# Patient Record
Sex: FEMALE | Race: WHITE | NOT HISPANIC OR LATINO | Employment: OTHER | URBAN - METROPOLITAN AREA
[De-identification: names, ages, dates, MRNs, and addresses within clinical notes are randomized per-mention and may not be internally consistent; named-entity substitution may affect disease eponyms.]

---

## 2017-06-01 ENCOUNTER — ONCOLOGY VISIT (OUTPATIENT)
Dept: ONCOLOGY | Facility: CLINIC | Age: 32
End: 2017-06-01
Attending: CLINICAL NURSE SPECIALIST
Payer: COMMERCIAL

## 2017-06-01 VITALS
RESPIRATION RATE: 16 BRPM | HEART RATE: 80 BPM | DIASTOLIC BLOOD PRESSURE: 63 MMHG | BODY MASS INDEX: 27.76 KG/M2 | WEIGHT: 172 LBS | OXYGEN SATURATION: 98 % | SYSTOLIC BLOOD PRESSURE: 95 MMHG | TEMPERATURE: 98.4 F

## 2017-06-01 DIAGNOSIS — Z15.01 BRCA2 POSITIVE: Primary | ICD-10-CM

## 2017-06-01 DIAGNOSIS — Z91.89 AT RISK FOR CANCER: ICD-10-CM

## 2017-06-01 DIAGNOSIS — Z15.09 BRCA2 POSITIVE: Primary | ICD-10-CM

## 2017-06-01 LAB — CANCER AG125 SERPL-ACNC: 23 U/ML (ref 0–30)

## 2017-06-01 PROCEDURE — 86304 IMMUNOASSAY TUMOR CA 125: CPT | Performed by: CLINICAL NURSE SPECIALIST

## 2017-06-01 PROCEDURE — 36415 COLL VENOUS BLD VENIPUNCTURE: CPT

## 2017-06-01 PROCEDURE — 99211 OFF/OP EST MAY X REQ PHY/QHP: CPT

## 2017-06-01 PROCEDURE — 99214 OFFICE O/P EST MOD 30 MIN: CPT | Performed by: CLINICAL NURSE SPECIALIST

## 2017-06-01 ASSESSMENT — PAIN SCALES - GENERAL: PAINLEVEL: NO PAIN (0)

## 2017-06-01 NOTE — PATIENT INSTRUCTIONS
Individualized Surveillance Plan for women  Hereditary Breast and/or Ovarian Cancer Syndrome   Per NCCN Guidelines Version 2.2017   Recommended screening Test or procedure Last done Next Scheduled    Breast self awareness- starting at age 18. Women should be familiar with their breasts and promptly report changes to their care provider. Periodic, consistent self exam may facilitate breast self awareness.    6/1/2017   ongoing   Breast screening, starting at age 25 Clinical breast exams every 6 -12 months     Breast screening   Age 25-29 Annual breast MRI screening with contrast (preferred) or mammogram if MRI is unavailable or individualized based on family history if a breast cancer diagnosis before age 30 is present.       Breast MRI is performed preferably on day 7-15 of menstrual cycle for premenopausal women.   NA   NA   Breast screening   Age >30-75 years     Annual mammogram and   annual breast MRI, preferably on day 7-15 of menstrual cycle for premenopausal women.    Age>75 years, management should be considered on an individual basis. NA - Prophylactic mastectomy 2016 NA   Ovarian cancer screening, starting at age 30-35 Consider transvaginal ultrasound and  tests. 11/16/2016 - Pelvic US, normal    6/21/2016 - : 23, normal NEXT: ordered, then next in Dec. 2017   Recommendation: risk-reducing salpingo-oophorectomy, typically between 35 and 40 years old and  upon completion of child bearing.     Because ovarian cancer onset in patients with BRCA2 mutations is on average of 8-10 years later than in patients with BRCA1 mutations, it is reasonable to delay risk-reducing salpingo-oophorectomy until 40-45 years in patients with BRCA2 mutations who have already maximized their breast cancer prevention (i.e., undergone bilateral mastectomy).    Salpingectomy alone is not the standard of care for risk reduction although clinical trials are ongoing.     Discuss option of risk-reducing mastectomy.    Consider  risks and benefits of risk reducing agents, such as tamoxifen and raloxifene for breast and ovarian cancer.    Consider a full body skin and eye exam for melanoma screening for both BRCA1+ and BRCA2+.     NOTE: Women with BRCA mutation who are treated for breast cancer should have screening of the remaining breast tissue with annual mammography and breast MRI.    The International Cancer of the Pancreas Screening (CAPS) Consortium recommends that individuals with a BRCA2 mutation who have at least one first-degree relative with pancreatic cancer should undergo initial screening with endoscopic ultrasonography and/or MRI/magnetic resonance cholangiopancreatography.     Please get her an appointment with [facility], [address] [place] [address] Ave.     For Radiology Scheduling call [phone]    Patient wants to schedule her own Ultrasound after she checks her schedule/Ericka  AVS printed & given to patient/Erikca

## 2017-06-01 NOTE — PROGRESS NOTES
"Oncology Risk Management Consultation:  Date on this visit: 2017    Tessa Ospina returns to the clinic today for a follow up oncology risk management consultation. She requires screening and surveillance to minimize her risk of cancer secondary to having a deleterious BRCA2 mutation.  She is considered to be at high risk for hereditary  ovarian cancer.She had a prophylactic mastectomy last year. She is here with her son, Chet, and her daughter, Nely.    Primary Physician: No Ref-Primary, Physician     History Of Present Illness:  Ms. Ospina is a very pleasant, healthy  31 year old female who presents with family history of breast cancer.    Genetic testin2015 - POSITIVE for a and a BRCA2 mutation specifically [id number], the same genetic mutation in her mother. Testing done using single site analysis through [facility].    Pertinent  history:  Menarche at age 13.  First child at age 26.    History of breastfeeding both children. No issues with mastitis.   Ovaries and uterus intact.  No history of breast hyperplasia, dysplasia or malignancy.   2016 - Prophylactic nipple sparing mastectomy with reconstruction (Dr. Nely Mcqueen and Dr. Lam Arora), pathology benign.       Screening history:  Pelvic screenin/3/15 - probable uterine fibroid in anterior myometrium and L ovarian collapsing follicle.   16:L complex ovarian lesion.  3/4/16: small L ovarian cyst.   2016 - pelvic US, normal. No lesions seen.     At this visit, she states she is having some twinges and \"itchyness\" in her breasts, particularly in the upper chest area and notes that she feels some pain and discomfort when lifting.  She is being followed by Dr. Arora and is planning to have an additional fill made to her breasts soon. She states that she is managing the discomfort and recognizes that this is part of her post surgical course.  She denies lumps in her axillae.  She denies abdominal pain, bloating, tenderness, " diarrhea, constipation, urinary frequency, urgency and fatigue.    Past Medical/Surgical History:  Past Medical History:   Diagnosis Date     BRCA2 positive      PONV (postoperative nausea and vomiting)     Unsure if this was due to anesthesia and/or vicodin.     Past Surgical History:   Procedure Laterality Date     BREAST BIOPSY, RT/LT Right 2015    benign      SECTION  ,          MASTECTOMY, NIPPLE SPARING Bilateral 6/15/2016    Procedure: MASTECTOMY, NIPPLE SPARING;  Surgeon: Nely Mcqueen MD;  Location: [facility] OR     RECONSTRUCT BREAST BILATERAL Bilateral 6/15/2016    Procedure: RECONSTRUCT BREAST BILATERAL;  Surgeon: SUZY Arora MD;  Location: [facility] OR     RECONSTRUCT BREAST SECOND STAGE BILATERAL N/A 9/15/2016    Procedure: RECONSTRUCT BREAST SECOND STAGE BILATERAL;  Surgeon: SUZY Arora MD;  Location: [facility] OR     TONSILLECTOMY & ADENOIDECTOMY       WISDOM ST GUIDEWIRE         Allergies:  Allergies as of 2017 - Ambrosio as Reviewed 2017   Allergen Reaction Noted     Percocet [oxycodone-acetaminophen] Nausea and Vomiting 2015     Vicodin [hydrocodone-acetaminophen] Nausea and Vomiting 2010       Current Medications:  No current outpatient prescriptions on file.        Family History:  Family History   Problem Relation Age of Onset     Breast Cancer Mother 49     BRCA2 genetic mutation     Other Cancer Maternal Grandfather 60     prostate (later developed throat cancer)     Genetic Disorder Maternal Aunt      BRCA2 genetic mutation     CANCER Maternal Uncle 70     3 uncles (all 70s and 80s)     Breast Cancer Other 30     maternal cousin; BRCA2 genetic mutation     Other Cancer Other 60     maternal cousin; prostate cancer     Breast Cancer Maternal Aunt 70     Breast Cancer Other 40     maternal cousin     Cancer - colorectal Other 70     Maternal great grandfather     Cancer - colorectal Paternal Uncle 40      in 40s       Social  History:  Social History     Social History     Marital status:      Spouse name: Danny     Number of children: 2     Years of education: N/A     Occupational History      Self     Social History Main Topics     Smoking status: Never Smoker     Smokeless tobacco: Never Used     Alcohol use No     Drug use: No     Sexual activity: Yes     Partners: Male      Comment: no birth control at this time     Other Topics Concern     Caffeine Concern No     Occupational Exposure No     Hobby Hazards No     Sleep Concern No     Stress Concern Yes     Weight Concern Yes     in the process of getting back to prepregnancy weight     Special Diet No     Exercise No     keeping busy with 2 children under the age of 4; exercising using high intensity interval classes     Self-Exams Yes     Social History Narrative       Physical Exam:  BP 95/63 (BP Location: Left arm, Patient Position: Chair, Cuff Size: Adult Regular)  Pulse 80  Temp 98.4  F (36.9  C) (Oral)  Resp 16  Wt 78 kg (172 lb)  SpO2 98%  BMI 27.76 kg/m2    GENERAL APPEARANCE: healthy, alert and no distress     BREAST: well healed, flat light pink vertical scars on lateral breasts bilaterally       SKIN: no suspicious lesions or rashes on upper extremities, head and face    Laboratory/Imaging Studies  Results for orders placed or performed during the hospital encounter of 11/16/16   US Pelvic Complete with Transvaginal    Narrative    ULTRASOUND PELVIS WITH TRANSVAGINAL IMAGING  11/16/2016 3:31 PM     HISTORY: Check resolution of complex ovarian cyst; BRCA2+. Malignant  neoplasm of unspecified site of unspecified female breast. Genetic  susceptibility to malignant neoplasm of breast. Malignant neoplasm of  unspecified ovary.    COMPARISON: 3/4/2016    FINDINGS:  Transvaginal images were performed to better evaluate the  patient's uterus, ovaries and endometrial stripe.    No fibroids are evident. The uterus is normal. Endometrial stripe  measures 14 mm and  is normal for patient's age and menstrual status.  The right ovary is normal. The left ovary is normal.  No adnexal  masses are present. No free pelvic fluid is present.      Impression    IMPRESSION: Negative pelvic ultrasound, no complex cyst seen in the  left ovary.    JUJU MENDOZA MD       ASSESSMENT  Tessa is doing well after her mastectomy and feels that she is getting back to a normal routine for her. Her risk for breast cancer has been reduced at least 90% by having the prophylactic mastectomy.  Because she has a BRCA2 mutation she has an approximate 11-18% lifetime risk of ovarian cancer, including primary peritoneal and fallopian tube cancer, compared to the 1-2% lifetime risk within the general population. The mean age diagnosis of ovarian cancer for BRCA1/2 carriers is estimated to be 50.8 years old.  There is also an increased risk of pancreatic cancer and melanoma. I am referring [facility] to [facility], [address] [place] [address] for a head-to-toe exam, as she is at heightened risk for melanoma.    Male BRCA2 mutation carriers have a cumulative breast cancer risk of over 6% by age 70 and prostate cancer risk of approximately 15% by age 65.    We reviewed what she can do to reduce her risk of ovarian cancer and she is interested in a  prophylactic bilateral salpingo-ooperectomy in the future Research involving 10 studies of BRCA1 and BRCA2 mutation carriers showed an 80% reduction the risk for fallopian tube and ovarian cancer following risk reducing salpingo-oophorectomy, with a residual risk of peritoneal carcinoma of 1-2.7%. According to NCCN, she could consider having a salpingo oophorectomy around age 40-45, which may be reasonable as there is no known ovarian cancer in her family.  Of note, however, both her mother and maternal aunt (both BRCA2+)  Had prophylactic salpingo oophorectomies in their 50s.     We also discussed that she could consider taking oral contraceptives (combination  pills) which could stop ovulation. Oral contraceptives use has been associated with a reduction in ovarian cancer risk. Use of oral contraceptives are  associated with a significant reduced risk of ovarian cancer for BRCA1/2 carriers (summary relative risk (SRR)=0.50; 95% confidence interval (CI), 0.33-0.75). Research shows a significant 36% risk reduction for each additional 10 years of OC use (SRR: 0.64; 95% CI, 0.53-0.78; P trend<0.01).  (Source: Mikaela S, Ernesto M, Santiago N, Darron I, Margot B, Jeannette P, Jacob L, Maisonneuve P, Ganoxanai S Eur J Cancer. 2010;46(12):7880.)  We discussed establishing care with an OB-Gyn for PAPs, pelvic exams and a discussion of oral contraceptives with the intent of stopping ovulation.  Tessa is going to check her insurance and follow up on finding a provider for these services.    INDIVIDUALIZED CANCER RISK MANAGEMENT PLAN:  Individualized Surveillance Plan for women  Hereditary Breast and/or Ovarian Cancer Syndrome   Per NCCN Guidelines Version 2.2017   Recommended screening Test or procedure Last done Next Scheduled    Breast self awareness- starting at age 18. Women should be familiar with their breasts and promptly report changes to their care provider. Periodic, consistent self exam may facilitate breast self awareness.    6/1/2017   ongoing   Breast screening, starting at age 25 Clinical breast exams every 6 -12 months     Breast screening   Age 25-29 Annual breast MRI screening with contrast (preferred) or mammogram if MRI is unavailable or individualized based on family history if a breast cancer diagnosis before age 30 is present.       Breast MRI is performed preferably on day 7-15 of menstrual cycle for premenopausal women.   NA   NA   Breast screening   Age >30-75 years     Annual mammogram and   annual breast MRI, preferably on day 7-15 of menstrual cycle for premenopausal women.    Age>75 years, management should be considered on an individual basis. NA -  Prophylactic mastectomy 2016 NA   Ovarian cancer screening, starting at age 30-35 Consider transvaginal ultrasound and  tests. 11/16/2016 - Pelvic US, normal    6/21/2016 - : 23, normal NEXT: ordered, then next in Dec. 2017   Recommendation: risk-reducing salpingo-oophorectomy, typically between 35 and 40 years old and  upon completion of child bearing.     Because ovarian cancer onset in patients with BRCA2 mutations is on average of 8-10 years later than in patients with BRCA1 mutations, it is reasonable to delay risk-reducing salpingo-oophorectomy until 40-45 years in patients with BRCA2 mutations who have already maximized their breast cancer prevention (i.e., undergone bilateral mastectomy).    Salpingectomy alone is not the standard of care for risk reduction although clinical trials are ongoing.     Discuss option of risk-reducing mastectomy.    Consider risks and benefits of risk reducing agents, such as tamoxifen and raloxifene for breast and ovarian cancer.    Consider a full body skin and eye exam for melanoma screening for both BRCA1+ and BRCA2+.     NOTE: Women with BRCA mutation who are treated for breast cancer should have screening of the remaining breast tissue with annual mammography and breast MRI.    The International Cancer of the Pancreas Screening (CAPS) Consortium recommends that individuals with a BRCA2 mutation who have at least one first-degree relative with pancreatic cancer should undergo initial screening with endoscopic ultrasonography and/or MRI/magnetic resonance cholangiopancreatography.           I will follow up on her screenings and see her in the Cancer Risk Management clinic in one year.    I spent 30 minutes with the patient with greater that 50% of it in counseling and coordinating care as documented above.    Cate Castro, MESHA-CNS, OCN, ANG-BC  Clinical Nurse Specialist  Cancer Risk Management Program  [address]  [address]  [place] [address]    phone:  [phone]  Pager: [phone]  fax: [phone]

## 2017-06-01 NOTE — MR AVS SNAPSHOT
After Visit Summary   6/1/2017    Tessa Ospina    MRN: [id number]           Patient Information     Date Of Birth          1985        Visit Information        Provider Department      6/1/2017 9:30 AM Cate Castro APRN [facility]        Today's Diagnoses     BRCA2 positive    -  1    At risk for cancer          Care Instructions    Individualized Surveillance Plan for women  Hereditary Breast and/or Ovarian Cancer Syndrome   Per NCCN Guidelines Version 2.2017   Recommended screening Test or procedure Last done Next Scheduled    Breast self awareness- starting at age 18. Women should be familiar with their breasts and promptly report changes to their care provider. Periodic, consistent self exam may facilitate breast self awareness.    6/1/2017   ongoing   Breast screening, starting at age 25 Clinical breast exams every 6 -12 months     Breast screening   Age 25-29 Annual breast MRI screening with contrast (preferred) or mammogram if MRI is unavailable or individualized based on family history if a breast cancer diagnosis before age 30 is present.       Breast MRI is performed preferably on day 7-15 of menstrual cycle for premenopausal women.   NA   NA   Breast screening   Age >30-75 years     Annual mammogram and   annual breast MRI, preferably on day 7-15 of menstrual cycle for premenopausal women.    Age>75 years, management should be considered on an individual basis. NA - Prophylactic mastectomy 2016 NA   Ovarian cancer screening, starting at age 30-35 Consider transvaginal ultrasound and  tests. 11/16/2016 - Pelvic US, normal    6/21/2016 - : 23, normal NEXT: ordered, then next in Dec. 2017   Recommendation: risk-reducing salpingo-oophorectomy, typically between 35 and 40 years old and  upon completion of child bearing.     Because ovarian cancer onset in patients with BRCA2 mutations is on average of 8-10 years later than in patients with  BRCA1 mutations, it is reasonable to delay risk-reducing salpingo-oophorectomy until 40-45 years in patients with BRCA2 mutations who have already maximized their breast cancer prevention (i.e., undergone bilateral mastectomy).    Salpingectomy alone is not the standard of care for risk reduction although clinical trials are ongoing.     Discuss option of risk-reducing mastectomy.    Consider risks and benefits of risk reducing agents, such as tamoxifen and raloxifene for breast and ovarian cancer.    Consider a full body skin and eye exam for melanoma screening for both BRCA1+ and BRCA2+.     NOTE: Women with BRCA mutation who are treated for breast cancer should have screening of the remaining breast tissue with annual mammography and breast MRI.    The International Cancer of the Pancreas Screening (CAPS) Consortium recommends that individuals with a BRCA2 mutation who have at least one first-degree relative with pancreatic cancer should undergo initial screening with endoscopic ultrasonography and/or MRI/magnetic resonance cholangiopancreatography.     Please get her an appointment with [facility], [address] [place] [address]     For Radiology Scheduling call [phone]            Follow-ups after your visit        Additional Services     DERMATOLOGY REFERRAL       Your provider has referred you to: [facility]: [facility] [phone]   [email or web address]    Please be aware that coverage of these services is subject to the terms and limitations of your health insurance plan.  Call member services at your health plan with any benefit or coverage questions.      Please bring the following to your appointment:  Any x-rays, CTs or MRIs which have been performed.  Contact the facility where they were done to arrange for  prior to your scheduled appointment.  Any new CT, MRI or other procedures ordered by your specialist must be performed at a [facility] or  coordinated by your clinic's referral office.    List of current medications   This referral request   Any documents/labs given to you for this referral                  Follow-up notes from your care team     Return in about 6 months (around 12/1/2017) for Physical Exam.      Future tests that were ordered for you today     Open Standing Orders        Priority Remaining Interval Expires Ordered     Routine 2/2 6 12/1/2017 6/1/2017          Open Future Orders        Priority Expected Expires Ordered    US Pelvic Complete with Transvaginal Routine 12/1/2017 6/1/2018 6/1/2017            Who to contact     If you have questions or need follow up information about today's clinic visit or your schedule please contact [facility]ISK MANAGEMENT PROGRAM directly at [phone].  Normal or non-critical lab and imaging results will be communicated to you by [employer], letter or phone within 4 business days after the clinic has received the results. If you do not hear from us within 7 days, please contact the clinic through [employer] or phone. If you have a critical or abnormal lab result, we will notify you by phone as soon as possible.  Submit refill requests through [employer] or call your pharmacy and they will forward the refill request to us. Please allow 3 business days for your refill to be completed.          Additional Information About Your Visit        [employer] Information     [employer] gives you secure access to your electronic health record. If you see a primary care provider, you can also send messages to your care team and make appointments. If you have questions, please call your primary care clinic.  If you do not have a primary care provider, please call [phone] and they will assist you.        Care EveryWhere ID     This is your Care EveryWhere ID. This could be used by other organizations to access your [facility] medical records  [id number]        Your Vitals Were     Pulse Temperature Respirations Pulse  Oximetry BMI (Body Mass Index)       80 98.4  F (36.9  C) (Oral) 16 98% 27.76 kg/m2        Blood Pressure from Last 3 Encounters:   06/01/17 95/63   09/15/16 94/64   09/12/16 92/68    Weight from Last 3 Encounters:   06/01/17 78 kg (172 lb)   09/15/16 77.3 kg (170 lb 6.7 oz)   09/12/16 77.9 kg (171 lb 12.8 oz)              We Performed the Following          DERMATOLOGY REFERRAL        Primary Care Provider    Physician No Ref-Primary       No address on file        Thank you!     Thank you for choosing [facility]  for your care. Our goal is always to provide you with excellent care. Hearing back from our patients is one way we can continue to improve our services. Please take a few minutes to complete the written survey that you may receive in the mail after your visit with us. Thank you!             Your Updated Medication List - Protect others around you: Learn how to safely use, store and throw away your medicines at www.disposemymeds.org.      Notice  As of 6/1/2017 10:36 AM    You have not been prescribed any medications.

## 2017-06-01 NOTE — PROGRESS NOTES
"Oncology Rooming Note    June 1, 2017 9:41 AM   Tessa Ospina is a 31 year old female who presents for:    Chief Complaint   Patient presents with     Oncology Clinic Visit     Bilteral Mastectomy      Initial Vitals: BP 95/63 (BP Location: Left arm, Patient Position: Chair, Cuff Size: Adult Regular)  Pulse 80  Temp 98.4  F (36.9  C) (Oral)  Resp 16  Wt 78 kg (172 lb)  SpO2 98%  BMI 27.76 kg/m2 Estimated body mass index is 27.76 kg/(m^2) as calculated from the following:    Height as of 9/15/16: 1.676 m (5' 6\").    Weight as of this encounter: 78 kg (172 lb). Body surface area is 1.91 meters squared.  No Pain (0) Comment: Data Unavailable   No LMP recorded.  Allergies reviewed: Yes  Medications reviewed: Yes    Medications: Medication refills not needed today.  Pharmacy name entered into Network Chemistry:    [facility] [address] [facility] - [place], [place] - [address]  [facility] [id number] [place] [address]    Clinical concerns: Follow-Up Dr. Castro was notified.    5 minutes for nursing intake (face to face time)     Shani Oneill MA    Medical Assistant Note:  Tessa Ospina presents today for lab visit.    Patient seen by provider today: Yes: Cate Castro.   present during visit today: Not Applicable.    Concerns: No Concerns.    Procedure:  Lab draw site: LAC, Needle type: BF, Gauge: 21 g gauze and coban applied.    Post Assessment:  Labs drawn without difficulty: Yes.    Discharge Plan:  Departure Mode: Ambulatory.    Face to Face Time: 5.    Christina Retana MA            "

## 2017-06-01 NOTE — LETTER
"    Cancer Risk Management  Program Locations    [facility]  [facility]  [facility]  Mailing Address  Cancer Risk Management Program  [facility]  [address]  [place] [address]    New patient appointments  [phone]  June 1, 2017    Tessa Ospina  [address]    [facility] [address]      Dear Tessa,    It was a pleasure seeing you and the kids today at the clinic. I will let you know what your results show. Please feel free to contact me if you have any questions or concerns. Below is your \"homework\" for follow up! I'll see you next June.      Oncology Rooming Note    June 1, 2017 9:41 AM   Tessa Ospina is a 31 year old female who presents for:    Chief Complaint   Patient presents with     Oncology Clinic Visit     Bilteral Mastectomy      Initial Vitals: BP 95/63 (BP Location: Left arm, Patient Position: Chair, Cuff Size: Adult Regular)  Pulse 80  Temp 98.4  F (36.9  C) (Oral)  Resp 16  Wt 78 kg (172 lb)  SpO2 98%  BMI 27.76 kg/m2 Estimated body mass index is 27.76 kg/(m^2) as calculated from the following:    Height as of 9/15/16: 1.676 m (5' 6\").    Weight as of this encounter: 78 kg (172 lb). Body surface area is 1.91 meters squared.  No Pain (0) Comment: Data Unavailable   No LMP recorded.  Allergies reviewed: Yes  Medications reviewed: Yes    Medications: Medication refills not needed today.  Pharmacy name entered into [facility]:    [facility] [place], [place] - [address] [place] [address] [facility] [place], [place] - [address]  [facility] [id number] [place] [address]    Clinical concerns: Follow-Up Dr. Castro was notified.    5 minutes for nursing intake (face to face time)     Shani Oneill MA    Medical Assistant Note:  Tessa Ospina presents today " "for lab visit.    Patient seen by provider today: Yes: Cate Castro.   present during visit today: Not Applicable.    Concerns: No Concerns.    Procedure:  Lab draw site: LAC, Needle type: BF, Gauge: 21 g gauze and coban applied.    Post Assessment:  Labs drawn without difficulty: Yes.    Discharge Plan:  Departure Mode: Ambulatory.    Face to Face Time: 5.    Christina Retana MA              Oncology Risk Management Consultation:  Date on this visit: 2017    Tessa Ospina returns to the clinic today for a follow up oncology risk management consultation. She requires screening and surveillance to minimize her risk of cancer secondary to having a deleterious BRCA2 mutation.  She is considered to be at high risk for hereditary  ovarian cancer.She had a prophylactic mastectomy last year. She is here with her son, Chet, and her daughter, Nely.    Primary Physician: No Ref-Primary, Physician     History Of Present Illness:  Ms. Ospina is a very pleasant, healthy  31 year old female who presents with family history of breast cancer.    Genetic testin2015 - POSITIVE for a and a BRCA2 mutation specifically [id number], the same genetic mutation in her mother. Testing done using single site analysis through [facility].    Pertinent  history:  Menarche at age 13.  First child at age 26.    History of breastfeeding both children. No issues with mastitis.   Ovaries and uterus intact.  No history of breast hyperplasia, dysplasia or malignancy.   2016 - Prophylactic nipple sparing mastectomy with reconstruction (Dr. Nely Mcqueen and Dr. Lam Arora), pathology benign.       Screening history:  Pelvic screenin/3/15 - probable uterine fibroid in anterior myometrium and L ovarian collapsing follicle.   16:L complex ovarian lesion.  3/4/16: small L ovarian cyst.   2016 - pelvic US, normal. No lesions seen.     At this visit, she states she is having some twinges and \"itchyness\" in her " breasts, particularly in the upper chest area and notes that she feels some pain and discomfort when lifting.  She is being followed by Dr. Arora and is planning to have an additional fill made to her breasts soon. She states that she is managing the discomfort and recognizes that this is part of her post surgical course.  She denies lumps in her axillae.  She denies abdominal pain, bloating, tenderness, diarrhea, constipation, urinary frequency, urgency and fatigue.    Past Medical/Surgical History:  Past Medical History:   Diagnosis Date     BRCA2 positive      PONV (postoperative nausea and vomiting)     Unsure if this was due to anesthesia and/or vicodin.     Past Surgical History:   Procedure Laterality Date     BREAST BIOPSY, RT/LT Right 2015    benign      SECTION  ,          MASTECTOMY, NIPPLE SPARING Bilateral 6/15/2016    Procedure: MASTECTOMY, NIPPLE SPARING;  Surgeon: Nely Mcqueen MD;  Location: [facility]     RECONSTRUCT BREAST BILATERAL Bilateral 6/15/2016    Procedure: RECONSTRUCT BREAST BILATERAL;  Surgeon: SUZY Arora MD;  Location: [facility]     RECONSTRUCT BREAST SECOND STAGE BILATERAL N/A 9/15/2016    Procedure: RECONSTRUCT BREAST SECOND STAGE BILATERAL;  Surgeon: SUZY Arora MD;  Location: [facility] OR     TONSILLECTOMY & ADENOIDECTOMY       [facility]         Allergies:  Allergies as of 2017 - Ambrosio as Reviewed 2017   Allergen Reaction Noted     Percocet [oxycodone-acetaminophen] Nausea and Vomiting 2015     Vicodin [hydrocodone-acetaminophen] Nausea and Vomiting 2010       Current Medications:  No current outpatient prescriptions on file.        Family History:  Family History   Problem Relation Age of Onset     Breast Cancer Mother 49     BRCA2 genetic mutation     Other Cancer Maternal Grandfather 60     prostate (later developed throat cancer)     Genetic Disorder Maternal Aunt      BRCA2 genetic mutation      CANCER Maternal Uncle 70     3 uncles (all 70s and 80s)     Breast Cancer Other 30     maternal cousin; BRCA2 genetic mutation     Other Cancer Other 60     maternal cousin; prostate cancer     Breast Cancer Maternal Aunt 70     Breast Cancer Other 40     maternal cousin     Cancer - colorectal Other 70     Maternal great grandfather     Cancer - colorectal Paternal Uncle 40      in 40s       Social History:  Social History     Social History     Marital status:      Spouse name: Danny     Number of children: 2     Years of education: N/A     Occupational History      Self     Social History Main Topics     Smoking status: Never Smoker     Smokeless tobacco: Never Used     Alcohol use No     Drug use: No     Sexual activity: Yes     Partners: Male      Comment: no birth control at this time     Other Topics Concern     Caffeine Concern No     Occupational Exposure No     Hobby Hazards No     Sleep Concern No     Stress Concern Yes     Weight Concern Yes     in the process of getting back to prepregnancy weight     Special Diet No     Exercise No     keeping busy with 2 children under the age of 4; exercising using high intensity interval classes     Self-Exams Yes     Social History Narrative       Physical Exam:  BP 95/63 (BP Location: Left arm, Patient Position: Chair, Cuff Size: Adult Regular)  Pulse 80  Temp 98.4  F (36.9  C) (Oral)  Resp 16  Wt 78 kg (172 lb)  SpO2 98%  BMI 27.76 kg/m2    GENERAL APPEARANCE: healthy, alert and no distress     BREAST: well healed, flat light pink vertical scars on lateral breasts bilaterally       SKIN: no suspicious lesions or rashes on upper extremities, head and face    Laboratory/Imaging Studies  Results for orders placed or performed during the hospital encounter of 16   US Pelvic Complete with Transvaginal    Narrative    ULTRASOUND PELVIS WITH TRANSVAGINAL IMAGING  2016 3:31 PM     HISTORY: Check resolution of complex ovarian cyst;  BRCA2+. Malignant  neoplasm of unspecified site of unspecified female breast. Genetic  susceptibility to malignant neoplasm of breast. Malignant neoplasm of  unspecified ovary.    COMPARISON: 3/4/2016    FINDINGS:  Transvaginal images were performed to better evaluate the  patient's uterus, ovaries and endometrial stripe.    No fibroids are evident. The uterus is normal. Endometrial stripe  measures 14 mm and is normal for patient's age and menstrual status.  The right ovary is normal. The left ovary is normal.  No adnexal  masses are present. No free pelvic fluid is present.      Impression    IMPRESSION: Negative pelvic ultrasound, no complex cyst seen in the  left ovary.    JUJU EMNDOZA MD       ASSESSMENT  Tessa is doing well after her mastectomy and feels that she is getting back to a normal routine for her. Her risk for breast cancer has been reduced at least 90% by having the prophylactic mastectomy.  Because she has a BRCA2 mutation she has an approximate 11-18% lifetime risk of ovarian cancer, including primary peritoneal and fallopian tube cancer, compared to the 1-2% lifetime risk within the general population. The mean age diagnosis of ovarian cancer for BRCA1/2 carriers is estimated to be 50.8 years old.  There is also an increased risk of pancreatic cancer and melanoma. I am referring her to [facility], [address] [place] [address].  [facility] for a head-to-toe exam, as she is at heightened risk for melanoma.    Male BRCA2 mutation carriers have a cumulative breast cancer risk of over 6% by age 70 and prostate cancer risk of approximately 15% by age 65.    We reviewed what she can do to reduce her risk of ovarian cancer and she is interested in a  prophylactic bilateral salpingo-ooperectomy in the future Research involving 10 studies of BRCA1 and BRCA2 mutation carriers showed an 80% reduction the risk for fallopian tube and ovarian cancer following risk reducing salpingo-oophorectomy, with a residual  risk of peritoneal carcinoma of 1-2.7%. According to NCCN, she could consider having a salpingo oophorectomy around age 40-45, which may be reasonable as there is no known ovarian cancer in her family.  Of note, however, both her mother and maternal aunt (both BRCA2+)  Had prophylactic salpingo oophorectomies in their 50s.     We also discussed that she could consider taking oral contraceptives (combination pills) which could stop ovulation. Oral contraceptives use has been associated with a reduction in ovarian cancer risk. Use of oral contraceptives are  associated with a significant reduced risk of ovarian cancer for BRCA1/2 carriers (summary relative risk (SRR)=0.50; 95% confidence interval (CI), 0.33-0.75). Research shows a significant 36% risk reduction for each additional 10 years of OC use (SRR: 0.64; 95% CI, 0.53-0.78; P trend<0.01).  (Source: Mikaela S, Ernesto M, Santiago N, Darron I, Margot B, Jeannette P, Jacob L, Maisonneuve P, Won S Eur J Cancer. 2010;46(12):2275.)  We discussed establishing care with an OB-Gyn for PAPs, pelvic exams and a discussion of oral contraceptives with the intent of stopping ovulation.  Tessa is going to check her insurance and follow up on finding a provider for these services.    INDIVIDUALIZED CANCER RISK MANAGEMENT PLAN:  Individualized Surveillance Plan for women  Hereditary Breast and/or Ovarian Cancer Syndrome   Per NCCN Guidelines Version 2.2017   Recommended screening Test or procedure Last done Next Scheduled    Breast self awareness- starting at age 18. Women should be familiar with their breasts and promptly report changes to their care provider. Periodic, consistent self exam may facilitate breast self awareness.    6/1/2017   ongoing   Breast screening, starting at age 25 Clinical breast exams every 6 -12 months     Breast screening   Age 25-29 Annual breast MRI screening with contrast (preferred) or mammogram if MRI is unavailable or individualized based on  family history if a breast cancer diagnosis before age 30 is present.       Breast MRI is performed preferably on day 7-15 of menstrual cycle for premenopausal women.   NA   NA   Breast screening   Age >30-75 years     Annual mammogram and   annual breast MRI, preferably on day 7-15 of menstrual cycle for premenopausal women.    Age>75 years, management should be considered on an individual basis. NA - Prophylactic mastectomy 2016 NA   Ovarian cancer screening, starting at age 30-35 Consider transvaginal ultrasound and  tests. 11/16/2016 - Pelvic US, normal    6/21/2016 - : 23, normal NEXT: ordered, then next in Dec. 2017   Recommendation: risk-reducing salpingo-oophorectomy, typically between 35 and 40 years old and  upon completion of child bearing.     Because ovarian cancer onset in patients with BRCA2 mutations is on average of 8-10 years later than in patients with BRCA1 mutations, it is reasonable to delay risk-reducing salpingo-oophorectomy until 40-45 years in patients with BRCA2 mutations who have already maximized their breast cancer prevention (i.e., undergone bilateral mastectomy).    Salpingectomy alone is not the standard of care for risk reduction although clinical trials are ongoing.     Discuss option of risk-reducing mastectomy.    Consider risks and benefits of risk reducing agents, such as tamoxifen and raloxifene for breast and ovarian cancer.    Consider a full body skin and eye exam for melanoma screening for both BRCA1+ and BRCA2+.     NOTE: Women with BRCA mutation who are treated for breast cancer should have screening of the remaining breast tissue with annual mammography and breast MRI.    The International Cancer of the Pancreas Screening (CAPS) Consortium recommends that individuals with a BRCA2 mutation who have at least one first-degree relative with pancreatic cancer should undergo initial screening with endoscopic ultrasonography and/or MRI/magnetic resonance  cholangiopancreatography.           I will follow up on her screenings and see her in the Cancer Risk Management clinic in one year.    I spent 30 minutes with the patient with greater that 50% of it in counseling and coordinating care as documented above.    Cate Castro, MESHA-CNS, OCN, ANG-BC  Clinical Nurse Specialist  Cancer Risk Management Program  [address]  [place] [address]    phone:  [phone]  Pager: [phone]  fax: [phone]

## 2017-06-07 ENCOUNTER — HOSPITAL ENCOUNTER (OUTPATIENT)
Dept: ULTRASOUND IMAGING | Facility: CLINIC | Age: 32
Discharge: HOME | End: 2017-06-07
Attending: CLINICAL NURSE SPECIALIST | Admitting: CLINICAL NURSE SPECIALIST
Payer: COMMERCIAL

## 2017-06-07 DIAGNOSIS — Z15.09 BRCA2 POSITIVE: ICD-10-CM

## 2017-06-07 DIAGNOSIS — Z91.89 AT RISK FOR CANCER: ICD-10-CM

## 2017-06-07 DIAGNOSIS — Z15.01 BRCA2 POSITIVE: ICD-10-CM

## 2017-06-07 PROCEDURE — 76830 TRANSVAGINAL US NON-OB: CPT

## 2017-08-29 ENCOUNTER — OFFICE VISIT (OUTPATIENT)
Dept: FAMILY MEDICINE | Facility: CLINIC | Age: 32
End: 2017-08-29
Payer: COMMERCIAL

## 2017-08-29 VITALS
HEART RATE: 89 BPM | BODY MASS INDEX: 27.8 KG/M2 | WEIGHT: 173 LBS | OXYGEN SATURATION: 98 % | SYSTOLIC BLOOD PRESSURE: 98 MMHG | DIASTOLIC BLOOD PRESSURE: 69 MMHG | TEMPERATURE: 98.2 F | HEIGHT: 66 IN

## 2017-08-29 DIAGNOSIS — R11.0 NAUSEA: ICD-10-CM

## 2017-08-29 DIAGNOSIS — Z86.32 HISTORY OF GESTATIONAL DIABETES: ICD-10-CM

## 2017-08-29 DIAGNOSIS — R73.03 PRE-DIABETES: Primary | ICD-10-CM

## 2017-08-29 DIAGNOSIS — Z83.79 FAMILY HISTORY OF CELIAC DISEASE: ICD-10-CM

## 2017-08-29 LAB
ALBUMIN SERPL-MCNC: 4.2 G/DL (ref 3.4–5)
ALP SERPL-CCNC: 66 U/L (ref 40–150)
ALT SERPL W P-5'-P-CCNC: 20 U/L (ref 0–50)
ANION GAP SERPL CALCULATED.3IONS-SCNC: 10 MMOL/L (ref 3–14)
AST SERPL W P-5'-P-CCNC: 16 U/L (ref 0–45)
BASOPHILS # BLD AUTO: 0 10E9/L (ref 0–0.2)
BASOPHILS NFR BLD AUTO: 0.7 %
BILIRUB SERPL-MCNC: 0.5 MG/DL (ref 0.2–1.3)
BUN SERPL-MCNC: 13 MG/DL (ref 7–30)
CALCIUM SERPL-MCNC: 9.1 MG/DL (ref 8.5–10.1)
CHLORIDE SERPL-SCNC: 105 MMOL/L (ref 94–109)
CHOLEST SERPL-MCNC: 156 MG/DL
CO2 SERPL-SCNC: 23 MMOL/L (ref 20–32)
CREAT SERPL-MCNC: 0.76 MG/DL (ref 0.52–1.04)
DIFFERENTIAL METHOD BLD: NORMAL
EOSINOPHIL # BLD AUTO: 0.1 10E9/L (ref 0–0.7)
EOSINOPHIL NFR BLD AUTO: 1.1 %
ERYTHROCYTE [DISTWIDTH] IN BLOOD BY AUTOMATED COUNT: 12.9 % (ref 10–15)
GFR SERPL CREATININE-BSD FRML MDRD: 88 ML/MIN/1.7M2
GLUCOSE SERPL-MCNC: 98 MG/DL (ref 70–99)
HBA1C MFR BLD: 4.9 % (ref 4.3–6)
HCT VFR BLD AUTO: 42.6 % (ref 35–47)
HDLC SERPL-MCNC: 62 MG/DL
HGB BLD-MCNC: 14.5 G/DL (ref 11.7–15.7)
LDLC SERPL CALC-MCNC: 74 MG/DL
LYMPHOCYTES # BLD AUTO: 1.6 10E9/L (ref 0.8–5.3)
LYMPHOCYTES NFR BLD AUTO: 28.7 %
MCH RBC QN AUTO: 30.5 PG (ref 26.5–33)
MCHC RBC AUTO-ENTMCNC: 34 G/DL (ref 31.5–36.5)
MCV RBC AUTO: 90 FL (ref 78–100)
MONOCYTES # BLD AUTO: 0.3 10E9/L (ref 0–1.3)
MONOCYTES NFR BLD AUTO: 5.6 %
NEUTROPHILS # BLD AUTO: 3.5 10E9/L (ref 1.6–8.3)
NEUTROPHILS NFR BLD AUTO: 63.9 %
NONHDLC SERPL-MCNC: 94 MG/DL
PLATELET # BLD AUTO: 258 10E9/L (ref 150–450)
POTASSIUM SERPL-SCNC: 4 MMOL/L (ref 3.4–5.3)
PROT SERPL-MCNC: 7.6 G/DL (ref 6.8–8.8)
RBC # BLD AUTO: 4.75 10E12/L (ref 3.8–5.2)
SODIUM SERPL-SCNC: 138 MMOL/L (ref 133–144)
TRIGL SERPL-MCNC: 101 MG/DL
TSH SERPL DL<=0.005 MIU/L-ACNC: 2.49 MU/L (ref 0.4–4)
WBC # BLD AUTO: 5.5 10E9/L (ref 4–11)

## 2017-08-29 PROCEDURE — 83036 HEMOGLOBIN GLYCOSYLATED A1C: CPT | Performed by: INTERNAL MEDICINE

## 2017-08-29 PROCEDURE — 83516 IMMUNOASSAY NONANTIBODY: CPT | Mod: 59 | Performed by: INTERNAL MEDICINE

## 2017-08-29 PROCEDURE — 80050 GENERAL HEALTH PANEL: CPT | Performed by: INTERNAL MEDICINE

## 2017-08-29 PROCEDURE — 82784 ASSAY IGA/IGD/IGG/IGM EACH: CPT | Performed by: INTERNAL MEDICINE

## 2017-08-29 PROCEDURE — 36415 COLL VENOUS BLD VENIPUNCTURE: CPT | Performed by: INTERNAL MEDICINE

## 2017-08-29 PROCEDURE — 99214 OFFICE O/P EST MOD 30 MIN: CPT | Performed by: INTERNAL MEDICINE

## 2017-08-29 PROCEDURE — 83516 IMMUNOASSAY NONANTIBODY: CPT | Performed by: INTERNAL MEDICINE

## 2017-08-29 PROCEDURE — 80061 LIPID PANEL: CPT | Performed by: INTERNAL MEDICINE

## 2017-08-29 NOTE — PROGRESS NOTES
SUBJECTIVE:   Tessa Ospina is a 31 year old female who presents to clinic today for the following health issues:      Concern - nausea   Onset: one week     Description:   Nausea, tinglings on lips    Intensity: mild    Progression of Symptoms:  same    Accompanying Signs & Symptoms:  Dizziness,fatigue     Previous history of similar problem:   No     Precipitating factors:   Worsened by:     Alleviating factors:  Improved by:     Therapies Tried and outcome:     Tessa had gestational diabetes during both of her pregnancies and currently is a pre-diabetic. She has been feeling nauseated over the past two weeks. She is also experiencing some hot flashes around dinner time, feeling restless at night, and tingling around her mouth. She is afraid that she may now have Diabetes.     She reports a history of gluten intolerance and is afraid also that she could have Celiac disease. She previously would avoid Gluten but hasn't been avoiding it lately. She has two uncles with celiac disease and a cousin with celiac disease.     She denies any chance of pregnancy and her LMP was 2 weeks ago.    Tells me that right before she started feeling ill she was at a Bachelorett party and consumed at least 8 alcoholic beverages (She doesn't usually drink much at all so this was significantly different than normal for her).    Problem list and histories reviewed & adjusted, as indicated.  Additional history: as documented    Patient Active Problem List   Diagnosis     BRCA gene positive     Hereditary breast and ovarian cancer syndrome associated with mutation in BRCA2 gene (H)     S/P bilateral mastectomy     Status post bilateral breast reconstruction      delivery delivered     White classification A2 gestational diabetes mellitus (GDM)     Past Surgical History:   Procedure Laterality Date     BREAST BIOPSY, RT/LT Right 2015    benign      SECTION  ,          MASTECTOMY, NIPPLE SPARING Bilateral  "6/15/2016    Procedure: MASTECTOMY, NIPPLE SPARING;  Surgeon: Nely Mcqueen MD;  Location: [facility]     RECONSTRUCT BREAST BILATERAL Bilateral 6/15/2016    Procedure: RECONSTRUCT BREAST BILATERAL;  Surgeon: SUZY Arora MD;  Location: [facility]     RECONSTRUCT BREAST SECOND STAGE BILATERAL N/A 9/15/2016    Procedure: RECONSTRUCT BREAST SECOND STAGE BILATERAL;  Surgeon: SUZY Arora MD;  Location: [facility]     TONSILLECTOMY & ADENOIDECTOMY       [facility]         Social History   Substance Use Topics     Smoking status: Never Smoker     Smokeless tobacco: Never Used     Alcohol use No     Family History   Problem Relation Age of Onset     Breast Cancer Mother 49     BRCA2 genetic mutation     Other Cancer Maternal Grandfather 60     prostate (later developed throat cancer)     Genetic Disorder Maternal Aunt      BRCA2 genetic mutation     CANCER Maternal Uncle 70     3 uncles (all 70s and 80s)     Breast Cancer Other 30     maternal cousin; BRCA2 genetic mutation     Other Cancer Other 60     maternal cousin; prostate cancer     Breast Cancer Maternal Aunt 70     Breast Cancer Other 40     maternal cousin     Cancer - colorectal Other 70     Maternal great grandfather     Cancer - colorectal Paternal Uncle 40      in 40s             Reviewed and updated as needed this visit by clinical staff     Reviewed and updated as needed this visit by Provider         ROS:  Constitutional, HEENT, cardiovascular, pulmonary, gi and gu systems are negative, except as otherwise noted.      OBJECTIVE:   BP 98/69 (BP Location: Left arm, Cuff Size: Adult Regular)  Pulse 89  Temp 98.2  F (36.8  C) (Oral)  Ht 5' 6\" (1.676 m)  Wt 173 lb (78.5 kg)  LMP 2017  SpO2 98%  BMI 27.92 kg/m2  Body mass index is 27.92 kg/(m^2).  GENERAL: healthy, alert and no distress  EYES: Eyes grossly normal to inspection, PERRL and conjunctivae and sclerae normal  HENT: ear canals and TM's normal, nose and mouth " without ulcers or lesions  NECK: no adenopathy, no asymmetry, masses, or scars and thyroid normal to palpation  RESP: lungs clear to auscultation - no rales, rhonchi or wheezes  CV: regular rate and rhythm, normal S1 S2, no S3 or S4, no murmur, click or rub, no peripheral edema and peripheral pulses strong  ABDOMEN: soft, nontender, no hepatosplenomegaly, no masses and bowel sounds normal  MS: no gross musculoskeletal defects noted, no edema  SKIN: no suspicious lesions or rashes  NEURO: Normal strength and tone, mentation intact and speech normal  PSYCH: mentation appears normal, tearful and anxious    Diagnostic Test Results:  none     ASSESSMENT/PLAN:   Tessa is a 30 y/o female with history of gestational diabetes, pre-diabetes, and BRCA1 mutation s/p prophylactic bilateral mastectomy, who presents with 2 weeks of generally feeling unwell. She is very concerned that she may have developed Diabetes or Celiac disease. She admits to having had an alcohol binge during a bachelorette party right before feeling ill so this possible could have been due to transient elevation in liver enzymes. This could also be a viral illness that needs to run its course.    1. Pre-diabetes  - Hemoglobin A1c  - CBC with platelets differential  - Comprehensive metabolic panel  - Lipid panel reflex to direct LDL  - TSH with free T4 reflex    2. History of gestational diabetes  - Hemoglobin A1c    3. Family history of celiac disease  - Tissue transglutaminase faye IgA and IgG  - ENDOMYSIAL ANTIBODY TITER  - IgA    4. Nausea      Depending on laboratory results, will notify patient and determine appropriate follow up      Shameka Tovar MD  [facility]

## 2017-08-29 NOTE — MR AVS SNAPSHOT
After Visit Summary   8/29/2017    Tessa Ospina    MRN: [id number]           Patient Information     Date Of Birth          1985        Visit Information        Provider Department      8/29/2017 9:00 AM Shameka Tovar MD [facility] [place]        Today's Diagnoses     Pre-diabetes    -  1    History of gestational diabetes        Family history of celiac disease        Nausea           Follow-ups after your visit        Your next 10 appointments already scheduled     Jun 07, 2018  8:00 AM CDT   Return Visit with MESHA Conrad CNS   Cancer Risk Management Program ([facility])    [facility] [place]  [address]  [place] [address] [phone]              Who to contact     If you have questions or need follow up information about today's clinic visit or your schedule please contact [facility] [place] directly at [phone].  Normal or non-critical lab and imaging results will be communicated to you by [employer], letter or phone within 4 business days after the clinic has received the results. If you do not hear from us within 7 days, please contact the clinic through [employer] or phone. If you have a critical or abnormal lab result, we will notify you by phone as soon as possible.  Submit refill requests through [email or web address] or call your pharmacy and they will forward the refill request to us. Please allow 3 business days for your refill to be completed.          Additional Information About Your Visit        MyChart Information     [email or web address] gives you secure access to your electronic health record. If you see a primary care provider, you can also send messages to your care team and make appointments. If you have questions, please call your primary care clinic.  If you do not have a primary care provider, please call [phone] and they will assist you.        Care EveryWhere ID     This is your Care EveryWhere ID.  "This could be used by other organizations to access your [place] medical records  [id number]        Your Vitals Were     Pulse Temperature Height Last Period Pulse Oximetry BMI (Body Mass Index)    89 98.2  F (36.8  C) (Oral) 5' 6\" (1.676 m) 08/14/2017 98% 27.92 kg/m2       Blood Pressure from Last 3 Encounters:   08/29/17 98/69   06/01/17 95/63   09/15/16 94/64    Weight from Last 3 Encounters:   08/29/17 173 lb (78.5 kg)   06/01/17 172 lb (78 kg)   09/15/16 170 lb 6.7 oz (77.3 kg)              We Performed the Following     CBC with platelets differential     Comprehensive metabolic panel     ENDOMYSIAL ANTIBODY TITER     Hemoglobin A1c     IgA     Lipid panel reflex to direct LDL     Tissue transglutaminase faye IgA and IgG     TSH with free T4 reflex        Primary Care Provider    Physician No Ref-Primary       No address on file        Equal Access to Services     MEGAN DIOP : Hadii torsten Rosales, waaxda lukerline, qaybta kaalmada adesergioyada, jes lopez . So [facility] [phone].    ATENCIÓN: Si habla español, tiene a cordero disposición servicios gratuitos de asistencia lingüística. Llame al [phone].    We comply with applicable federal civil rights laws and [place] laws. We do not discriminate on the basis of race, color, national origin, age, disability sex, sexual orientation or gender identity.            Thank you!     Thank you for choosing [facility] [place]  for your care. Our goal is always to provide you with excellent care. Hearing back from our patients is one way we can continue to improve our services. Please take a few minutes to complete the written survey that you may receive in the mail after your visit with us. Thank you!             Your Updated Medication List - Protect others around you: Learn how to safely use, store and throw away your medicines at www.disposemymeds.org.      Notice  As of 8/29/2017  9:19 AM    You have not been " prescribed any medications.

## 2017-08-30 LAB
IGA SERPL-MCNC: 109 MG/DL (ref 70–380)
TTG IGA SER-ACNC: <1 U/ML
TTG IGG SER-ACNC: <1 U/ML

## 2017-09-19 ENCOUNTER — TELEPHONE (OUTPATIENT)
Dept: PLASTIC SURGERY | Facility: CLINIC | Age: 32
End: 2017-09-19

## 2017-09-19 ENCOUNTER — OFFICE VISIT (OUTPATIENT)
Dept: PLASTIC SURGERY | Facility: CLINIC | Age: 32
End: 2017-09-19
Attending: PLASTIC SURGERY
Payer: COMMERCIAL

## 2017-09-19 VITALS
SYSTOLIC BLOOD PRESSURE: 111 MMHG | BODY MASS INDEX: 28.15 KG/M2 | TEMPERATURE: 98.4 F | RESPIRATION RATE: 16 BRPM | OXYGEN SATURATION: 96 % | DIASTOLIC BLOOD PRESSURE: 65 MMHG | HEART RATE: 84 BPM | WEIGHT: 174.4 LBS

## 2017-09-19 DIAGNOSIS — Z98.890 STATUS POST BILATERAL BREAST RECONSTRUCTION: Primary | ICD-10-CM

## 2017-09-19 PROCEDURE — 99212 OFFICE O/P EST SF 10 MIN: CPT | Mod: ZF

## 2017-09-19 RX ORDER — ACETAMINOPHEN 325 MG/1
325-650 TABLET ORAL
COMMUNITY
Start: 2011-06-29 | End: 2017-10-25

## 2017-09-19 RX ORDER — GLYBURIDE 5 MG/1
10 TABLET ORAL
COMMUNITY
End: 2017-10-25

## 2017-09-19 RX ORDER — IBUPROFEN 600 MG/1
600 TABLET, FILM COATED ORAL
COMMUNITY
Start: 2013-11-27 | End: 2017-10-25

## 2017-09-19 RX ORDER — CHLORAL HYDRATE 500 MG
CAPSULE ORAL
COMMUNITY
End: 2017-10-25

## 2017-09-19 RX ORDER — IBUPROFEN 200 MG
200-600 TABLET ORAL
COMMUNITY
Start: 2011-06-29 | End: 2017-10-25

## 2017-09-19 ASSESSMENT — PAIN SCALES - GENERAL: PAINLEVEL: NO PAIN (0)

## 2017-09-19 NOTE — TELEPHONE ENCOUNTER
Financial counselor has spoken to patient and was given quote for cosmetic abdominoplasty at $6,400.

## 2017-09-19 NOTE — LETTER
9/19/2017       RE: Tessa Ospina  [address]  [facility] [address]     Dear Colleague,    Thank you for referring your patient, Tessa Ospina, to the [facility] at [facility]. Please see a copy of my visit note below.    PRESENTING COMPLAINT:  Postoperative visit, status post bilateral breast reconstruction last surgery done on 09/15/2016.  Surgery was done for high risk for breast cancer.      HISTORY OF PRESENTING COMPLAINT:  Ms. Ospina is 31 years old.  She is about a year out from her last surgery.  Overall happy with the size and overall reconstruction but is concerned about a couple of things.  First, she has significant rippling in the upper pole and some indentation in the upper pole and secondly she feels that her breasts are very droopy and she would like to discuss improving these aspects.  Additionally, she is interested in a tummy tuck.  No change in her history.      PHYSICAL EXAMINATION:  On exam vital signs stable.  She is afebrile in no obvious distress.  Both breasts are healed, symmetric, have grade 2 ptosis with upper pole rippling and indentations.      ASSESSMENT AND PLAN:  Based on above findings, a diagnosis of bilateral breast reconstruction for high risk for breast cancer was made.  Based on her findings today, I think she would benefit from bilateral periareolar mastopexies and upper pole bilateral fat grafting.  I explained to her how this would be done, showed her where the scars would be, explained to her the concept, explained to her the expectations.  Was very clear that over time the breasts will droop again.  Risks of pain, infection, bleeding, scarring, asymmetry, seromas, hematomas, wound breakdown, wound dehiscence, injury to the underlying implant, seromas, hematomas, capsular contracture, implant exposure, implant loss, requirement of further surgeries, change in the fat grafting with weight loss, inability to remove all  the rippling, DVT, PE, MI, CVA, pneumonia, renal failure and death were explained.  With regards to the tummy tuck, I think she is a good candidate from an anatomic standpoint.  She is interested losing about 20 pounds.  My advice was to lose the 20 pounds and then plan a tummy tuck.  Explained to her what that would entail.  She understood everything.  She will probably stage the 2 procedures which I think is reasonable.  All questions were answered.  All exam and discussion done in the presence of my nurse.  I look forward to helping her out in the near future.  Consent obtained.  Photographs obtained.         SUZY TORRES MD             D: 2017 08:36   T: 2017 09:23   MT: nh      Name:     MALCOLM TREJO   MRN:      [id number]        Account:      [id number]   :      1985           Service Date: 2017      Document: [id number]

## 2017-09-19 NOTE — MR AVS SNAPSHOT
After Visit Summary   9/19/2017    Tessa Ospina    MRN: [id number]           Patient Information     Date Of Birth          1985        Visit Information        Provider Department      9/19/2017 8:00 AM SUZY Arora MD [facility]        Today's Diagnoses     Status post bilateral breast reconstruction    -  1       Follow-ups after your visit        Follow-up notes from your care team     Return if symptoms worsen or fail to improve.      Your next 10 appointments already scheduled     Jun 07, 2018  8:00 AM CDT   Return Visit with MESHA Conrad CNS   Cancer Risk Management Program ([facility])    [facility]  [address]  [facility] [address] [phone]              Who to contact     If you have questions or need follow up information about today's clinic visit or your schedule please contact [facility] directly at [phone].  Normal or non-critical lab and imaging results will be communicated to you by MadeClosehart, letter or phone within 4 business days after the clinic has received the results. If you do not hear from us within 7 days, please contact the clinic through OQOt or phone. If you have a critical or abnormal lab result, we will notify you by phone as soon as possible.  Submit refill requests through NotaryAct or call your pharmacy and they will forward the refill request to us. Please allow 3 business days for your refill to be completed.          Additional Information About Your Visit        MyChart Information     NotaryAct gives you secure access to your electronic health record. If you see a primary care provider, you can also send messages to your care team and make appointments. If you have questions, please call your primary care clinic.  If you do not have a primary care provider, please call [phone] and they will assist you.        Care EveryWhere ID     This is your  Care EveryWhere ID. This could be used by other organizations to access your [facility] medical records  [id number]        Your Vitals Were     Pulse Temperature Respirations Last Period Pulse Oximetry BMI (Body Mass Index)    84 98.4  F (36.9  C) (Oral) 16 09/10/2017 96% 28.15 kg/m2       Blood Pressure from Last 3 Encounters:   09/19/17 111/65   08/29/17 98/69   06/01/17 95/63    Weight from Last 3 Encounters:   09/19/17 174 lb 6.4 oz   08/29/17 173 lb   06/01/17 172 lb              Today, you had the following     No orders found for display       Primary Care Provider    Physician No Ref-Primary       No address on file        Equal Access to Services     MEGAN DIOP : Darlyn Rosales, connor cevallos, addi norman, jes lopez . So [facility] [phone].    ATENCIÓN: Si habla español, tiene a cordero disposición servicios gratuitos de asistencia lingüística. Llame al [phone].    We comply with applicable federal civil rights laws and [place] laws. We do not discriminate on the basis of race, color, national origin, age, disability sex, sexual orientation or gender identity.            Thank you!     Thank you for choosing [facility]  for your care. Our goal is always to provide you with excellent care. Hearing back from our patients is one way we can continue to improve our services. Please take a few minutes to complete the written survey that you may receive in the mail after your visit with us. Thank you!             Your Updated Medication List - Protect others around you: Learn how to safely use, store and throw away your medicines at www.disposemymeds.org.          This list is accurate as of: 9/19/17  9:27 AM.  Always use your most recent med list.                   Brand Name Dispense Instructions for use Diagnosis    acetaminophen 325 MG tablet    TYLENOL     Take 325-650 mg by mouth        fish oil-omega-3 fatty acids 1000 MG capsule            glyBURIDE 5 MG tablet    DIABETA /MICRONASE     Take 10 mg by mouth        * ibuprofen 200 MG tablet    ADVIL/MOTRIN     Take 200-600 mg by mouth        * ibuprofen 600 MG tablet    ADVIL/MOTRIN     Take 600 mg by mouth        KETOSTIX Strp   Generic drug:  acetone (Urine) test      For personal use.        UNABLE TO FIND      PRENATAL VITS W-CA,FE,FA,<1MG, (PRENATAL VITAMIN ORAL)        * Notice:  This list has 2 medication(s) that are the same as other medications prescribed for you. Read the directions carefully, and ask your doctor or other care provider to review them with you.

## 2017-09-19 NOTE — PROGRESS NOTES
PRESENTING COMPLAINT:  Postoperative visit, status post bilateral breast reconstruction last surgery done on 09/15/2016.  Surgery was done for high risk for breast cancer.      HISTORY OF PRESENTING COMPLAINT:  Ms. Ospina is 31 years old.  She is about a year out from her last surgery.  Overall happy with the size and overall reconstruction but is concerned about a couple of things.  First, she has significant rippling in the upper pole and some indentation in the upper pole and secondly she feels that her breasts are very droopy and she would like to discuss improving these aspects.  Additionally, she is interested in a tummy tuck.  No change in her history.      PHYSICAL EXAMINATION:  On exam vital signs stable.  She is afebrile in no obvious distress.  Both breasts are healed, symmetric, have grade 2 ptosis with upper pole rippling and indentations.      ASSESSMENT AND PLAN:  Based on above findings, a diagnosis of bilateral breast reconstruction for high risk for breast cancer was made.  Based on her findings today, I think she would benefit from bilateral periareolar mastopexies and upper pole bilateral fat grafting.  I explained to her how this would be done, showed her where the scars would be, explained to her the concept, explained to her the expectations.  Was very clear that over time the breasts will droop again.  Risks of pain, infection, bleeding, scarring, asymmetry, seromas, hematomas, wound breakdown, wound dehiscence, injury to the underlying implant, seromas, hematomas, capsular contracture, implant exposure, implant loss, requirement of further surgeries, change in the fat grafting with weight loss, inability to remove all the rippling, DVT, PE, MI, CVA, pneumonia, renal failure and death were explained.  With regards to the tummy tuck, I think she is a good candidate from an anatomic standpoint.  She is interested losing about 20 pounds.  My advice was to lose the 20 pounds and then plan a tummy  lavon.  Explained to her what that would entail.  She understood everything.  She will probably stage the 2 procedures which I think is reasonable.  All questions were answered.  All exam and discussion done in the presence of my nurse.  I look forward to helping her out in the near future.  Consent obtained.  Photographs obtained.         SUZY TORRES MD             D: 2017 08:36   T: 2017 09:23   MT: nh      Name:     MALCOLM TREJO   MRN:      [id number]        Account:      [id number]   :      1985           Service Date: 2017      Document: [id number]

## 2017-09-20 ENCOUNTER — TELEPHONE (OUTPATIENT)
Dept: SURGERY | Facility: CLINIC | Age: 32
End: 2017-09-20

## 2017-10-25 ENCOUNTER — OFFICE VISIT (OUTPATIENT)
Dept: FAMILY MEDICINE | Facility: CLINIC | Age: 32
End: 2017-10-25
Payer: COMMERCIAL

## 2017-10-25 VITALS — WEIGHT: 172 LBS | HEIGHT: 66 IN | BODY MASS INDEX: 27.64 KG/M2

## 2017-10-25 DIAGNOSIS — Z15.02 HEREDITARY BREAST AND OVARIAN CANCER SYNDROME ASSOCIATED WITH MUTATION IN BRCA2 GENE: ICD-10-CM

## 2017-10-25 DIAGNOSIS — Z15.01 BRCA GENE POSITIVE: ICD-10-CM

## 2017-10-25 DIAGNOSIS — Z15.09 BRCA GENE POSITIVE: ICD-10-CM

## 2017-10-25 DIAGNOSIS — Z01.818 PREOP GENERAL PHYSICAL EXAM: Primary | ICD-10-CM

## 2017-10-25 DIAGNOSIS — Z15.01 HEREDITARY BREAST AND OVARIAN CANCER SYNDROME ASSOCIATED WITH MUTATION IN BRCA2 GENE: ICD-10-CM

## 2017-10-25 PROCEDURE — 99214 OFFICE O/P EST MOD 30 MIN: CPT | Performed by: INTERNAL MEDICINE

## 2017-10-25 NOTE — MR AVS SNAPSHOT
After Visit Summary   10/25/2017    Tessa Ospina    MRN: [id number]           Patient Information     Date Of Birth          1985        Visit Information        Provider Department      10/25/2017 10:20 AM Shameka Tovar MD [facility]        Today's Diagnoses     Preop general physical exam    -  1      Care Instructions      Before Your Surgery      Call your surgeon if there is any change in your health. This includes signs of a cold or flu (such as a sore throat, runny nose, cough, rash or fever).    Do not smoke, drink alcohol or take over the counter medicine (unless your surgeon or primary care doctor tells you to) for the 24 hours before and after surgery.    If you take prescribed drugs: Follow your doctor s orders about which medicines to take and which to stop until after surgery.    Eating and drinking prior to surgery: follow the instructions from your surgeon    Take a shower or bath the night before surgery. Use the soap your surgeon gave you to gently clean your skin. If you do not have soap from your surgeon, use your regular soap. Do not shave or scrub the surgery site.  Wear clean pajamas and have clean sheets on your bed.           Follow-ups after your visit        Your next 10 appointments already scheduled     Nov 02, 2017   Procedure with SUZY Arora MD   [facility] ([facility])    [address] [place] [address]   [phone]           Located in the Clinics and Surgery Center at [address] [place] [address].   parking is very convenient and highly recommended.  is a $6 flat rate fee.  Both  and self parkers should enter the main arrival plaza from [facility]; parking attendants will direct you based on your parking preference.            Nov 07, 2017  8:30 AM CST   (Arrive by 8:15 AM)   Post-Op with SUZY Arora MD     "[facility] ([facility])    [address]  2nd Floor  [facility] [address] [phone]            Jun 07, 2018  8:00 AM CDT   Return Visit with MESHA Conrad CNS   Cancer Risk Management Program ([facility])    [facility] [place] [facility]  [address]  [facility] [address] [phone]              Who to contact     If you have questions or need follow up information about today's clinic visit or your schedule please contact [facility] [place] directly at [phone].  Normal or non-critical lab and imaging results will be communicated to you by MyChart, letter or phone within 4 business days after the clinic has received the results. If you do not hear from us within 7 days, please contact the clinic through [email or web address] or phone. If you have a critical or abnormal lab result, we will notify you by phone as soon as possible.  Submit refill requests through [employer] or call your pharmacy and they will forward the refill request to us. Please allow 3 business days for your refill to be completed.          Additional Information About Your Visit        MyChart Information     [employer] gives you secure access to your electronic health record. If you see a primary care provider, you can also send messages to your care team and make appointments. If you have questions, please call your primary care clinic.  If you do not have a primary care provider, please call [phone] and they will assist you.        Care EveryWhere ID     This is your Care EveryWhere ID. This could be used by other organizations to access your [place] medical records  [id number]        Your Vitals Were     Height Last Period BMI (Body Mass Index)             5' 6\" (1.676 m) 10/02/2017 27.76 kg/m2          Blood Pressure from Last 3 Encounters:   09/19/17 111/65   08/29/17 98/69   06/01/17 95/63    Weight from Last 3 Encounters:   10/25/17 " 172 lb (78 kg)   09/19/17 174 lb 6.4 oz (79.1 kg)   08/29/17 173 lb (78.5 kg)              Today, you had the following     No orders found for display         Today's Medication Changes          These changes are accurate as of: 10/25/17 10:44 AM.  If you have any questions, ask your nurse or doctor.               Stop taking these medicines if you haven't already. Please contact your care team if you have questions.     ibuprofen 600 MG tablet   Commonly known as:  ADVIL/MOTRIN   Stopped by:  Shameka Tovar MD                    Primary Care Provider Office Phone # Fax #    Shameka Tovar [phone]       [address]  [place] [address]        Equal Access to Services     [facility]: Hadii torsten roberto Soaida, waaxda luqadaha, qaybta kaalmada adeegyada, jes lopez . So [facility] [phone].    ATENCIÓN: Si habla español, tiene a cordero disposición servicios gratuitos de asistencia lingüística. Llame al [phone].    We comply with applicable federal civil rights laws and [place] laws. We do not discriminate on the basis of race, color, national origin, age, disability, sex, sexual orientation, or gender identity.            Thank you!     [facility]  [facility]. Our goal is always to provide you with excellent care. Hearing back from our patients is one way we can continue to improve our services. Please take a few minutes to complete the written survey that you may receive in the mail after your visit with us. Thank you!             Your Updated Medication List - Protect others around you: Learn how to safely use, store and throw away your medicines at www.disposemymeds.org.      Notice  As of 10/25/2017 10:44 AM    You have not been prescribed any medications.

## 2017-10-25 NOTE — PROGRESS NOTES
[facility]  [address] [address]  [phone]  Dept: [phone]    PRE-OP EVALUATION:  Today's date: 10/25/2017    Tessa Ospina (: 1985) presents for pre-operative evaluation assessment as requested by Dr. Arora She requires evaluation and anesthesia risk assessment prior to undergoing surgery/procedure for treatment of breast reconstruction .  Proposed procedure: Fat grafting and mastectomy reconstruction     Date of Surgery/ Procedure: 2017  Time of Surgery/ Procedure: 8:35 am   Hospital/Surgical Facility: [facility]   Fax number for surgical facility: in chart at [place]   Primary Physician: Shameka Tovar  Type of Anesthesia Anticipated: General    Patient has a Health Care Directive or Living Will:  YES     Preop Questions 10/24/2017   1.  Do you have a history of heart attack, stroke, stent, bypass or surgery on an artery in the head, neck, heart or legs? No   2.  Do you ever have any pain or discomfort in your chest? No   3.  Do you have a history of  Heart Failure? No   4.   Are you troubled by shortness of breath when:  walking on a level surface, or up a slight hill, or at night? No   5.  Do you currently have a cold, bronchitis or other respiratory infection? No   6.  Do you have a cough, shortness of breath, or wheezing? No   7.  Do you sometimes get pains in the calves of your legs when you walk? No   8. Do you or anyone in your family have previous history of blood clots? No   9.  Do you or does anyone in your family have a serious bleeding problem such as prolonged bleeding following surgeries or cuts? No   10. Have you ever had problems with anemia or been told to take iron pills? No   11. Have you had any abnormal blood loss such as black, tarry or bloody stools, or abnormal vaginal bleeding? No   12. Have you ever had a blood transfusion? No   13. Have you or any of your relatives ever had problems with  anesthesia? No   14. Do you have sleep apnea, excessive snoring or daytime drowsiness? No   15. Do you have any prosthetic heart valves? No   16. Do you have prosthetic joints? No   17. Is there any chance that you may be pregnant? No           HPI:                                                      Brief HPI related to upcoming procedure: Received a bilateral mastectomy in 2016 and first reconstruction in 2016. She is BRCA gene positive.       See problem list for active medical problems.  Problems all longstanding and stable, except as noted/documented.  See ROS for pertinent symptoms related to these conditions.                                                                                                  .    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     Past Surgical History:   Procedure Laterality Date     BREAST BIOPSY, RT/LT Right 2015    benign      SECTION  ,          MASTECTOMY, NIPPLE SPARING Bilateral 6/15/2016    Procedure: MASTECTOMY, NIPPLE SPARING;  Surgeon: Nely Mcqueen MD;  Location: [facility] OR     RECONSTRUCT BREAST BILATERAL Bilateral 6/15/2016    Procedure: RECONSTRUCT BREAST BILATERAL;  Surgeon: SUZY Arora MD;   "Location: [facility] OR     RECONSTRUCT BREAST SECOND STAGE BILATERAL N/A 9/15/2016    Procedure: RECONSTRUCT BREAST SECOND STAGE BILATERAL;  Surgeon: SUZY Arora MD;  Location: [facility] OR     TONSILLECTOMY & ADENOIDECTOMY  2007     [facility]       No current outpatient prescriptions on file.     OTC products: None, except as noted above    Allergies   Allergen Reactions     Percocet [Oxycodone-Acetaminophen] Nausea and Vomiting     Vicodin [Hydrocodone-Acetaminophen] Nausea and Vomiting     Sensitivity to narcotics.      Latex Allergy: NO    Social History   Substance Use Topics     Smoking status: Never Smoker     Smokeless tobacco: Never Used     Alcohol use No     History   Drug Use No       REVIEW OF SYSTEMS:                                                    Constitutional, neuro, ENT, endocrine, pulmonary, cardiac, gastrointestinal, genitourinary, musculoskeletal, integument and psychiatric systems are negative, except as otherwise noted.      EXAM:                                                    Ht 5' 6\" (1.676 m)  Wt 172 lb (78 kg)  LMP 10/02/2017  BMI 27.76 kg/m2    GENERAL APPEARANCE: healthy, alert and no distress     EYES: EOMI, PERRL     HENT: ear canals and TM's normal and nose and mouth without ulcers or lesions     NECK: no adenopathy, no asymmetry, masses, or scars and thyroid normal to palpation     RESP: lungs clear to auscultation - no rales, rhonchi or wheezes     CV: regular rates and rhythm, normal S1 S2, no S3 or S4 and no murmur, click or rub     ABDOMEN:  soft, nontender, no HSM or masses and bowel sounds normal     MS: extremities normal- no gross deformities noted, no evidence of inflammation in joints, FROM in all extremities.     SKIN: no suspicious lesions or rashes     NEURO: Normal strength and tone, sensory exam grossly normal, mentation intact and speech normal     PSYCH: mentation appears normal. and affect normal/bright     LYMPHATICS: No axillary, cervical, or " supraclavicular nodes    DIAGNOSTICS:                                                    No labs or EKG required     Recent Labs   Lab Test  08/29/17   0922  09/12/16   1843   HGB  14.5  13.4   PLT  258   --    NA  138   --    POTASSIUM  4.0   --    CR  0.76   --    A1C  4.9   --         IMPRESSION:                                                    Reason for surgery/procedure: Bilateral breast reconstruction  Diagnosis/reason for consult: Pre-op for the above    The proposed surgical procedure is considered INTERMEDIATE risk.    REVISED CARDIAC RISK INDEX  The patient has the following serious cardiovascular risks for perioperative complications such as (MI, PE, VFib and 3  AV Block):  No serious cardiac risks  INTERPRETATION: 0 risks: Class I (very low risk - 0.4% complication rate)    The patient has the following additional risks for perioperative complications:  No identified additional risks      ICD-10-CM    1. Preop general physical exam Z01.818    2. BRCA gene positive Z15.01     Z15.02    3. 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    [facility] Preop Guidelines

## 2017-10-31 ENCOUNTER — ANESTHESIA EVENT (OUTPATIENT)
Dept: SURGERY | Facility: AMBULATORY SURGERY CENTER | Age: 32
End: 2017-10-31

## 2017-11-02 ENCOUNTER — SURGERY (OUTPATIENT)
Age: 32
End: 2017-11-02

## 2017-11-02 ENCOUNTER — ANESTHESIA (OUTPATIENT)
Dept: SURGERY | Facility: AMBULATORY SURGERY CENTER | Age: 32
End: 2017-11-02

## 2017-11-02 ENCOUNTER — HOSPITAL ENCOUNTER (OUTPATIENT)
Facility: AMBULATORY SURGERY CENTER | Age: 32
End: 2017-11-02
Attending: PLASTIC SURGERY

## 2017-11-02 VITALS
HEIGHT: 66 IN | TEMPERATURE: 97.6 F | SYSTOLIC BLOOD PRESSURE: 108 MMHG | BODY MASS INDEX: 27.32 KG/M2 | OXYGEN SATURATION: 98 % | DIASTOLIC BLOOD PRESSURE: 68 MMHG | WEIGHT: 170 LBS | RESPIRATION RATE: 16 BRPM | HEART RATE: 97 BPM

## 2017-11-02 DIAGNOSIS — Z98.890 S/P BREAST RECONSTRUCTION, BILATERAL: Primary | ICD-10-CM

## 2017-11-02 LAB
HCG UR QL: NEGATIVE
INTERNAL QC OK POCT: YES

## 2017-11-02 RX ORDER — FENTANYL CITRATE 50 UG/ML
25-50 INJECTION, SOLUTION INTRAMUSCULAR; INTRAVENOUS EVERY 5 MIN PRN
Status: DISCONTINUED | OUTPATIENT
Start: 2017-11-02 | End: 2017-11-02 | Stop reason: HOSPADM

## 2017-11-02 RX ORDER — NALOXONE HYDROCHLORIDE 0.4 MG/ML
.1-.4 INJECTION, SOLUTION INTRAMUSCULAR; INTRAVENOUS; SUBCUTANEOUS
Status: DISCONTINUED | OUTPATIENT
Start: 2017-11-02 | End: 2017-11-03 | Stop reason: HOSPADM

## 2017-11-02 RX ORDER — LIDOCAINE 40 MG/G
CREAM TOPICAL
Status: DISCONTINUED | OUTPATIENT
Start: 2017-11-02 | End: 2017-11-02 | Stop reason: HOSPADM

## 2017-11-02 RX ORDER — BUPIVACAINE HYDROCHLORIDE 2.5 MG/ML
INJECTION, SOLUTION EPIDURAL; INFILTRATION; INTRACAUDAL PRN
Status: DISCONTINUED | OUTPATIENT
Start: 2017-11-02 | End: 2017-11-02

## 2017-11-02 RX ORDER — PROPOFOL 10 MG/ML
INJECTION, EMULSION INTRAVENOUS PRN
Status: DISCONTINUED | OUTPATIENT
Start: 2017-11-02 | End: 2017-11-02

## 2017-11-02 RX ORDER — HYDRALAZINE HYDROCHLORIDE 20 MG/ML
2.5-5 INJECTION INTRAMUSCULAR; INTRAVENOUS EVERY 10 MIN PRN
Status: DISCONTINUED | OUTPATIENT
Start: 2017-11-02 | End: 2017-11-02 | Stop reason: HOSPADM

## 2017-11-02 RX ORDER — FENTANYL CITRATE 50 UG/ML
25-50 INJECTION, SOLUTION INTRAMUSCULAR; INTRAVENOUS
Status: DISCONTINUED | OUTPATIENT
Start: 2017-11-02 | End: 2017-11-03 | Stop reason: HOSPADM

## 2017-11-02 RX ORDER — SODIUM CHLORIDE, SODIUM LACTATE, POTASSIUM CHLORIDE, CALCIUM CHLORIDE 600; 310; 30; 20 MG/100ML; MG/100ML; MG/100ML; MG/100ML
INJECTION, SOLUTION INTRAVENOUS CONTINUOUS
Status: DISCONTINUED | OUTPATIENT
Start: 2017-11-02 | End: 2017-11-02 | Stop reason: HOSPADM

## 2017-11-02 RX ORDER — ONDANSETRON 4 MG/1
4 TABLET, ORALLY DISINTEGRATING ORAL EVERY 30 MIN PRN
Status: DISCONTINUED | OUTPATIENT
Start: 2017-11-02 | End: 2017-11-03 | Stop reason: HOSPADM

## 2017-11-02 RX ORDER — FENTANYL CITRATE 50 UG/ML
25-50 INJECTION, SOLUTION INTRAMUSCULAR; INTRAVENOUS
Status: DISCONTINUED | OUTPATIENT
Start: 2017-11-02 | End: 2017-11-02 | Stop reason: HOSPADM

## 2017-11-02 RX ORDER — AMOXICILLIN 250 MG
1-2 CAPSULE ORAL 2 TIMES DAILY
Qty: 30 TABLET | Refills: 0 | Status: SHIPPED | OUTPATIENT
Start: 2017-11-02 | End: 2017-11-21

## 2017-11-02 RX ORDER — LIDOCAINE HYDROCHLORIDE 20 MG/ML
INJECTION, SOLUTION INFILTRATION; PERINEURAL PRN
Status: DISCONTINUED | OUTPATIENT
Start: 2017-11-02 | End: 2017-11-02

## 2017-11-02 RX ORDER — MEPERIDINE HYDROCHLORIDE 25 MG/ML
12.5 INJECTION INTRAMUSCULAR; INTRAVENOUS; SUBCUTANEOUS
Status: DISCONTINUED | OUTPATIENT
Start: 2017-11-02 | End: 2017-11-03 | Stop reason: HOSPADM

## 2017-11-02 RX ORDER — SODIUM CHLORIDE, SODIUM LACTATE, POTASSIUM CHLORIDE, CALCIUM CHLORIDE 600; 310; 30; 20 MG/100ML; MG/100ML; MG/100ML; MG/100ML
INJECTION, SOLUTION INTRAVENOUS CONTINUOUS
Status: DISCONTINUED | OUTPATIENT
Start: 2017-11-02 | End: 2017-11-03 | Stop reason: HOSPADM

## 2017-11-02 RX ORDER — FLUMAZENIL 0.1 MG/ML
0.2 INJECTION, SOLUTION INTRAVENOUS
Status: DISCONTINUED | OUTPATIENT
Start: 2017-11-02 | End: 2017-11-02 | Stop reason: HOSPADM

## 2017-11-02 RX ORDER — SCOLOPAMINE TRANSDERMAL SYSTEM 1 MG/1
1 PATCH, EXTENDED RELEASE TRANSDERMAL ONCE
Status: COMPLETED | OUTPATIENT
Start: 2017-11-02 | End: 2017-11-02

## 2017-11-02 RX ORDER — PROPOFOL 10 MG/ML
INJECTION, EMULSION INTRAVENOUS CONTINUOUS PRN
Status: DISCONTINUED | OUTPATIENT
Start: 2017-11-02 | End: 2017-11-02

## 2017-11-02 RX ORDER — ONDANSETRON 4 MG/1
4 TABLET, ORALLY DISINTEGRATING ORAL EVERY 6 HOURS PRN
Qty: 8 TABLET | Refills: 0 | Status: SHIPPED | OUTPATIENT
Start: 2017-11-02 | End: 2017-11-21

## 2017-11-02 RX ORDER — TRAMADOL HYDROCHLORIDE 50 MG/1
50-100 TABLET ORAL EVERY 6 HOURS PRN
Qty: 30 TABLET | Refills: 0 | Status: SHIPPED | OUTPATIENT
Start: 2017-11-02 | End: 2017-11-21

## 2017-11-02 RX ORDER — FENTANYL CITRATE 50 UG/ML
INJECTION, SOLUTION INTRAMUSCULAR; INTRAVENOUS PRN
Status: DISCONTINUED | OUTPATIENT
Start: 2017-11-02 | End: 2017-11-02

## 2017-11-02 RX ORDER — HYDROMORPHONE HYDROCHLORIDE 1 MG/ML
.3-.5 INJECTION, SOLUTION INTRAMUSCULAR; INTRAVENOUS; SUBCUTANEOUS EVERY 10 MIN PRN
Status: DISCONTINUED | OUTPATIENT
Start: 2017-11-02 | End: 2017-11-03 | Stop reason: HOSPADM

## 2017-11-02 RX ORDER — GABAPENTIN 300 MG/1
300 CAPSULE ORAL ONCE
Status: COMPLETED | OUTPATIENT
Start: 2017-11-02 | End: 2017-11-02

## 2017-11-02 RX ORDER — ONDANSETRON 2 MG/ML
4 INJECTION INTRAMUSCULAR; INTRAVENOUS EVERY 30 MIN PRN
Status: DISCONTINUED | OUTPATIENT
Start: 2017-11-02 | End: 2017-11-03 | Stop reason: HOSPADM

## 2017-11-02 RX ORDER — ACETAMINOPHEN 325 MG/1
975 TABLET ORAL ONCE
Status: COMPLETED | OUTPATIENT
Start: 2017-11-02 | End: 2017-11-02

## 2017-11-02 RX ORDER — CEFAZOLIN SODIUM 1 G/3ML
1 INJECTION, POWDER, FOR SOLUTION INTRAMUSCULAR; INTRAVENOUS SEE ADMIN INSTRUCTIONS
Status: DISCONTINUED | OUTPATIENT
Start: 2017-11-02 | End: 2017-11-02 | Stop reason: HOSPADM

## 2017-11-02 RX ORDER — NALOXONE HYDROCHLORIDE 0.4 MG/ML
.1-.4 INJECTION, SOLUTION INTRAMUSCULAR; INTRAVENOUS; SUBCUTANEOUS
Status: DISCONTINUED | OUTPATIENT
Start: 2017-11-02 | End: 2017-11-02 | Stop reason: HOSPADM

## 2017-11-02 RX ORDER — KETOROLAC TROMETHAMINE 30 MG/ML
30 INJECTION, SOLUTION INTRAMUSCULAR; INTRAVENOUS ONCE
Status: DISCONTINUED | OUTPATIENT
Start: 2017-11-02 | End: 2017-11-03 | Stop reason: HOSPADM

## 2017-11-02 RX ORDER — ONDANSETRON 2 MG/ML
INJECTION INTRAMUSCULAR; INTRAVENOUS PRN
Status: DISCONTINUED | OUTPATIENT
Start: 2017-11-02 | End: 2017-11-02

## 2017-11-02 RX ORDER — DEXAMETHASONE SODIUM PHOSPHATE 4 MG/ML
INJECTION, SOLUTION INTRA-ARTICULAR; INTRALESIONAL; INTRAMUSCULAR; INTRAVENOUS; SOFT TISSUE PRN
Status: DISCONTINUED | OUTPATIENT
Start: 2017-11-02 | End: 2017-11-02

## 2017-11-02 RX ADMIN — FENTANYL CITRATE 25 MCG: 50 INJECTION, SOLUTION INTRAMUSCULAR; INTRAVENOUS at 10:55

## 2017-11-02 RX ADMIN — SODIUM CHLORIDE, SODIUM LACTATE, POTASSIUM CHLORIDE, CALCIUM CHLORIDE: 600; 310; 30; 20 INJECTION, SOLUTION INTRAVENOUS at 08:30

## 2017-11-02 RX ADMIN — ONDANSETRON 4 MG: 2 INJECTION INTRAMUSCULAR; INTRAVENOUS at 08:40

## 2017-11-02 RX ADMIN — FENTANYL CITRATE 25 MCG: 50 INJECTION, SOLUTION INTRAMUSCULAR; INTRAVENOUS at 10:50

## 2017-11-02 RX ADMIN — BUPIVACAINE HYDROCHLORIDE 30 ML: 2.5 INJECTION, SOLUTION EPIDURAL; INFILTRATION; INTRACAUDAL at 07:45

## 2017-11-02 RX ADMIN — PROPOFOL 50 MG: 10 INJECTION, EMULSION INTRAVENOUS at 08:39

## 2017-11-02 RX ADMIN — FENTANYL CITRATE 50 MCG: 50 INJECTION, SOLUTION INTRAMUSCULAR; INTRAVENOUS at 09:27

## 2017-11-02 RX ADMIN — ACETAMINOPHEN 975 MG: 325 TABLET ORAL at 07:54

## 2017-11-02 RX ADMIN — PROPOFOL 50 MG: 10 INJECTION, EMULSION INTRAVENOUS at 08:42

## 2017-11-02 RX ADMIN — FENTANYL CITRATE 50 MCG: 50 INJECTION, SOLUTION INTRAMUSCULAR; INTRAVENOUS at 08:56

## 2017-11-02 RX ADMIN — PROPOFOL 200 MG: 10 INJECTION, EMULSION INTRAVENOUS at 08:35

## 2017-11-02 RX ADMIN — SCOLOPAMINE TRANSDERMAL SYSTEM 1 PATCH: 1 PATCH, EXTENDED RELEASE TRANSDERMAL at 08:12

## 2017-11-02 RX ADMIN — DEXAMETHASONE SODIUM PHOSPHATE 4 MG: 4 INJECTION, SOLUTION INTRA-ARTICULAR; INTRALESIONAL; INTRAMUSCULAR; INTRAVENOUS; SOFT TISSUE at 08:40

## 2017-11-02 RX ADMIN — PROPOFOL 100 MG: 10 INJECTION, EMULSION INTRAVENOUS at 09:27

## 2017-11-02 RX ADMIN — PROPOFOL 150 MCG/KG/MIN: 10 INJECTION, EMULSION INTRAVENOUS at 08:37

## 2017-11-02 RX ADMIN — Medication 100 MCG: at 08:53

## 2017-11-02 RX ADMIN — ONDANSETRON 4 MG: 2 INJECTION INTRAMUSCULAR; INTRAVENOUS at 10:13

## 2017-11-02 RX ADMIN — LIDOCAINE HYDROCHLORIDE 80 MG: 20 INJECTION, SOLUTION INFILTRATION; PERINEURAL at 08:35

## 2017-11-02 RX ADMIN — GABAPENTIN 300 MG: 300 CAPSULE ORAL at 07:54

## 2017-11-02 NOTE — IP AVS SNAPSHOT
[facility]    [address] [place] [id number]    Phone:  [phone]    Fax:  [phone]                                       After Visit Summary   11/2/2017    Tessa Ospina    MRN: [id number]           After Visit Summary Signature Page     I have received my discharge instructions, and my questions have been answered. I have discussed any challenges I see with this plan with the nurse or doctor.    ..........................................................................................................................................  Patient/Patient Representative Signature      ..........................................................................................................................................  Patient Representative Print Name and Relationship to Patient    ..................................................               ................................................  Date                                            Time    ..........................................................................................................................................  Reviewed by Signature/Title    ...................................................              ..............................................  Date                                                            Time

## 2017-11-02 NOTE — ANESTHESIA PROCEDURE NOTES
Peripheral Nerve Block Procedure Note    Staff:     Anesthesiologist:  JULIO CESAR ULLOA    Resident/CRNA:  NORM CARVAJAL    Block performed by resident/CRNA in the presence of a teaching physician      Referred By:  SUZY TORRES  Location: Pre-op  Procedure Start/Stop TImes:      11/2/2017 8:10 AM     11/2/2017 8:20 AM    patient identified, IV checked, site marked, risks and benefits discussed, informed consent, monitors and equipment checked, pre-op evaluation, at physician/surgeon's request and post-op pain management      Correct Patient: Yes      Correct Position: Yes      Correct Site: Yes      Correct Procedure: Yes      Correct Laterality:  Yes    Site Marked:  Yes  Procedure details:     Procedure:  Pectoralis    ASA:  2    Laterality:  Bilateral    Position:  Sitting    Sterile Prep: chloraprep      Local skin infiltration:  None    Needle gauge:  21    Needle length (inches):  3.13    Ultrasound: Yes      Ultrasound used to identify targeted nerve, plexus, or vascular structure and placed a needle adjacent to it      Permanent Image entered into patiient's record      Abnormal pain on injection: No      Blood Aspirated: No      Paresthesias:  No    Bleeding at site: No      Test dose negative for signs of intravascular injection: Yes      Bolus via:  Needle    Infusion Method:  Single Shot    Blood aspirated via catheter: No      Complications:  None  Assessment/Narrative:      15 mL 0.25% bupivacaine and 10 mL Exparel injected into each site.    Call with questions or concerns,    Norm Pride MD  Anesthesiology Resident   [phone]

## 2017-11-02 NOTE — ANESTHESIA POSTPROCEDURE EVALUATION
Patient: Tessa Ospina    Procedure(s):  Bilateral Breast mastopexy and Fat Grafting from Abdomen - Wound Class: I-Clean    Diagnosis:Breast Reconstruction  Diagnosis Additional Information: No value filed.    Anesthesia Type:  General    Note:  Anesthesia Post Evaluation    Patient location during evaluation: PACU  Patient participation: Able to fully participate in evaluation  Level of consciousness: sleepy but conscious and responsive to verbal stimuli  Pain management: adequate  Airway patency: patent  Cardiovascular status: acceptable  Respiratory status: acceptable  Hydration status: acceptable     Anesthetic complications: None          Last vitals:  Vitals:    11/02/17 1115 11/02/17 1130 11/02/17 1145   BP: (!) 144/96 108/75 107/68   Pulse:      Resp: 14 16 16   Temp: 36.4  C (97.5  F)     SpO2: 100% 100% 99%         Electronically Signed By: Gaviota Hi MD  November 2, 2017  12:20 PM

## 2017-11-02 NOTE — IP AVS SNAPSHOT
MRN:[id number]                      After Visit Summary   11/2/2017    Tessa Ospina    MRN: [id number]           Thank you!     Thank you for choosing [facility] for your care. Our goal is always to provide you with excellent care. Hearing back from our patients is one way we can continue to improve our services. Please take a few minutes to complete the written survey that you may receive in the mail after you visit with us. Thank you!        Patient Information     Date Of Birth          1985        About your hospital stay     You were admitted on:  November 2, 2017 You last received care in the:  [facility]    You were discharged on:  November 2, 2017       Who to Call     For medical emergencies, please call 911.  For non-urgent questions about your medical care, please call your primary care provider or clinic, [phone]  For questions related to your surgery, please call your surgery clinic        Attending Provider     Provider Specialty    SUZY Arora MD Plastic Surgery       Primary Care Provider Office Phone # Fax #    Shameka Kym Tovar [phone]      Your next 10 appointments already scheduled     Nov 07, 2017  8:30 AM CST   (Arrive by 8:15 AM)   Post-Op with SUZY Arora MD   [facility] ([facility])    [address] [place] [id number] [phone]            Jun 07, 2018  8:00 AM CDT   Return Visit with MESHA Conrad CNS   Cancer Risk Management Program ([facility])    [facility]  [address]  [facility] [address]   [phone]              Further instructions from your care team       FAT GRAFTING POST-OPERATIVE INSTRUCTIONS    Instructions       Have someone drive you home after surgery and help you at home for 1-2      days.      Get plenty of rest.      Follow balanced diet.       Decreased activity may promote constipation, so you may want to add      more raw fruit to your diet, and be sure to increase fluid intake.      Take pain medication as prescribed. Do not take aspirin or any products      containing aspirin unless approved by your surgeon.      Do not drink alcohol when taking pain medications.      Even when not taking pain medications, no alcohol for 3 weeks as it      causes fluid retention.      If you are taking vitamins with iron, resume these as tolerated.      Do not smoke, as smoking delays healing and increases the risk of      complications.    Activities      Do not drive until you are no longer taking any pain medications      (narcotics).      Start walking as soon as possible, this helps to reduce swelling and       lowers the chance of blood clots.      Unless stated on this form, discuss your time off work with your surgeon.    Treated Area Care       You may shower after 24 hours. The ACE wrap (if used) may be rewrapped as needed (if too tight or loose). Use it for support and may subtitute with a sports bra if preferred.  Wear the compression garment (spandex type clothing or the provided sponge and binder) in the area where the liposuction was performed to harvest the fat for the fat injection for 2 weeks post opor per the surgeon's recommendation.      Avoid exposing scars to sun for at least 12 months.      Always use a strong sunblock, if sun exposure is unavoidable (SPF 50 or      greater).      Inspect daily for signs of infection.      No tub soaking, bathing, or swimming while sutures or drains are in place.      You may wear makeup with sunblock protection shortly.      Stay out of the sun until redness and bruising subsides (usually 48      Hours).    What to Expect      Temporary stinging, throbbing, burning sensation, redness, swelling,       bruising, and excess fullness.      Some swelling, bruising or redness in the donor and recipient sites.       "Swelling and puffiness may last several weeks.      Redness and bruising usually lasts about 48 hours.     Appearance      Improved skin texture.      Firmer and smoother skin.    Follow-Up Care      With regular follow-up treatments, you can easily maintain your new       look.      Repeated treatments may be necessary.    When to Call      If you have increased swelling or bruising.      If swelling and redness persist after a few days.      If you have increased redness along the incision.      If you have severe or increased pain not relieved by medication.      If you have any side effects to medications; such as, rash, nausea,      headache, vomiting.      If you have an oral temperature over 100.4 degrees.      If you have any yellowish or greenish drainage from the incisions or      notice a foul odor.      If you have bleeding from the incisions that is difficult to control with      light pressure.      If you have loss of feeling or motion.      If you have any sign of abscesses, open sores, skin peeling or lumpiness.    For Medical Questions, Please Call:      [phone], Monday - Friday, 8 a.m. - 4:30 p.m.      After hours and on weekends, call Hospital Paging at [phone] and      ask for the Plastic Surgeon on call.      Information about liposomal bupivacaine (Exparel)    What is Liposomal Bupivacaine?    Liposomal Bupivacaine is a numbing medication that can help you manage your pain after surgery.  This medication is similar to \"novacaine,\" which is often used by the dentist.  Liposomal bupivacaine is released slowly and can help control pain for up to 72 hours.    What is the purpose of Liposomal Bupivacaine?    To manage your pain after surgery    To help you sleep better, take deep breaths, walk more comfortable, and feel up to visiting with others    How is the procedure done?    Liposomal bupivacaine is a medication given by an injection.    It is usually given right before your surgery.  " If this is the case, you will be awake or sedated, but you should experience minimal pain during the procedure.    For some people, the injection may be given at the very end of your surgery.  It all depends on the type of surgery and your situation.    The procedure usually takes about 5-15 minutes.  An ultrasound machine will help the anesthesiologist insert it in the right place or the surgeon will inject it under direct vision.     A needle is used to place the numbing medication under your skin.  It provides pain relief by numbing the tissue in the area where your surgeon will make the incision.    What can I expect?    You may experience numbness, tingling, or a feeling of heaviness around the area that was injected.    If you experience any of the follow symptoms IMMEDIATELY CALL THE REGIONAL ANESTHESIA PAIN SERVICE:    Numbness or tingling occurs in areas other than around the injection site    Blurry vision    Ringing in your ears    A metallic taste in your mouth    PAGE: Dial [phone].  When prompted, enter the following 4-digit ID number:  [id number].  You will be prompted to enter your phone number; and then enter the # sign.  The clinician on call will call you back.    OR  CALL: Dial [phone].  Let the hospital  know that you are having a problem with a nerve block and that you would like to speak to the regional anesthesia pain service right away.    You should not receive any other type of numbing medication within 4 days after receiving liposomal bupivacaine unless your anesthesiologist approves.    Post Operative Instructions: Regional Anesthetic with Liposomal Bupivacaine for Chest and Abdominal Surgery  General Information:   Regional anesthesia is when local anesthetic or  numbing  medication is injected around the nerves to anesthetize or  numb  the area supplied by that set of nerves.     Types of Regional Blocks:  Transversus Abdominis Plane (TAP): A block injected beneath the  covering of a muscle layer of the abdomen for abdominal surgery  Pectoral: A block injected near the breast for surgery on the breast and armpit  Paravertebral: A block injected in the back for surgery on the chest, ribs, and breast    Procedure:  The type of anesthesia your doctor used to numb your chest or abdomen will usually not wear off for 24-48 hours, but may last as long as 72 hours.     Diet:  There are no restrictions on your diet. You should drink plenty of fluids.     Discomfort:  You will have a tingling and prickly sensation in your chest or abdomen as the feeling begins to return. You can also expect some discomfort. The amount of discomfort is unpredictable, but if you have more pain than can be controlled with pain medication you should notify your physician.     Pain Medicine:   Begin taking your oral pain pills before bedtime and during the night to avoid a sudden onset of pain as part of the block wears off.  Do not engage in drinking, driving, or hazardous occupations while taking pain medication.     Stitches:   You may have stitches or special skin closures. You doctor will inform you when to return to the office to have them removed.       Scopolamine Patch- (Absorbed through the skin)    This medicine prevents nausea and vomiting caused by motion sickness or anesthesia.  The medicine is in a patch worn behind the ear.      Do NOT use the Scopolamine Patch if you have glaucoma or are allergic to scopolamine.    How to Use This Medicine:    The patch is applied behind the ear.    Keep the patch dry to prevent it from falling off.  Limit contact with water (no bathing or swimming).      If the patch is loose or falls off throw it away.  You do not need to apply a new patch.    After you take off the patch or if it falls off, wash your hands and the area behind your ear with soap and water.      You can remove the patch tomorrow, or leave on for up to 3 days.    Only one patch should be used at  any time.    How to Dispose of This Medicine:    Fold the used patch in half with the sticky sides together. Throw any used patch away so that children or pets cannot get to it. You will also need to throw away old patches after the expiration date has passed.    Keep all medicine away from children and never share your medicine with anyone.    Warnings While Using This Medicine:    This medicine can make you sleepy.  Avoid taking sleeping pills and other medicines that can make you sleepy while the patch is on.    Do not drink alcohol while the patch is on.    This medicine can cause temporary blurring and other vision problems if it comes in contact with the eyes.  This is not serious unless accompanied by eye pain and redness.     This medicine may cause problems with urination. If you have problems with urinating, remove the patch.  If you are unable to urinate, call your doctor.      This medicine may make you dizzy or drowsy. Avoid driving, using machines, or doing anything else that could be dangerous if the patch is on.    This medicine may make you sweat less and cause your body to get too hot. Be careful in hot weather or if you are exercising.    Make sure any doctor or dentist who treats you knows that you have the patch on. This medicine may affect the results of certain medical tests.    Skin burns have been reported at the patch site in several patients wearing an aluminized transdermal system during a magnetic resonance imaging scan (MRI).  Since this patch contains aluminum, it is recommended to remove the patch if you are having an MRI.    Possible Side Effects While Using This Medicine:    Dry mouth    Drowsiness    Temporary blurring of vision and widening of the pupils    Call your doctor right away if you notice any of these side effects:    Allergic reaction: Itching or hives, swelling in your face or hands, swelling or tingling in your mouth or throat, chest tightness, trouble  breathing.    Blurred vision that does not go away after the patch is removed    Confusion or memory loss    Fast,slow, or uneven heartbeat    Lightheadedness, dizziness, drowsiness, or fainting    Seeing, hearing, or feeling things that are not there    Restlessness    Severe eye pain    Trouble urinating    If you notice other side effects that you think are caused by this medicine, call your doctor immediately.      [facility]  Home Care Following Anesthesia  For 24 hours after surgery:  1. Get plenty of rest.  A responsible adult must stay with you for at least 24 hours after you leave the surgery center.  2. Do not drive or use heavy equipment.  If you have weakness or tingling, don't drive or use heavy equipment until this feeling goes away.   3. Do not drink alcohol.   4. Avoid strenuous or risky activities.  Ask for help when climbing stairs.  5. You may feel lightheaded.  IF so, sit for a few minutes before standing.  Have someone help you get up.   6. If you have nausea (feel sick to your stomach): Drink only clear liquids such as apple juice, ginger ale, broth or 7-Up.  Rest may also help.  Be sure to drink enough fluids.  Move to a regular diet as you feel able.   7. You may have a slight fever.  Call the doctor if your fever is over 100 F (37.7 C) (taken under the tongue) or lasts longer than 24 hours.  8. You may have a dry mouth, a sore throat, muscle aches or trouble sleeping. These should go away after 24 hours.  9. Do not make important or legal decisions.   If you use hormonal birth control (such as the pill, patch, ring or implants):  You will need a second form of birth control for 7 days (condoms, a diaphragm or contraceptive foam).  While in the surgery center, you received a medicine called Sugammadex.  Hormonal birth control (such as the pill, patch, ring or implants) will not work as well for a week after taking this medicine.         Tips for taking  pain medications  To get the best pain relief possible, remember these points:    Take pain medications as directed, before pain becomes severe.    Pain medication can upset your stomach: taking it with food may help.    Constipation is a common side effect of pain medication. Drink plenty of  fluids.    Eat foods high in fiber. Take a stool softener if recommended by your doctor or pharmacist.    Do not drink alcohol, drive or operate machinery while taking pain medications.    Ask about other ways to control pain, such as with heat, ice or relaxation.    Tylenol/Acetaminophen Consumption  To help encourage the safe use of acetaminophen, the makers of TYLENOL  have lowered the maximum daily dose for single-ingredient Extra Strength TYLENOL  (acetaminophen) products sold in the [place] from 8 pills per day (4,000 mg) to 6 pills per day (3,000 mg). The dosing interval has also changed from 2 pills every 4-6 hours to 2 pills every 6 hours.    If you feel your pain relief is insufficient, you may take Tylenol/Acetaminophen in addition to your narcotic pain medication.     Be careful not to exceed 3,000 mg of Tylenol/Acetaminophen in a 24 hour period from all sources.    If you are taking extra strength Tylenol/acetaminophen (500 mg), the maximum dose is 6 tablets in 24 hours.    If you are taking regular strength acetaminophen (325 mg), the maximum dose is 9 tablets in 24 hours.    Call a doctor for any of the followin. Signs of infection (fever, growing tenderness at the surgery site, a large amount of drainage or bleeding, severe pain, foul-smelling drainage, redness, swelling).  2. It has been over 8 to 10 hours since surgery and you are still not able to urinate (pass water).  3. Headache for over 24 hours.  4. Numbness, tingling or weakness the day after surgery (if you had spinal anesthesia).  Your doctor is:  Dr. Lam Arora, Plastic Surgery: [phone]                    Or dial [phone] and ask for  "the resident on call for:  Plastics  For emergency care, call the:  [facility]:  [phone] (TTY for hearing impaired: [phone])                      Pending Results     Date and Time Order Name Status Description    11/2/2017 0917 Surgical pathology exam In process             Admission Information     Date & Time Provider Department Dept. Phone    11/2/2017 SUZY Arora MD [facility] [phone]      Your Vitals Were     Blood Pressure Pulse Temperature Respirations Height Weight    118/78 97 98.1  F (36.7  C) (Oral) 10 1.676 m (5' 6\") 77.1 kg (170 lb)    Last Period Pulse Oximetry BMI (Body Mass Index)             10/26/2017 98% 27.44 kg/m2         [employer] Information     [employer] gives you secure access to your electronic health record. If you see a primary care provider, you can also send messages to your care team and make appointments. If you have questions, please call your primary care clinic.  If you do not have a primary care provider, please call [phone] and they will assist you.      [employer] is an electronic gateway that provides easy, online access to your medical records. With [employer], you can request a clinic appointment, read your test results, renew a prescription or communicate with your care team.     To access your existing account, please contact your [facility] or call [phone] for assistance.        Care EveryWhere ID     This is your Care EveryWhere ID. This could be used by other organizations to access your [facility] medical records  [id number]        Equal Access to Services     MEGAN DIOP AH: Hadii torsten dukes Soaida, waaxda luqadaha, qaybta kaalmada adeegyarasta, jes idiradha pizano. So [facility] [phone].    ATENCIÓN: Si habla español, tiene a cordero disposición servicios gratuitos de asistencia lingüística. Llame al [phone].    We comply with applicable " federal civil rights laws and [place] laws. We do not discriminate on the basis of race, color, national origin, age, disability, sex, sexual orientation, or gender identity.               Review of your medicines      START taking        Dose / Directions    ondansetron 4 MG ODT tab   Commonly known as:  ZOFRAN-ODT   Used for:  S/P breast reconstruction, bilateral        Dose:  4 mg   Take 1 tablet (4 mg) by mouth every 6 hours as needed for nausea   Quantity:  8 tablet   Refills:  0       senna-docusate 8.6-50 MG per tablet   Commonly known as:  SENOKOT-S;PERICOLACE   Used for:  S/P breast reconstruction, bilateral        Dose:  1-2 tablet   Take 1-2 tablets by mouth 2 times daily   Quantity:  30 tablet   Refills:  0       traMADol 50 MG tablet   Commonly known as:  ULTRAM   Used for:  S/P breast reconstruction, bilateral        Dose:   mg   Take 1-2 tablets ( mg) by mouth every 6 hours as needed for pain maximum 8 tablet(s) per day   Quantity:  30 tablet   Refills:  0            Where to get your medicines      These medications were sent to [address] [address]    Hours:  TRANSPLANT PHONE NUMBER [phone] Phone:  [phone]     ondansetron 4 MG ODT tab    senna-docusate 8.6-50 MG per tablet         Some of these will need a paper prescription and others can be bought over the counter. Ask your nurse if you have questions.     Bring a paper prescription for each of these medications     traMADol 50 MG tablet                Protect others around you: Learn how to safely use, store and throw away your medicines at www.disposemymeds.org.             Medication List: This is a list of all your medications and when to take them. Check marks below indicate your daily home schedule. Keep this list as a reference.      Medications           Morning Afternoon Evening Bedtime As Needed     ondansetron 4 MG ODT tab   Commonly known as:  ZOFRAN-ODT   Take 1 tablet (4 mg) by mouth every 6 hours as needed for nausea                                senna-docusate 8.6-50 MG per tablet   Commonly known as:  SENOKOT-S;PERICOLACE   Take 1-2 tablets by mouth 2 times daily                                traMADol 50 MG tablet   Commonly known as:  ULTRAM   Take 1-2 tablets ( mg) by mouth every 6 hours as needed for pain maximum 8 tablet(s) per day

## 2017-11-02 NOTE — ANESTHESIA PREPROCEDURE EVALUATION
Pt has had prior anesthetic. Type of anesthetic: Extreme sensitivity to opioids (sedation)    History of anesthetic complications  - PONV     ROS/MED HX    ENT/Pulmonary:  - neg pulmonary ROS     Neurologic:  - neg neurologic ROS     Cardiovascular:  - neg cardiovascular ROS       METS/Exercise Tolerance:     Hematologic:  - neg hematologic  ROS       Musculoskeletal:  - neg musculoskeletal ROS       GI/Hepatic:  - neg GI/hepatic ROS       Renal/Genitourinary:  - ROS Renal section negative       Endo:  - neg endo ROS       Psychiatric:  - neg psychiatric ROS       Infectious Disease:  - neg infectious disease ROS       Malignancy: BRAC 2 positive s/p mastectomy.          Other:         Anesthesia Evaluation     .             ROS/MED HX    ENT/Pulmonary:  - neg pulmonary ROS     Neurologic:  - neg neurologic ROS     Cardiovascular:  - neg cardiovascular ROS       METS/Exercise Tolerance:     Hematologic:  - neg hematologic  ROS       Musculoskeletal:  - neg musculoskeletal ROS       GI/Hepatic:  - neg GI/hepatic ROS       Renal/Genitourinary:  - ROS Renal section negative       Endo:  - neg endo ROS       Psychiatric:  - neg psychiatric ROS       Infectious Disease:         Malignancy:   (+)   BRCA gene positive, prophylactic bilateral mastectomy      - no malignancy   Other:                   Physical Exam  Normal systems: dental    Airway   Mallampati: II  TM distance: >3 FB  Neck ROM: full    Dental     Cardiovascular   Rhythm and rate: regular and normal      Pulmonary    breath sounds clear to auscultation              Anesthesia Plan      History & Physical Review  History and physical reviewed and following examination; no interval change.    ASA Status:  1 .    NPO Status:  > 8 hours    Plan for General, LMA, Peripheral Nerve Block and Periph. Nerve Block for postop pain with Intravenous and Propofol induction. Maintenance will be TIVA.    PONV prophylaxis:  Ondansetron (or other 5HT-3), Dexamethasone or  Solumedrol and Scopolamine patch       Postoperative Care  Postoperative pain management:  IV analgesics and Oral pain medications.      Consents  Anesthetic plan, risks, benefits and alternatives discussed with:  Patient.  Use of blood products discussed: No .   .        Procedure: Procedure(s):  Bilateral Breast Lift and Fat Grafting from Abdomen - Wound Class: I-Clean    HPI: Tessa Ospina is a 32 year old female     PMHx/PSHx:  Past Medical History:   Diagnosis Date     BRCA2 positive      PONV (postoperative nausea and vomiting)     Unsure if this was due to anesthesia and/or vicodin.       Past Surgical History:   Procedure Laterality Date     BREAST BIOPSY, RT/LT Right 2015    benign      SECTION  ,          MASTECTOMY, NIPPLE SPARING Bilateral 6/15/2016    Procedure: MASTECTOMY, NIPPLE SPARING;  Surgeon: Nely Mcqueen MD;  Location: [facility] OR     RECONSTRUCT BREAST BILATERAL Bilateral 6/15/2016    Procedure: RECONSTRUCT BREAST BILATERAL;  Surgeon: SUZY Arora MD;  Location:  OR     RECONSTRUCT BREAST SECOND STAGE BILATERAL N/A 9/15/2016    Procedure: RECONSTRUCT BREAST SECOND STAGE BILATERAL;  Surgeon: SUZY Arora MD;  Location: [facility] OR     TONSILLECTOMY & ADENOIDECTOMY  [address] [place] [address]         No current outpatient prescriptions on file prior to encounter.  No current facility-administered medications on file prior to encounter.     Social Hx:   Social History   Substance Use Topics     Smoking status: Never Smoker     Smokeless tobacco: Never Used     Alcohol use No     Allergies:   Allergies   Allergen Reactions     Percocet [Oxycodone-Acetaminophen] Nausea and Vomiting     Vicodin [Hydrocodone-Acetaminophen] Nausea and Vomiting     Sensitivity to narcotics.       NPO Status: > 8 hours     Labs:    Blood Bank:  No results found for: ABO, RH, AS    BMP:  Recent Labs   Lab Test  17   0922   NA  138   POTASSIUM  4.0   CHLORIDE  105    CO2  23   BUN  13   CR  0.76   GLC  98   ANABELL  9.1     CBC:   Recent Labs   Lab Test  08/29/17   0922   WBC  5.5   RBC  4.75   HGB  14.5   HCT  42.6   MCV  90   MCH  30.5   MCHC  34.0   RDW  12.9   PLT  258       Physical Exam  Normal systems: dental    Airway   Mallampati: I  TM distance: >3 FB  Neck ROM: full    Dental   (+) implants  Comment: Upper and lower front retainer    Cardiovascular   Rhythm and rate: regular and normal      Pulmonary    breath sounds clear to auscultation        Anesthesia Plan      History & Physical Review  History and physical reviewed and following examination; no interval change.    ASA Status:  2 .    NPO Status:  > 8 hours    Plan for General with Intravenous induction. Maintenance will be Inhalation.    PONV prophylaxis:  Ondansetron  Albert Velazquez        Postoperative Care  Postoperative pain management:  IV analgesics.      Consents  Anesthetic plan, risks, benefits and alternatives discussed with:  Patient..        Airway: 06/15/16; 0739; Mask Ventilation: Easy; Ease of Intubation: Easy; Airway Size: 6.5;  Cuffed;  Oral;  Blade Type: Goodman;  Blade Size: 2;  Place by: PHILLIP Gilmore;  Insertion Attempts: 1;  Secured at (cm)to lip: 22 cm;  Breath Sounds: Equal, clear and bilateral;  End Tidal CO2: Present;  Dentition: Intact;  Grade View of Cords: 1    Special Considerations: Preoperative bilateral pectoral block      Plan:   - ASA 2  - General with LMA placement. Standard ASA monitors, IV induction, balanced anesthetic  - PIV x 1   - Antibiotics per Plastic Surgery  - PONV prophylaxis  - Pain management with Fentanyl/dilaudid boluses     Norm Pride MD  Anesthesiology Resident  [phone]

## 2017-11-02 NOTE — ANESTHESIA CARE TRANSFER NOTE
Patient: Tessa Ospina    Procedure(s):  Bilateral Breast mastopexy and Fat Grafting from Abdomen - Wound Class: I-Clean    Diagnosis: Breast Reconstruction  Diagnosis Additional Information: No value filed.    Anesthesia Type:   General     Note:  Airway :Room Air  Patient transferred to:PACU  Comments: Patient awake and breathing spont. VSS. No complaints of pain or nausea. Report to RN.Handoff Report: Identifed the Patient, Identified the Reponsible Provider, Reviewed the pertinent medical history, Discussed the surgical course, Reviewed Intra-OP anesthesia mangement and issues during anesthesia, Set expectations for post-procedure period and Allowed opportunity for questions and acknowledgement of understanding      Vitals: (Last set prior to Anesthesia Care Transfer)    CRNA VITALS  11/2/2017 1005 - 11/2/2017 1045      11/2/2017             Pulse: 102    SpO2: 100 %    Resp Rate (set): 10                Electronically Signed By: MESHA Juarez CRNA  November 2, 2017  10:45 AM

## 2017-11-02 NOTE — DISCHARGE INSTRUCTIONS
FAT GRAFTING POST-OPERATIVE INSTRUCTIONS    Instructions       Have someone drive you home after surgery and help you at home for 1-2      days.      Get plenty of rest.      Follow balanced diet.      Decreased activity may promote constipation, so you may want to add      more raw fruit to your diet, and be sure to increase fluid intake.      Take pain medication as prescribed. Do not take aspirin or any products      containing aspirin unless approved by your surgeon.      Do not drink alcohol when taking pain medications.      Even when not taking pain medications, no alcohol for 3 weeks as it      causes fluid retention.      If you are taking vitamins with iron, resume these as tolerated.      Do not smoke, as smoking delays healing and increases the risk of      complications.    Activities      Do not drive until you are no longer taking any pain medications      (narcotics).      Start walking as soon as possible, this helps to reduce swelling and       lowers the chance of blood clots.      Unless stated on this form, discuss your time off work with your surgeon.    Treated Area Care       You may shower after 24 hours. The ACE wrap (if used) may be rewrapped as needed (if too tight or loose). Use it for support and may subtitute with a sports bra if preferred.  Wear the compression garment (spandex type clothing or the provided sponge and binder) in the area where the liposuction was performed to harvest the fat for the fat injection for 2 weeks post opor per the surgeon's recommendation.      Avoid exposing scars to sun for at least 12 months.      Always use a strong sunblock, if sun exposure is unavoidable (SPF 50 or      greater).      Inspect daily for signs of infection.      No tub soaking, bathing, or swimming while sutures or drains are in place.      You may wear makeup with sunblock protection shortly.      Stay out of the sun until redness and bruising subsides (usually 48      Hours).    What  "to Expect      Temporary stinging, throbbing, burning sensation, redness, swelling,       bruising, and excess fullness.      Some swelling, bruising or redness in the donor and recipient sites.      Swelling and puffiness may last several weeks.      Redness and bruising usually lasts about 48 hours.     Appearance      Improved skin texture.      Firmer and smoother skin.    Follow-Up Care      With regular follow-up treatments, you can easily maintain your new       look.      Repeated treatments may be necessary.    When to Call      If you have increased swelling or bruising.      If swelling and redness persist after a few days.      If you have increased redness along the incision.      If you have severe or increased pain not relieved by medication.      If you have any side effects to medications; such as, rash, nausea,      headache, vomiting.      If you have an oral temperature over 100.4 degrees.      If you have any yellowish or greenish drainage from the incisions or      notice a foul odor.      If you have bleeding from the incisions that is difficult to control with      light pressure.      If you have loss of feeling or motion.      If you have any sign of abscesses, open sores, skin peeling or lumpiness.    For Medical Questions, Please Call:      [phone], Monday - Friday, 8 a.m. - 4:30 p.m.      After hours and on weekends, call Hospital Paging at [phone] and      ask for the Plastic Surgeon on call.      Information about liposomal bupivacaine (Exparel)    What is Liposomal Bupivacaine?    Liposomal Bupivacaine is a numbing medication that can help you manage your pain after surgery.  This medication is similar to \"novacaine,\" which is often used by the dentist.  Liposomal bupivacaine is released slowly and can help control pain for up to 72 hours.    What is the purpose of Liposomal Bupivacaine?    To manage your pain after surgery    To help you sleep better, take deep breaths, " walk more comfortable, and feel up to visiting with others    How is the procedure done?    Liposomal bupivacaine is a medication given by an injection.    It is usually given right before your surgery.  If this is the case, you will be awake or sedated, but you should experience minimal pain during the procedure.    For some people, the injection may be given at the very end of your surgery.  It all depends on the type of surgery and your situation.    The procedure usually takes about 5-15 minutes.  An ultrasound machine will help the anesthesiologist insert it in the right place or the surgeon will inject it under direct vision.     A needle is used to place the numbing medication under your skin.  It provides pain relief by numbing the tissue in the area where your surgeon will make the incision.    What can I expect?    You may experience numbness, tingling, or a feeling of heaviness around the area that was injected.    If you experience any of the follow symptoms IMMEDIATELY CALL THE REGIONAL ANESTHESIA PAIN SERVICE:    Numbness or tingling occurs in areas other than around the injection site    Blurry vision    Ringing in your ears    A metallic taste in your mouth    PAGE: Dial [phone].  When prompted, enter the following 4-digit ID number:  [id number].  You will be prompted to enter your phone number; and then enter the # sign.  The clinician on call will call you back.    OR  CALL: Dial [phone].  Let the hospital  know that you are having a problem with a nerve block and that you would like to speak to the regional anesthesia pain service right away.    You should not receive any other type of numbing medication within 4 days after receiving liposomal bupivacaine unless your anesthesiologist approves.    Post Operative Instructions: Regional Anesthetic with Liposomal Bupivacaine for Chest and Abdominal Surgery  General Information:   Regional anesthesia is when local anesthetic or  numbing   medication is injected around the nerves to anesthetize or  numb  the area supplied by that set of nerves.     Types of Regional Blocks:  Transversus Abdominis Plane (TAP): A block injected beneath the covering of a muscle layer of the abdomen for abdominal surgery  Pectoral: A block injected near the breast for surgery on the breast and armpit  Paravertebral: A block injected in the back for surgery on the chest, ribs, and breast    Procedure:  The type of anesthesia your doctor used to numb your chest or abdomen will usually not wear off for 24-48 hours, but may last as long as 72 hours.     Diet:  There are no restrictions on your diet. You should drink plenty of fluids.     Discomfort:  You will have a tingling and prickly sensation in your chest or abdomen as the feeling begins to return. You can also expect some discomfort. The amount of discomfort is unpredictable, but if you have more pain than can be controlled with pain medication you should notify your physician.     Pain Medicine:   Begin taking your oral pain pills before bedtime and during the night to avoid a sudden onset of pain as part of the block wears off.  Do not engage in drinking, driving, or hazardous occupations while taking pain medication.     Stitches:   You may have stitches or special skin closures. You doctor will inform you when to return to the office to have them removed.       Scopolamine Patch- (Absorbed through the skin)    This medicine prevents nausea and vomiting caused by motion sickness or anesthesia.  The medicine is in a patch worn behind the ear.      Do NOT use the Scopolamine Patch if you have glaucoma or are allergic to scopolamine.    How to Use This Medicine:    The patch is applied behind the ear.    Keep the patch dry to prevent it from falling off.  Limit contact with water (no bathing or swimming).      If the patch is loose or falls off throw it away.  You do not need to apply a new patch.    After you take off  the patch or if it falls off, wash your hands and the area behind your ear with soap and water.      You can remove the patch tomorrow, or leave on for up to 3 days.    Only one patch should be used at any time.    How to Dispose of This Medicine:    Fold the used patch in half with the sticky sides together. Throw any used patch away so that children or pets cannot get to it. You will also need to throw away old patches after the expiration date has passed.    Keep all medicine away from children and never share your medicine with anyone.    Warnings While Using This Medicine:    This medicine can make you sleepy.  Avoid taking sleeping pills and other medicines that can make you sleepy while the patch is on.    Do not drink alcohol while the patch is on.    This medicine can cause temporary blurring and other vision problems if it comes in contact with the eyes.  This is not serious unless accompanied by eye pain and redness.     This medicine may cause problems with urination. If you have problems with urinating, remove the patch.  If you are unable to urinate, call your doctor.      This medicine may make you dizzy or drowsy. Avoid driving, using machines, or doing anything else that could be dangerous if the patch is on.    This medicine may make you sweat less and cause your body to get too hot. Be careful in hot weather or if you are exercising.    Make sure any doctor or dentist who treats you knows that you have the patch on. This medicine may affect the results of certain medical tests.    Skin burns have been reported at the patch site in several patients wearing an aluminized transdermal system during a magnetic resonance imaging scan (MRI).  Since this patch contains aluminum, it is recommended to remove the patch if you are having an MRI.    Possible Side Effects While Using This Medicine:    Dry mouth    Drowsiness    Temporary blurring of vision and widening of the pupils    Call your doctor right  away if you notice any of these side effects:    Allergic reaction: Itching or hives, swelling in your face or hands, swelling or tingling in your mouth or throat, chest tightness, trouble breathing.    Blurred vision that does not go away after the patch is removed    Confusion or memory loss    Fast,slow, or uneven heartbeat    Lightheadedness, dizziness, drowsiness, or fainting    Seeing, hearing, or feeling things that are not there    Restlessness    Severe eye pain    Trouble urinating    If you notice other side effects that you think are caused by this medicine, call your doctor immediately.      [facility]  Home Care Following Anesthesia  For 24 hours after surgery:  1. Get plenty of rest.  A responsible adult must stay with you for at least 24 hours after you leave the surgery center.  2. Do not drive or use heavy equipment.  If you have weakness or tingling, don't drive or use heavy equipment until this feeling goes away.   3. Do not drink alcohol.   4. Avoid strenuous or risky activities.  Ask for help when climbing stairs.  5. You may feel lightheaded.  IF so, sit for a few minutes before standing.  Have someone help you get up.   6. If you have nausea (feel sick to your stomach): Drink only clear liquids such as apple juice, ginger ale, broth or 7-Up.  Rest may also help.  Be sure to drink enough fluids.  Move to a regular diet as you feel able.   7. You may have a slight fever.  Call the doctor if your fever is over 100 F (37.7 C) (taken under the tongue) or lasts longer than 24 hours.  8. You may have a dry mouth, a sore throat, muscle aches or trouble sleeping. These should go away after 24 hours.  9. Do not make important or legal decisions.   If you use hormonal birth control (such as the pill, patch, ring or implants):  You will need a second form of birth control for 7 days (condoms, a diaphragm or contraceptive foam).  While in the surgery center, you  received a medicine called Sugammadex.  Hormonal birth control (such as the pill, patch, ring or implants) will not work as well for a week after taking this medicine.         Tips for taking pain medications  To get the best pain relief possible, remember these points:    Take pain medications as directed, before pain becomes severe.    Pain medication can upset your stomach: taking it with food may help.    Constipation is a common side effect of pain medication. Drink plenty of  fluids.    Eat foods high in fiber. Take a stool softener if recommended by your doctor or pharmacist.    Do not drink alcohol, drive or operate machinery while taking pain medications.    Ask about other ways to control pain, such as with heat, ice or relaxation.    Tylenol/Acetaminophen Consumption  To help encourage the safe use of acetaminophen, the makers of TYLENOL  have lowered the maximum daily dose for single-ingredient Extra Strength TYLENOL  (acetaminophen) products sold in the [place] from 8 pills per day (4,000 mg) to 6 pills per day (3,000 mg). The dosing interval has also changed from 2 pills every 4-6 hours to 2 pills every 6 hours.    If you feel your pain relief is insufficient, you may take Tylenol/Acetaminophen in addition to your narcotic pain medication.     Be careful not to exceed 3,000 mg of Tylenol/Acetaminophen in a 24 hour period from all sources.    If you are taking extra strength Tylenol/acetaminophen (500 mg), the maximum dose is 6 tablets in 24 hours.    If you are taking regular strength acetaminophen (325 mg), the maximum dose is 9 tablets in 24 hours.    Call a doctor for any of the followin. Signs of infection (fever, growing tenderness at the surgery site, a large amount of drainage or bleeding, severe pain, foul-smelling drainage, redness, swelling).  2. It has been over 8 to 10 hours since surgery and you are still not able to urinate (pass water).  3. Headache for over 24 hours.  4. Numbness,  tingling or weakness the day after surgery (if you had spinal anesthesia).  Your doctor is:  Dr. Lam Arora, Plastic Surgery: [phone]                    Or dial [phone] and ask for the resident on call for:  Plastics  For emergency care, call the:  [facility]:  [phone] (TTY for hearing impaired: [phone])

## 2017-11-03 NOTE — OP NOTE
DATE OF PROCEDURE:  11/02/2017      PREOPERATIVE DIAGNOSIS:  Status post bilateral breast reconstruction for bilateral nipple-sparing mastectomy for high risk for breast cancer, now ready for revisional surgeries.      POSTOPERATIVE DIAGNOSIS:  Status post bilateral breast reconstruction for bilateral nipple-sparing mastectomy for high risk for  breast cancer, now ready for revisional surgeries.      PROCEDURES:   1.  Bilateral periareolar mastopexies.   2.  Bilateral upper pole breast fat grafting from abdomen (total of 100 cc of fat grafting done).      SURGEON:  MADISYN Arora MD      FELLOW:  Joe Jaquez MD      ANESTHESIA:  General anesthesia with endotracheal intubation.      COMPLICATIONS:  Nil.      DRAINS:  Nil.      SPECIMENS:  Skin from bilateral breasts.      BLOOD LOSS:  15 mL.      DESCRIPTION OF PROCEDURE:  After informed consent was taken from the patient and the proper site and procedure was ascertained with her and she was appropriately marked, she was taken to the operating room.  She was placed in a supine position with the knees comfortably flexed, pillows underneath them and pneumoboots placed and running prior to induction of anesthesia.  Preoperative antibiotics were given in the OR.  All pressure points were appropriately padded.  General anesthesia was administered without any complications.  She was prepped and draped in a standard surgical fashion.  I began by first instilling tumescent solution including a liter of Ringer lactate mixed with 30 mL of 1% lidocaine and 1 mL of 1:1000 epinephrine in the abdominal region.  While this took effect, I turned my attention to the breasts.  I had already marked her out for bilateral periareolar, skin-only mastopexies.  I went ahead and marked a 42 mm diameter areola on each side and then marked out the preop markings that I had made periareolarly in an oval dimension to take out the excess skin and lift the nipple areolar complexes in a  periareolar fashion.  I then deepithelized the donut of skin on each side and then closed the areolar surgical site with 3-0 nylon suture in an interrupted horizontal mattress fashion circumferentially.  After this was completed, I then turned my attention back to the abdomen.  I performed liposuction using a 4 mm candy cane cannula and suctioned out least 250-300 mL of lipoaspirate in the Resolve system.  I then washed the aspirate with a liter of Ringer lactate in 3 different spins and then harvested the cleaned-out fat in 10 cc syringes.  These were then injected in the upper pole of both breasts using small stab incisions and blunt Terrazas catheters to get the result we wanted.  This was done in a careful technique not to injure the underlying implant.  Once this was completed, the incisions were closed with 5-0 fast absorbing gut suture and Dermabond.  The periareolar incisions were covered with Prineo.  The patient was wrapped with an Ace wrap on the chest and a sponge dressing and an abdominal binder on the abdomen.  The patient tolerated the procedure well.  All counts correct at the end of the case.  The patient was extubated and sent to recovery room in stable condition.         SUZY TORRES MD             D: 2017 15:19   T: 2017 08:13   MT: belinda      Name:     MALCOLM TREJO   MRN:      [id number]        Account:        [id number]   :      1985           Procedure Date: 2017      Document: [id number]

## 2017-11-06 LAB — COPATH REPORT: NORMAL

## 2017-11-07 ENCOUNTER — OFFICE VISIT (OUTPATIENT)
Dept: PLASTIC SURGERY | Facility: CLINIC | Age: 32
End: 2017-11-07
Attending: PLASTIC SURGERY
Payer: COMMERCIAL

## 2017-11-07 VITALS — WEIGHT: 172.4 LBS | BODY MASS INDEX: 27.83 KG/M2

## 2017-11-07 DIAGNOSIS — Z98.890 STATUS POST BILATERAL BREAST RECONSTRUCTION: Primary | ICD-10-CM

## 2017-11-07 PROCEDURE — 99212 OFFICE O/P EST SF 10 MIN: CPT | Mod: ZF

## 2017-11-07 NOTE — PROGRESS NOTES
PRESENTING COMPLAINT:  Postoperative visit, status post bilateral periareolar mastopexies and upper pole of breast fat grafting from abdomen done on 11/02/2017.      HISTORY OF PRESENT ILLNESS:  Ms. Ospina is 32 years old.  She is under a week out from surgery and has done extremely well, very happy with the results, no major issues.      PHYSICAL EXAMINATION:  On exam vital signs stable.  She is afebrile in no obvious distress.  Both breasts are healing in well, some bruising, minimal swelling.  Nipples are pink.  Abdomen is also healing in well.      ASSESSMENT AND PLAN:  Based on above findings, a diagnosis of bilateral breast reconstruction for breast cancer with last surgery being periareolar mastopexy and upper pole fat grafting of the breast from the abdomen was made.  I have asked the patient to start moisturizing the chest and the tape and the glue to fall off over the next couple of weeks.  I will see her back in 2 weeks to remove the sutures on the areolas.  All questions were answered.  She was happy with the visit.  All exam done in the presence of my nurse.

## 2017-11-07 NOTE — LETTER
11/7/2017       RE: Tessa Ospina  [address]  [place] [address]     Dear Colleague,    Thank you for referring your patient, Tessa Ospina, to the [facility] at [facility]. Please see a copy of my visit note below.    PRESENTING COMPLAINT:  Postoperative visit, status post bilateral periareolar mastopexies and upper pole of breast fat grafting from abdomen done on 11/02/2017.      HISTORY OF PRESENT ILLNESS:  Ms. Ospina is 32 years old.  She is under a week out from surgery and has done extremely well, very happy with the results, no major issues.      PHYSICAL EXAMINATION:  On exam vital signs stable.  She is afebrile in no obvious distress.  Both breasts are healing in well, some bruising, minimal swelling.  Nipples are pink.  Abdomen is also healing in well.      ASSESSMENT AND PLAN:  Based on above findings, a diagnosis of bilateral breast reconstruction for breast cancer with last surgery being periareolar mastopexy and upper pole fat grafting of the breast from the abdomen was made.  I have asked the patient to start moisturizing the chest and the tape and the glue to fall off over the next couple of weeks.  I will see her back in 2 weeks to remove the sutures on the areolas.  All questions were answered.  She was happy with the visit.  All exam done in the presence of my nurse.       Again, thank you for allowing me to participate in the care of your patient.      Sincerely,    SUZY Arora MD

## 2017-11-07 NOTE — NURSING NOTE
"Oncology Rooming Note    November 7, 2017 8:14 AM   Tessa Ospina is a 32 year old female who presents for:    Chief Complaint   Patient presents with     Oncology Clinic Visit     Post Op     Initial Vitals: Wt 78.2 kg (172 lb 6.4 oz)  LMP 10/26/2017  BMI 27.83 kg/m2 Estimated body mass index is 27.83 kg/(m^2) as calculated from the following:    Height as of 11/2/17: 1.676 m (5' 6\").    Weight as of this encounter: 78.2 kg (172 lb 6.4 oz). Body surface area is 1.91 meters squared.  Data Unavailable Comment: Data Unavailable   Patient's last menstrual period was 10/26/2017.  Allergies reviewed: Yes  Medications reviewed: Yes    Medications: Medication refills not needed today.  Pharmacy name entered into [facility]:    [facility] [address] [facility] [place], [place] - [address]  [facility] [id number] [place] [address]    Clinical concerns: Vitals not done, provider ready to see patient.     6 minutes for nursing intake (face to face time)     Berkley Odom LPN            "

## 2017-11-07 NOTE — MR AVS SNAPSHOT
After Visit Summary   11/7/2017    Tessa Ospina    MRN: [id number]           Patient Information     Date Of Birth          1985        Visit Information        Provider Department      11/7/2017 8:30 AM SUZY Arora MD [facility]        Today's Diagnoses     Status post bilateral breast reconstruction    -  1       Follow-ups after your visit        Follow-up notes from your care team     Return in about 2 weeks (around 11/21/2017).      Your next 10 appointments already scheduled     Nov 21, 2017  8:30 AM CST   (Arrive by 8:15 AM)   Post-Op with SUZY Arora MD   [facility] ([facility])    [address]  2nd Floor  [facility] [id number] [phone]            Jun 07, 2018  8:00 AM CDT   Return Visit with MESHA Conrad CNS   Cancer Risk Management Program ([facility])    [facility]  [address] [facility] [address]  [facility] [id number] [phone]              Who to contact     If you have questions or need follow up information about today's clinic visit or your schedule please contact [facility] directly at [phone].  Normal or non-critical lab and imaging results will be communicated to you by Mod[employer], letter or phone within 4 business days after the clinic has received the results. If you do not hear from us within 7 days, please contact the clinic through [employer] or phone. If you have a critical or abnormal lab result, we will notify you by phone as soon as possible.  Submit refill requests through [email or web address] or call your pharmacy and they will forward the refill request to us. Please allow 3 business days for your refill to be completed.          Additional Information About Your Visit        Mod[employer] Information     [email or web address] gives you secure access to your electronic health record. If you see a primary care provider, you can also send messages to  your care team and make appointments. If you have questions, please call your primary care clinic.  If you do not have a primary care provider, please call [phone] and they will assist you.        Care EveryWhere ID     This is your Care EveryWhere ID. This could be used by other organizations to access your [facility] medical records  [id number]        Your Vitals Were     Last Period BMI (Body Mass Index)                10/26/2017 27.83 kg/m2           Blood Pressure from Last 3 Encounters:   11/02/17 108/68   09/19/17 111/65   08/29/17 98/69    Weight from Last 3 Encounters:   11/07/17 172 lb 6.4 oz   11/02/17 170 lb   10/25/17 172 lb              Today, you had the following     No orders found for display       Primary Care Provider Office Phone # Fax #    Shameka Tovar [phone]        [address]  [place] [address]        Equal Access to Services     [facility]: Hadii aad ku hadasho Soomaali, waaxda luqadaha, qaybta kaalmada adeegyada, waxay idiin hayaan adeeg kharash la'aan . So [facility] [place] [facility] [phone].    ATENCIÓN: Si habla español, tiene a cordero disposición servicios gratuitos de asistencia lingüística. Llame al [phone].    We comply with applicable federal civil rights laws and [place] laws. We do not discriminate on the basis of race, color, national origin, age, disability, sex, sexual orientation, or gender identity.            Thank you!     Thank you for choosing [facility]  for your care. Our goal is always to provide you with excellent care. Hearing back from our patients is one way we can continue to improve our services. Please take a few minutes to complete the written survey that you may receive in the mail after your visit with us. Thank you!             Your Updated Medication List - Protect others around you: Learn how to safely use, store and throw away your medicines at www.disposemymeds.org.          This list is accurate as of:  11/7/17  2:13 PM.  Always use your most recent med list.                   Brand Name Dispense Instructions for use Diagnosis    ondansetron 4 MG ODT tab    ZOFRAN-ODT    8 tablet    Take 1 tablet (4 mg) by mouth every 6 hours as needed for nausea    S/P breast reconstruction, bilateral       senna-docusate 8.6-50 MG per tablet    SENOKOT-S;PERICOLACE    30 tablet    Take 1-2 tablets by mouth 2 times daily    S/P breast reconstruction, bilateral       traMADol 50 MG tablet    ULTRAM    30 tablet    Take 1-2 tablets ( mg) by mouth every 6 hours as needed for pain maximum 8 tablet(s) per day    S/P breast reconstruction, bilateral

## 2017-11-21 ENCOUNTER — OFFICE VISIT (OUTPATIENT)
Dept: PLASTIC SURGERY | Facility: CLINIC | Age: 32
End: 2017-11-21
Attending: PLASTIC SURGERY
Payer: COMMERCIAL

## 2017-11-21 VITALS
WEIGHT: 171.1 LBS | BODY MASS INDEX: 27.5 KG/M2 | TEMPERATURE: 96.5 F | HEIGHT: 66 IN | RESPIRATION RATE: 16 BRPM | HEART RATE: 85 BPM | SYSTOLIC BLOOD PRESSURE: 106 MMHG | DIASTOLIC BLOOD PRESSURE: 62 MMHG | OXYGEN SATURATION: 99 %

## 2017-11-21 DIAGNOSIS — Z98.890 STATUS POST BILATERAL BREAST RECONSTRUCTION: Primary | ICD-10-CM

## 2017-11-21 PROCEDURE — 99212 OFFICE O/P EST SF 10 MIN: CPT | Mod: ZF

## 2017-11-21 NOTE — PROGRESS NOTES
PRESENTING COMPLAINT:  Postoperative visit, status post bilateral periareolar mastopexies and upper pole of breast fat grafting from abdomen done on 11/02/2017.       HISTORY OF PRESENT ILLNESS:  Ms. Ospina is 32 years old.  She is about 3 weeks out from surgery and has done extremely well, very happy with the results, no major issues.       PHYSICAL EXAMINATION:  On exam vital signs stable.  She is afebrile in no obvious distress.  Both breasts are healing in well, some bruising, minimal swelling.  Nipples are pink.  Abdomen is also healing in well.       ASSESSMENT AND PLAN:  Based on above findings, a diagnosis of bilateral breast reconstruction for breast cancer with last surgery being periareolar mastopexy and upper pole fat grafting of the breast from the abdomen was made. Sutures removed.  I have asked the patient to continue moisturizing the chest.  I will see her back in 1 year to follow the implants.  Pictures taken.  All questions were answered.  She was happy with the visit.  All exam done in the presence of my nurse.

## 2017-11-21 NOTE — LETTER
11/21/2017       RE: Tessa Ospina  [address]  [place] [address]     Dear Colleague,    Thank you for referring your patient, Tessa Ospina, to the [facility] at [facility]. Please see a copy of my visit note below.    PRESENTING COMPLAINT:  Postoperative visit, status post bilateral periareolar mastopexies and upper pole of breast fat grafting from abdomen done on 11/02/2017.       HISTORY OF PRESENT ILLNESS:  Ms. Ospina is 32 years old.  She is about 3 weeks out from surgery and has done extremely well, very happy with the results, no major issues.       PHYSICAL EXAMINATION:  On exam vital signs stable.  She is afebrile in no obvious distress.  Both breasts are healing in well, some bruising, minimal swelling.  Nipples are pink.  Abdomen is also healing in well.       ASSESSMENT AND PLAN:  Based on above findings, a diagnosis of bilateral breast reconstruction for breast cancer with last surgery being periareolar mastopexy and upper pole fat grafting of the breast from the abdomen was made. Sutures removed.  I have asked the patient to continue moisturizing the chest.  I will see her back in 1 year to follow the implants.  Pictures taken.  All questions were answered.  She was happy with the visit.  All exam done in the presence of my nurse.     Again, thank you for allowing me to participate in the care of your patient.      Sincerely,    SUZY Arora MD

## 2017-11-21 NOTE — NURSING NOTE
"Oncology Rooming Note    November 21, 2017 8:32 AM   Tessa Ospina is a 32 year old female who presents for:    Chief Complaint   Patient presents with     Oncology Clinic Visit     Patient with Status post bilateral breast reconstruction here for provider visit      Initial Vitals: /62 (BP Location: Right arm, Patient Position: Chair, Cuff Size: Adult Regular)  Pulse 85  Temp 96.5  F (35.8  C) (Oral)  Resp 16  Ht 1.676 m (5' 5.98\")  Wt 77.6 kg (171 lb 1.6 oz)  LMP 11/20/2017  SpO2 99%  BMI 27.63 kg/m2 Estimated body mass index is 27.63 kg/(m^2) as calculated from the following:    Height as of this encounter: 1.676 m (5' 5.98\").    Weight as of this encounter: 77.6 kg (171 lb 1.6 oz). Body surface area is 1.9 meters squared.  Data Unavailable Comment: Data Unavailable   Patient's last menstrual period was 11/20/2017.  Allergies reviewed: Yes  Medications reviewed: Yes    Medications: Medication refills not needed today.  Pharmacy name entered into [facility]:    [facility] [place], [place] - [address] [place] [address] [facility] [place], [place] - [address]  [facility] [id number] [place] [address]    Clinical concerns:     5 minutes for nursing intake (face to face time)     Rachel Norton CMA              "

## 2017-11-21 NOTE — MR AVS SNAPSHOT
After Visit Summary   11/21/2017    Tessa Ospina    MRN: [id number]           Patient Information     Date Of Birth          1985        Visit Information        Provider Department      11/21/2017 8:30 AM SUZY Arora MD [facility]        Today's Diagnoses     Status post bilateral breast reconstruction    -  1       Follow-ups after your visit        Follow-up notes from your care team     Return if symptoms worsen or fail to improve.      Your next 10 appointments already scheduled     Jun 07, 2018  8:00 AM CDT   Return Visit with MESHA Conrad CNS   Cancer Risk Management Program ([facility])    [facility]  [address]  [facility] [address] [phone]              Who to contact     If you have questions or need follow up information about today's clinic visit or your schedule please contact [facility] directly at [phone].  Normal or non-critical lab and imaging results will be communicated to you by [employer] Enh[employer]art, letter or phone within 4 business days after the clinic has received the results. If you do not hear from us within 7 days, please contact the clinic through [employer] or phone. If you have a critical or abnormal lab result, we will notify you by phone as soon as possible.  Submit refill requests through [employer]ot[employer] or call your pharmacy and they will forward the refill request to us. Please allow 3 business days for your refill to be completed.          Additional Information About Your Visit        MyChart Information     [employer] gives you secure access to your electronic health record. If you see a primary care provider, you can also send messages to your care team and make appointments. If you have questions, please call your primary care clinic.  If you do not have a primary care provider, please call [phone] and they will assist you.        Care EveryWhere ID     This is  "your Care EveryWhere ID. This could be used by other organizations to access your [place] medical records  [id number]        Your Vitals Were     Pulse Temperature Respirations Height Last Period Pulse Oximetry    85 96.5  F (35.8  C) (Oral) 16 5' 5.98\" 11/20/2017 99%    BMI (Body Mass Index)                   27.63 kg/m2            Blood Pressure from Last 3 Encounters:   11/21/17 106/62   11/02/17 108/68   09/19/17 111/65    Weight from Last 3 Encounters:   11/21/17 171 lb 1.6 oz   11/07/17 172 lb 6.4 oz   11/02/17 170 lb              Today, you had the following     No orders found for display       Primary Care Provider Office Phone # Fax #    Shameka Tovar [phone]       [address] [place] [address]        Equal Access to Services     [facility]: Hadii aad ku hadasho Soyanniali, waaxda luqadaha, qaybta kaalmada adeegyada, waxay sujatain haygenevan jg lopez . So [facility] [phone].    ATENCIÓN: Si habla español, tiene a cordero disposición servicios gratuitos de asistencia lingüística. Jasvir al [phone].    We comply with applicable federal civil rights laws and [place] laws. We do not discriminate on the basis of race, color, national origin, age, disability, sex, sexual orientation, or gender identity.            Thank you!     Thank you for choosing [facility]  for your care. Our goal is always to provide you with excellent care. Hearing back from our patients is one way we can continue to improve our services. Please take a few minutes to complete the written survey that you may receive in the mail after your visit with us. Thank you!             Your Updated Medication List - Protect others around you: Learn how to safely use, store and throw away your medicines at www.disposemymeds.org.      Notice  As of 11/21/2017  4:17 PM    You have not been prescribed any medications.      "

## 2018-01-16 DIAGNOSIS — Z15.01 BRCA2 POSITIVE: Primary | ICD-10-CM

## 2018-01-16 DIAGNOSIS — Z91.89 AT RISK FOR CANCER: ICD-10-CM

## 2018-01-16 DIAGNOSIS — Z15.09 BRCA2 POSITIVE: Primary | ICD-10-CM

## 2018-01-25 ENCOUNTER — HOSPITAL ENCOUNTER (OUTPATIENT)
Dept: ULTRASOUND IMAGING | Facility: CLINIC | Age: 33
Discharge: HOME | End: 2018-01-25
Attending: CLINICAL NURSE SPECIALIST | Admitting: CLINICAL NURSE SPECIALIST
Payer: COMMERCIAL

## 2018-01-25 ENCOUNTER — ONCOLOGY VISIT (OUTPATIENT)
Dept: ONCOLOGY | Facility: CLINIC | Age: 33
End: 2018-01-25
Attending: CLINICAL NURSE SPECIALIST
Payer: COMMERCIAL

## 2018-01-25 ENCOUNTER — HOSPITAL ENCOUNTER (OUTPATIENT)
Facility: CLINIC | Age: 33
Setting detail: SPECIMEN
Discharge: HOME | End: 2018-01-25
Attending: CLINICAL NURSE SPECIALIST | Admitting: CLINICAL NURSE SPECIALIST
Payer: COMMERCIAL

## 2018-01-25 DIAGNOSIS — Z15.01 BRCA2 POSITIVE: ICD-10-CM

## 2018-01-25 DIAGNOSIS — Z15.09 BRCA2 POSITIVE: ICD-10-CM

## 2018-01-25 DIAGNOSIS — Z91.89 AT RISK FOR CANCER: ICD-10-CM

## 2018-01-25 LAB — CANCER AG125 SERPL-ACNC: 11 U/ML (ref 0–30)

## 2018-01-25 PROCEDURE — 86304 IMMUNOASSAY TUMOR CA 125: CPT | Performed by: CLINICAL NURSE SPECIALIST

## 2018-01-25 PROCEDURE — 76856 US EXAM PELVIC COMPLETE: CPT

## 2018-01-25 PROCEDURE — 36415 COLL VENOUS BLD VENIPUNCTURE: CPT

## 2018-01-25 NOTE — PROGRESS NOTES
Medical Assistant Note:  Tessa Ospina presents today for labs.    Patient seen by provider today:no.   present during visit today: Not Applicable.    Concerns: No Concerns.    Procedure:  Lab draw site: LAC, Needle type: BF, Gauge: 23.    Post Assessment:  Labs drawn without difficulty: Yes.    Discharge Plan:  Pt tolerated procedure well. Gauze and coban applied.    Face to Face Time: 10min.    Tina Tafoya MA

## 2018-01-25 NOTE — MR AVS SNAPSHOT
After Visit Summary   1/25/2018    Tessa Ospina    MRN: [id number]           Patient Information     Date Of Birth          1985        Visit Information        Provider Department      1/25/2018 2:00 PM Nurse,  Oncology [facility]        Today's Diagnoses     BRCA2 positive        At risk for cancer           Follow-ups after your visit        Your next 10 appointments already scheduled     Jan 25, 2018  2:00 PM CST   Return Visit with  Oncology Nurse   [facility] ([facility])    [facility]  [address]  [place] [address]   [phone]            Jun 07, 2018  8:00 AM CDT   Return Visit with MESHA Conrad CNS   Cancer Risk Management Program ([facility])    [facility]  [address]  [place] [address]   [phone]              Future tests that were ordered for you today     Open Future Orders        Priority Expected Expires Ordered    US Pelvic Complete with Transvaginal Routine  1/16/2019 1/16/2018            Who to contact     If you have questions or need follow up information about today's clinic visit or your schedule please contact [facility] directly at [phone].  Normal or non-critical lab and imaging results will be communicated to you by [employer], letter or phone within 4 business days after the clinic has received the results. If you do not hear from us within 7 days, please contact the clinic through [employer] or phone. If you have a critical or abnormal lab result, we will notify you by phone as soon as possible.  Submit refill requests through [employer] or call your pharmacy and they will forward the refill request to us. Please allow 3 business days for your refill to be completed.          Additional Information About Your Visit        [employer] Information     [employer] gives you secure access to your electronic  health record. If you see a primary care provider, you can also send messages to your care team and make appointments. If you have questions, please call your primary care clinic.  If you do not have a primary care provider, please call [phone] and they will assist you.        Care EveryWhere ID     This is your Care EveryWhere ID. This could be used by other organizations to access your [place] medical records  [id number]         Blood Pressure from Last 3 Encounters:   11/21/17 106/62   11/02/17 108/68   09/19/17 111/65    Weight from Last 3 Encounters:   11/21/17 77.6 kg (171 lb 1.6 oz)   11/07/17 78.2 kg (172 lb 6.4 oz)   11/02/17 77.1 kg (170 lb)              We Performed the Following             Primary Care Provider Office Phone # Fax #    Shameka Tovar [phone]       [address]  [place] [address]        Equal Access to Services     [facility]: Hadii aad ku hadasho Soomaali, waaxda luqadaha, qaybta kaalmada adeegyada, waxay idiin haygenevan jg khrachel lopez . So [facility] [phone].    ATENCIÓN: Si habla español, tiene a cordero disposición servicios gratuitos de asistencia lingüística. Llame al [phone].    We comply with applicable federal civil rights laws and [place] laws. We do not discriminate on the basis of race, color, national origin, age, disability, sex, sexual orientation, or gender identity.            Thank you!     Thank you for choosing [facility]  for your care. Our goal is always to provide you with excellent care. Hearing back from our patients is one way we can continue to improve our services. Please take a few minutes to complete the written survey that you may receive in the mail after your visit with us. Thank you!             Your Updated Medication List - Protect others around you: Learn how to safely use, store and throw away your medicines at www.disposemymeds.org.      Notice  As of 1/25/2018  1:52 PM    You  have not been prescribed any medications.

## 2018-01-25 NOTE — LETTER
1/25/2018         RE: Tessa Ospina  [address]  [place] [address]        Dear Colleague,    Thank you for referring your patient, Tessa Ospina, to the [facility]. Please see a copy of my visit note below.    Medical Assistant Note:  Tessa Ospina presents today for labs.    Patient seen by provider today:no.   present during visit today: Not Applicable.    Concerns: No Concerns.    Procedure:  Lab draw site: LAC, Needle type: BF, Gauge: 23.    Post Assessment:  Labs drawn without difficulty: Yes.    Discharge Plan:  Pt tolerated procedure well. Gauze and coban applied.    Face to Face Time: 10min.    Tina Tafoya MA        Sincerely,        Oncology Nurse

## 2018-02-07 DIAGNOSIS — N83.201 CYST OF RIGHT OVARY: Primary | ICD-10-CM

## 2018-02-12 ENCOUNTER — OFFICE VISIT (OUTPATIENT)
Dept: OBGYN | Facility: CLINIC | Age: 33
End: 2018-02-12
Attending: OBSTETRICS & GYNECOLOGY
Payer: COMMERCIAL

## 2018-02-12 VITALS
SYSTOLIC BLOOD PRESSURE: 106 MMHG | WEIGHT: 173 LBS | HEART RATE: 87 BPM | HEIGHT: 66 IN | DIASTOLIC BLOOD PRESSURE: 68 MMHG | BODY MASS INDEX: 27.8 KG/M2

## 2018-02-12 DIAGNOSIS — N83.201 CYST OF RIGHT OVARY: Primary | ICD-10-CM

## 2018-02-12 PROCEDURE — G0463 HOSPITAL OUTPT CLINIC VISIT: HCPCS | Mod: ZF

## 2018-02-12 ASSESSMENT — ANXIETY QUESTIONNAIRES
1. FEELING NERVOUS, ANXIOUS, OR ON EDGE: NOT AT ALL
GAD7 TOTAL SCORE: 0
3. WORRYING TOO MUCH ABOUT DIFFERENT THINGS: NOT AT ALL
7. FEELING AFRAID AS IF SOMETHING AWFUL MIGHT HAPPEN: NOT AT ALL
4. TROUBLE RELAXING: NOT AT ALL
2. NOT BEING ABLE TO STOP OR CONTROL WORRYING: NOT AT ALL
7. FEELING AFRAID AS IF SOMETHING AWFUL MIGHT HAPPEN: NOT AT ALL
5. BEING SO RESTLESS THAT IT IS HARD TO SIT STILL: NOT AT ALL
GAD7 TOTAL SCORE: 0
6. BECOMING EASILY ANNOYED OR IRRITABLE: NOT AT ALL

## 2018-02-12 ASSESSMENT — PAIN SCALES - GENERAL: PAINLEVEL: NO PAIN (0)

## 2018-02-12 NOTE — NURSING NOTE
Chief Complaint   Patient presents with     Establish Care     DX right ovarian cyst   Robina Molina LPN

## 2018-02-12 NOTE — LETTER
2018       RE: Tessa Ospina  [address]  [place] [address]     Dear Colleague,    Thank you for referring your patient, Tessa Ospina, to the [facility] at [facility]. Please see a copy of my visit note below.    [facility]    HPI: Tessa Ospina is a 32 year old  who is BRCA2 positive and presents to follow up for right ovarian cysts noted on ultrasound. She has chosen q6 month TVUS and CA-125 as screening for ovarian cancer. She was seen previously in 2016 by Dr. Can of gyn oncology, at which time she was counseled on risks of ovarian cancer and the option of a risk reducing bilateral salpingoophorectomy. She has chosen to delay a prophylactic BSO, as she feels like this is still quite early for menopause. She plans to reconsider around age 34, and is aware of recommendations for BSO by age 40.     On her most recent pelvic US , she was noted to have two cysts in the right ovary, the largest measuring 2.5 x 1.7 x 2.2 cm. The left ovary was normal. The endometrium was 15 mm. Of note, the ultrasound was about one week before her period.    She reports regular menses, about every 30 days with 5 days of bleeding. She denies any recent change in cycles. She denies bloating, abdominal pain, weight loss or other complaints. She started a new exercise program  and has been exercising 4-5 days per week.     OB history:   x2, full term. Second pregnancy complicated by gestational diabetes    Gyn history:  No history of abnormal paps. Last pap NILM 2016 with negative HPV. No history of surgery on uterus or ovaries.     Past Medical History:   Diagnosis Date     BRCA2 positive      PONV (postoperative nausea and vomiting)     Unsure if this was due to anesthesia and/or vicodin.     Past Surgical History:   Procedure Laterality Date     BREAST BIOPSY, RT/LT Right 2015    benign      SECTION  ,  "2013         COSMETIC SURGERY  2016, 2016    Prophylactic bilateral mastectomy and reconstruction     GRAFT FAT TO BREAST Bilateral 2017    Procedure: GRAFT FAT TO BREAST;  Bilateral Breast mastopexy and Fat Grafting from Abdomen;  Surgeon: SUZY Arora MD;  Location:  OR     GYN SURGERY  2013     MASTECTOMY, NIPPLE SPARING Bilateral 6/15/2016    Procedure: MASTECTOMY, NIPPLE SPARING;  Surgeon: Nely Mcqueen MD;  [facility]     RECONSTRUCT BREAST BILATERAL Bilateral 6/15/2016    Procedure: RECONSTRUCT BREAST BILATERAL;  Surgeon: SUZY Arora MD;  Location:  OR     RECONSTRUCT BREAST SECOND STAGE BILATERAL N/A 9/15/2016    Procedure: RECONSTRUCT BREAST SECOND STAGE BILATERAL;  Surgeon: SUZY Arora MD;  Location:  OR     TONSILLECTOMY & ADENOIDECTOMY       WISDOM ST GUIDEWIRE       No current outpatient prescriptions on file.       Allergies   Allergen Reactions     Percocet [Oxycodone-Acetaminophen] Nausea and Vomiting     Vicodin [Hydrocodone-Acetaminophen] Nausea and Vomiting     Sensitivity to narcotics.       Answers for HPI/ROS submitted by the patient on 2018   BRITT 7 TOTAL SCORE: 0  PHQ-2 Score: 0  General Symptoms: No  Skin Symptoms: No  HENT Symptoms: No  EYE SYMPTOMS: No  HEART SYMPTOMS: No  LUNG SYMPTOMS: No  INTESTINAL SYMPTOMS: No  URINARY SYMPTOMS: No  GYNECOLOGIC SYMPTOMS: No  BREAST SYMPTOMS: No  SKELETAL SYMPTOMS: No  BLOOD SYMPTOMS: No  NERVOUS SYSTEM SYMPTOMS: No  MENTAL HEALTH SYMPTOMS: No    Exam:  Vitals:    18 1347   BP: 106/68   Pulse: 87   Weight: 78.5 kg (173 lb)   Height: 1.676 m (5' 5.98\")     Gen: alert, oriented, NAD  Abd: soft, non-distended  : normal external genitalia. Cervix smooth. Uterus mobile, anteverted, and normal sized. No adnexal masses. No tenderness.      (): 11    A/P:  32 year old  with BRCA2 mutation presenting to follow up on right ovarian cysts seen on recent " screening ultrasound. Reviewed ultrasound images, which are most consistent with follicular or hemorrhagic cysts and do not have the appearance of malignancy. Patient is asymptomatic and exam is unremarkable.     - Reviewed and discussed ultrasound with Tessa. Recommend follow up in 6 weeks to ensure resolution of cysts, given her history.   - Discussed option for risk reducing BSO, early menopause and options used to mitigate effects or early menopause and treat menopause symptoms. Discussed that risk reducing BSO is an option if at any point she decides she is ready. Reviewed the limitations of US and , as there is no good ovarian cancer screening.     Again, thank you for allowing me to participate in the care of your patient.      Sincerely,    Arlen Salinas MD

## 2018-02-12 NOTE — MR AVS SNAPSHOT
"              After Visit Summary   2/12/2018    Tessa Ospina    MRN: [id number]           Patient Information     Date Of Birth          1985        Visit Information        Provider Department      2/12/2018 1:30 PM Arlen Salinas MD [facility]        Today's Diagnoses     Cyst of right ovary    -  1       Follow-ups after your visit        Your next 10 appointments already scheduled     Jun 07, 2018  8:00 AM CDT   Return Visit with MESHA Conrad CNS   Cancer Risk Management Program ([facility])    [facility]  [address]  [place] [id number] [phone]              Who to contact     Please call your clinic at [phone] to:    Ask questions about your health    Make or cancel appointments    Discuss your medicines    Learn about your test results    Speak to your doctor            Additional Information About Your Visit        MyChart Information     [employer] gives you secure access to your electronic health record. If you see a primary care provider, you can also send messages to your care team and make appointments. If you have questions, please call your primary care clinic.  If you do not have a primary care provider, please call [phone] and they will assist you.      [employer] is an electronic gateway that provides easy, online access to your medical records. With [employer], you can request a clinic appointment, read your test results, renew a prescription or communicate with your care team.     To access your existing account, please contact your [facility] or call [phone] for assistance.        Care EveryWhere ID     This is your Care EveryWhere ID. This could be used by other organizations to access your [place] medical records  [id number]        Your Vitals Were     Pulse Height Last Period Breastfeeding? BMI (Body Mass Index)       87 1.676 m (5' 5.98\") " 02/06/2018 No 27.94 kg/m2        Blood Pressure from Last 3 Encounters:   02/12/18 106/68   11/21/17 106/62   11/02/17 108/68    Weight from Last 3 Encounters:   02/12/18 78.5 kg (173 lb)   11/21/17 77.6 kg (171 lb 1.6 oz)   11/07/17 78.2 kg (172 lb 6.4 oz)              Today, you had the following     No orders found for display       Primary Care Provider Office Phone # Fax #    Shameka Tovar [phone]       [address]  [place] [address]        Equal Access to Services     [facility]: Hadii torsten dukes Soaida, wadeannda ban, qaybta kaalmada adekwan, jes lopez . So [facility] [phone].    ATENCIÓN: Si habla español, tiene a cordero disposición servicios gratuitos de asistencia lingüística. Llame al [phone].    We comply with applicable federal civil rights laws and [place] laws. We do not discriminate on the basis of race, color, national origin, age, disability, sex, sexual orientation, or gender identity.            Thank you!     Thank you for choosing [facility]  for your care. Our goal is always to provide you with excellent care. Hearing back from our patients is one way we can continue to improve our services. Please take a few minutes to complete the written survey that you may receive in the mail after your visit with us. Thank you!             Your Updated Medication List - Protect others around you: Learn how to safely use, store and throw away your medicines at www.disposemymeds.org.      Notice  As of 2/12/2018  4:47 PM    You have not been prescribed any medications.

## 2018-02-12 NOTE — PROGRESS NOTES
[facility]    HPI: Tessa Ospina is a 32 year old  who is BRCA2 positive and presents to follow up for right ovarian cysts noted on ultrasound. She has chosen q6 month TVUS and CA-125 as screening for ovarian cancer. She was seen previously in 2016 by Dr. Can of gyn oncology, at which time she was counseled on risks of ovarian cancer and the option of a risk reducing bilateral salpingoophorectomy. She has chosen to delay a prophylactic BSO, as she feels like this is still quite early for menopause. She plans to reconsider around age 34, and is aware of recommendations for BSO by age 40.     On her most recent pelvic US , she was noted to have two cysts in the right ovary, the largest measuring 2.5 x 1.7 x 2.2 cm. The left ovary was normal. The endometrium was 15 mm. Of note, the ultrasound was about one week before her period.    She reports regular menses, about every 30 days with 5 days of bleeding. She denies any recent change in cycles. She denies bloating, abdominal pain, weight loss or other complaints. She started a new exercise program  and has been exercising 4-5 days per week.     OB history:   x2, full term. Second pregnancy complicated by gestational diabetes    Gyn history:  No history of abnormal paps. Last pap NILM 2016 with negative HPV. No history of surgery on uterus or ovaries.     Past Medical History:   Diagnosis Date     BRCA2 positive      PONV (postoperative nausea and vomiting)     Unsure if this was due to anesthesia and/or vicodin.     Past Surgical History:   Procedure Laterality Date     BREAST BIOPSY, RT/LT Right 2015    benign      SECTION  ,          COSMETIC SURGERY  2016, 2016    Prophylactic bilateral mastectomy and reconstruction     GRAFT FAT TO BREAST Bilateral 2017    Procedure: GRAFT FAT TO BREAST;  Bilateral Breast mastopexy and Fat Grafting from Abdomen;  Surgeon: SUZY Arora MD;   "[facility]     GYN SURGERY  11/24/2013     MASTECTOMY, NIPPLE SPARING Bilateral 6/15/2016    Procedure: MASTECTOMY, NIPPLE SPARING;  Surgeon: Nely Mcqueen MD;  [facility]     RECONSTRUCT BREAST BILATERAL Bilateral 6/15/2016    Procedure: RECONSTRUCT BREAST BILATERAL;  Surgeon: SUZY Arora MD;  [facility]     RECONSTRUCT BREAST SECOND STAGE BILATERAL N/A 9/15/2016    Procedure: RECONSTRUCT BREAST SECOND STAGE BILATERAL;  Surgeon: SUZY Arora MD;  [facility]     TONSILLECTOMY & ADENOIDECTOMY  2007     WISDOM ST GUIDEWIRE       No current outpatient prescriptions on file.       Allergies   Allergen Reactions     Percocet [Oxycodone-Acetaminophen] Nausea and Vomiting     Vicodin [Hydrocodone-Acetaminophen] Nausea and Vomiting     Sensitivity to narcotics.       Answers for HPI/ROS submitted by the patient on 2/12/2018   BRITT 7 TOTAL SCORE: 0  PHQ-2 Score: 0  General Symptoms: No  Skin Symptoms: No  HENT Symptoms: No  EYE SYMPTOMS: No  HEART SYMPTOMS: No  LUNG SYMPTOMS: No  INTESTINAL SYMPTOMS: No  URINARY SYMPTOMS: No  GYNECOLOGIC SYMPTOMS: No  BREAST SYMPTOMS: No  SKELETAL SYMPTOMS: No  BLOOD SYMPTOMS: No  NERVOUS SYSTEM SYMPTOMS: No  MENTAL HEALTH SYMPTOMS: No    Exam:  Vitals:    02/12/18 1347   BP: 106/68   Pulse: 87   Weight: 78.5 kg (173 lb)   Height: 1.676 m (5' 5.98\")     Gen: alert, oriented, NAD  Abd: soft, non-distended  : normal external genitalia. Cervix smooth. Uterus mobile, anteverted, and normal sized. No adnexal masses. No tenderness.      (1/25): 11    A/P:  32 year old  with BRCA2 mutation presenting to follow up on right ovarian cysts seen on recent screening ultrasound. Reviewed ultrasound images, which are most consistent with follicular or hemorrhagic cysts and do not have the appearance of malignancy. Patient is asymptomatic and exam is unremarkable.     - Reviewed and discussed ultrasound with Tessa. Recommend follow up in 6 weeks to " ensure resolution of cysts, given her history.   - Discussed option for risk reducing BSO, early menopause and options used to mitigate effects or early menopause and treat menopause symptoms. Discussed that risk reducing BSO is an option if at any point she decides she is ready. Reviewed the limitations of US and , as there is no good ovarian cancer screening.     Arlen Salinas MD

## 2018-02-13 ASSESSMENT — ANXIETY QUESTIONNAIRES: GAD7 TOTAL SCORE: 0

## 2018-02-21 ENCOUNTER — OFFICE VISIT (OUTPATIENT)
Dept: FAMILY MEDICINE | Facility: CLINIC | Age: 33
End: 2018-02-21
Payer: COMMERCIAL

## 2018-02-21 VITALS
DIASTOLIC BLOOD PRESSURE: 80 MMHG | WEIGHT: 170 LBS | SYSTOLIC BLOOD PRESSURE: 111 MMHG | BODY MASS INDEX: 27.46 KG/M2 | HEART RATE: 71 BPM | TEMPERATURE: 97.3 F

## 2018-02-21 DIAGNOSIS — H81.10 BENIGN PAROXYSMAL POSITIONAL VERTIGO, UNSPECIFIED LATERALITY: Primary | ICD-10-CM

## 2018-02-21 PROCEDURE — 99213 OFFICE O/P EST LOW 20 MIN: CPT | Performed by: INTERNAL MEDICINE

## 2018-02-21 NOTE — PROGRESS NOTES
SUBJECTIVE:   Tessa Ospina is a 32 year old female who presents to clinic today for the following health issues:      Dizziness  Onset: Last Thursday     Description:   Do you feel faint:  no   Does it feel like the surroundings (bed, room) are moving: YES  Unsteady/off balance: YES  Have you passed out or fallen: no     Intensity: depends on what shes doing     Progression of Symptoms:  intermittent    Accompanying Signs & Symptoms:  Heart palpitations: no   Nausea, vomiting: YES  Weakness in arms or legs: YES  Fatigue: YES  Vision or speech changes: no   Ringing in ears (Tinnitus): no   Hearing Loss: no     History:   Head trauma/concussion hx: no   Previous similar symptoms: no   Recent bleeding history: no     Precipitating factors:   Worse with activity or head movement: YES  Any new medications (BP?): no   Alcohol/drug abuse/withdrawal: no     Alleviating factors:   Does staying in a fixed position give relief:  YES    Therapies Tried and outcome: benadryl, fell asleep       Tessa is here with a feeling of vertigo.  This started about 6 days ago when she was at the gym, laid back to to the bench press.  Had about 60 seconds of vertigo and nausea.  Since then she has had intermittent vertigo accompanied by nausea and occasional vomiting.  This is mainly when she lays back in bed, looks up or down.  She went to a chiropractor last week who advised her in some neck exercises, but that only made things worse.  She feels mildly nauseous all the time, but worse with the vertigo.    She denies any recent illnesses, fevers, headache, tinnitus, ear pain, focal numbness or weakness.  She has not had similar symptoms before.      Reviewed and updated as needed this visit by clinical staff  Tobacco  Allergies  Meds       Reviewed and updated as needed this visit by Provider         ROS:  Shanique, HENT, neuro, GI reviewed,  otherwise negative unless noted above.       OBJECTIVE:     /80 (BP Location: Left arm,  Patient Position: Chair, Cuff Size: Adult Regular)  Pulse 71  Temp 97.3  F (36.3  C) (Tympanic)  Wt 170 lb (77.1 kg)  LMP 02/06/2018  BMI 27.46 kg/m2  Body mass index is 27.46 kg/(m^2).    Gen: healthy appearing, pleasant young woman, no distress   Neuro: when laid back flat quickly, + nystagmus and eliciting of symptoms.         ASSESSMENT/PLAN:       1. Benign paroxysmal positional vertigo, unspecified laterality  Typical 15-60 seconds of vertigo with position change.  Advised on epley maneuver, try dramamine as needed, referred for vestibular PT.   - PHYSICAL THERAPY REFERRAL    F/U as needed for persistent or worsening symptoms.       Roxana Loya MD  [facility]

## 2018-02-21 NOTE — NURSING NOTE
"Chief Complaint   Patient presents with     Dizziness       Initial /80 (BP Location: Left arm, Patient Position: Chair, Cuff Size: Adult Regular)  Pulse 71  Temp 97.3  F (36.3  C) (Tympanic)  Wt 170 lb (77.1 kg)  LMP 02/06/2018  BMI 27.46 kg/m2 Estimated body mass index is 27.46 kg/(m^2) as calculated from the following:    Height as of 2/12/18: 5' 5.98\" (1.676 m).    Weight as of this encounter: 170 lb (77.1 kg).  Medication Reconciliation: complete  "

## 2018-02-21 NOTE — MR AVS SNAPSHOT
"              After Visit Summary   2/21/2018    Tessa Ospina    MRN: [id number]           Patient Information     Date Of Birth          1985        Visit Information        Provider Department      2/21/2018 9:40 AM Roxana Loya MD [facility] [place]        Today's Diagnoses     Benign paroxysmal positional vertigo, unspecified laterality    -  1      Care Instructions    Can try meclizine, dramamine over the counter          Follow-ups after your visit        Additional Services     PHYSICAL THERAPY REFERRAL       *This therapy referral will be filtered to a centralized scheduling office at [facility] and the patient will receive a call to schedule an appointment at a [place] location most convenient for them. *     [facility] provides Physical Therapy evaluation and treatment and many specialty services across the [place] system.  If requesting a specialty program, please choose from the list below.    If you have not heard from the scheduling office within 2 business days, please call [phone] for all locations, with the exception of [place], please call [phone] and [facility], please call [phone]  Treatment: Evaluation & Treatment  Special Instructions/Modalities: none  Special Programs: Balance/Vestibular    Please be aware that coverage of these services is subject to the terms and limitations of your health insurance plan.  Call member services at your health plan with any benefit or coverage questions.      **Note to Provider:  If you are referring outside of [place] for the therapy appointment, please list the name of the location in the \"special instructions\" above, print the referral and give to the patient to schedule the appointment.                  Follow-up notes from your care team     Return if symptoms worsen or fail to improve.      Your next 10 appointments already scheduled     Mar 06, 2018  9:00 AM CST "   US PELVIC COMPLETE W TRANSVAGINAL with SHUS5   [facility] Ultrasound ([facility])    [address] [address] [phone]           Please bring a list of your medicines (including vitamins, minerals and over-the-counter drugs). Also, tell your doctor about any allergies you may have. Wear comfortable clothes and leave your valuables at home.  Adults: Drink six 8-ounce glasses of fluid one hour before your exam. Do NOT empty your bladder.  If you need to empty your bladder before your exam, try to release only a little bit of urine. Then, drink another 8oz glass of fluid.  Children: Children who are potty trained should drink at least 4 cups (32 oz) of liquid 45 minutes to one hour prior to the exam. The child s bladder must be full in order to achieve a diagnostic exam. If your child is very uncomfortable or has an urgent need to pee, please notify a technologist; they will try to find out how much longer the wait may be and provide instructions to help relieve the pressure. Occasionally it is medically necessary to insert a urinary catheter to fill the bladder.  Please call the Imaging Department at your exam site with any questions.            Jun 07, 2018  8:00 AM CDT   Return Visit with MESHA Conrad CNS   Cancer Risk Management Program ([facility])    [facility]  [address] [id number] [phone]              Who to contact     If you have questions or need follow up information about today's clinic visit or your schedule please contact [facility] [place] directly at [phone].  Normal or non-critical lab and imaging results will be communicated to you by MyChart, letter or phone within 4 business days after the clinic has received the results. If you do not hear from us within 7 days, please contact the clinic through MyChart or phone. If you have a critical or  abnormal lab result, we will notify you by phone as soon as possible.  Submit refill requests through [email or web address] or call your pharmacy and they will forward the refill request to us. Please allow 3 business days for your refill to be completed.          Additional Information About Your Visit        [employer] Information     [email or web address] gives you secure access to your electronic health record. If you see a primary care provider, you can also send messages to your care team and make appointments. If you have questions, please call your primary care clinic.  If you do not have a primary care provider, please call [phone] and they will assist you.        Care EveryWhere ID     This is your Care EveryWhere ID. This could be used by other organizations to access your [place] medical records  [id number]        Your Vitals Were     Pulse Temperature Last Period BMI (Body Mass Index)          71 97.3  F (36.3  C) (Tympanic) 02/06/2018 27.46 kg/m2         Blood Pressure from Last 3 Encounters:   02/21/18 111/80   02/12/18 106/68   11/21/17 106/62    Weight from Last 3 Encounters:   02/21/18 170 lb (77.1 kg)   02/12/18 173 lb (78.5 kg)   11/21/17 171 lb 1.6 oz (77.6 kg)              We Performed the Following     PHYSICAL THERAPY REFERRAL        Primary Care Provider Office Phone # Fax #    Shameka Tovar [phone]       [address]  [place] [address]        Equal Access to Services     [facility] : Hadii aad ku hadasho Soomaali, waaxda luqadaha, qaybta kaalmada adeegyada, waxay nancy lopez . So [facility] [phone].    ATENCIÓN: Si habla español, tiene a cordero disposición servicios gratuitos de asistencia lingüística. Llame al [phone].    We comply with applicable federal civil rights laws and [place] laws. We do not discriminate on the basis of race, color, national origin, age, disability, sex, sexual orientation, or gender identity.            Thank you!      Thank you for choosing [facility] [place]  for your care. Our goal is always to provide you with excellent care. Hearing back from our patients is one way we can continue to improve our services. Please take a few minutes to complete the written survey that you may receive in the mail after your visit with us. Thank you!             Your Updated Medication List - Protect others around you: Learn how to safely use, store and throw away your medicines at www.disposemymeds.org.      Notice  As of 2/21/2018 10:00 AM    You have not been prescribed any medications.

## 2018-02-22 ENCOUNTER — HOSPITAL ENCOUNTER (OUTPATIENT)
Dept: PHYSICAL THERAPY | Facility: CLINIC | Age: 33
Setting detail: THERAPIES SERIES
End: 2018-02-22
Attending: INTERNAL MEDICINE
Payer: COMMERCIAL

## 2018-02-22 PROCEDURE — 97161 PT EVAL LOW COMPLEX 20 MIN: CPT | Mod: GP | Performed by: PHYSICAL THERAPIST

## 2018-02-22 PROCEDURE — 95992 CANALITH REPOSITIONING PROC: CPT | Mod: GP | Performed by: PHYSICAL THERAPIST

## 2018-02-22 PROCEDURE — 40000840 ZZHC STATISTIC PT VESTIBULAR VISIT: Performed by: PHYSICAL THERAPIST

## 2018-02-22 NOTE — PROGRESS NOTES
02/22/18 1100   Quick Adds   Type of Visit Initial OP PT Evaluation   General Information   Start of Care Date 02/22/18   Referring Physician Dr. Roxana Jackson   Orders Evaluate and Treat as Indicated   Order Date 02/21/18   Medical Diagnosis BPPV, unspecified laterality   Onset of illness/injury or Date of Surgery 02/15/18   Surgical/Medical history reviewed Yes   Pertinent history of current problem (include personal factors and/or comorbidities that impact the POC) Vertigo began a week ago. Laid back to bench press extreme nausea and vertigo lasted about 60 sec. NOtices it looking up consistently but sometimes looking down.  Went to chiropractor advised her to do neck exs but made the dizziness and vertigo  worse.  Did have a cold 2 weeks ago. No change in hearing or tinnitus. Limited workouts   Pertinent Visual History  Denies changes in vision.    Prior level of function comment Independent with all functional mobility. Works out regularly   Living environment House/townhome   Home/Community Accessibility Comments Lives with  and 2 kids   Patient/Family Goals Statement get rid of vertigo.    Fall Risk Screen   Fall screen completed by PT   Have you fallen 2 or more times in the past year? No   Have you fallen and had an injury in the past year? No   Timed Up and Go score (seconds) 6.22   Is patient a fall risk? No   System Outcome Measures   Dizziness Handicap Inventory (score out of 100) A decrease in score by 17.18 or greater indicates a clinically significant change in symptoms. 66   Pain   Pain comments some neck pain due to stiffness and trying to hold head still    Cognitive Status Examination   Orientation orientation to person, place and time   Level of Consciousness alert   Range of Motion (ROM)   ROM Comment CROM WFL   Strength   Strength Comments not tested.    Bed Mobility   Bed Mobility Comments independent   Transfer Skills   Transfer Comments independent   Gait   Gait Comments Independent.  No gross deficits noted.    Balance Special Tests   Balance Special Tests Modified CTSIB Conditions   Balance Special Tests Modified CTSIB Conditions   Condition 1, seconds 30 Seconds   Condition 2, seconds 30 Seconds   Condition 4, seconds 30 Seconds   Condition 5, seconds 30 Seconds   Oculomotor Exam   Smooth Pursuit Normal   Saccades Normal   VOR Normal   VOR Cancellation Normal   Rapid Head Thrust Normal   Infrared Goggle Exam or Frenzel Lense Exam   Vestibular Suppressant in Last 24 Hours? No   Exam completed with Infrared Goggles   Spontaneous Nystagmus Negative   Gaze Evoked Nystagmus Negative   Head Shake Horizontal Nystagmus Negative   Sariah-Hallpike (right) Upbeating R torsional   Lavina-Hallpike (right) comments lasted for about 10 seconds and stopped.    Sariah-Hallpike (Left) Negative   HSCC Supine Roll Test (Right) Negative   HSCC Supine Roll Test (Left) Negative   BPPV Canal(s) R Posterior   BPPV Type Canalithasis   Planned Therapy Interventions   Planned Therapy Interventions Comment Repositioning maneuvers   Clinical Impression   Criteria for Skilled Therapeutic Interventions Met yes, treatment indicated   PT Diagnosis BPPV   Influenced by the following impairments positional vertigo   Functional limitations due to impairments impairing balance at times, limiting ability to work out and limiting movement of head with all activities.    Clinical Presentation Stable/Uncomplicated   Clinical Presentation Rationale + positional testing, dizziness variable with head position changes   Clinical Decision Making (Complexity) Low complexity   Therapy Frequency 1 time/week   Predicted Duration of Therapy Intervention (days/wks) 2 weeks   Risk & Benefits of therapy have been explained Yes   Patient, Family & other staff in agreement with plan of care Yes   GOALS   PT Eval Goals 1;2   Goal 1   Goal Identifier BPPV   Goal Description Client will demonstrate no nystagmus with all positional testing to demonstrate  resolution of BPPV   Target Date 03/07/18   Goal 2   Goal Identifier DHI   Goal Description Client will report score of 20 or less on DHI to demonstrate significant improvemtn on DHI   Target Date 03/07/18   Total Evaluation Time   Total Evaluation Time (Minutes) 20

## 2018-03-12 ENCOUNTER — HOSPITAL ENCOUNTER (OUTPATIENT)
Dept: ULTRASOUND IMAGING | Facility: CLINIC | Age: 33
Discharge: HOME | End: 2018-03-12
Attending: CLINICAL NURSE SPECIALIST | Admitting: CLINICAL NURSE SPECIALIST
Payer: COMMERCIAL

## 2018-03-12 DIAGNOSIS — N83.201 CYST OF RIGHT OVARY: ICD-10-CM

## 2018-03-12 PROCEDURE — 76830 TRANSVAGINAL US NON-OB: CPT

## 2018-04-02 ENCOUNTER — TRANSFERRED RECORDS (OUTPATIENT)
Dept: HEALTH INFORMATION MANAGEMENT | Facility: CLINIC | Age: 33
End: 2018-04-02

## 2018-04-16 ENCOUNTER — TRANSFERRED RECORDS (OUTPATIENT)
Dept: HEALTH INFORMATION MANAGEMENT | Facility: CLINIC | Age: 33
End: 2018-04-16

## 2018-06-21 ENCOUNTER — ONCOLOGY VISIT (OUTPATIENT)
Dept: ONCOLOGY | Facility: CLINIC | Age: 33
End: 2018-06-21
Attending: CLINICAL NURSE SPECIALIST
Payer: COMMERCIAL

## 2018-06-21 VITALS
BODY MASS INDEX: 27.46 KG/M2 | TEMPERATURE: 97.9 F | SYSTOLIC BLOOD PRESSURE: 105 MMHG | RESPIRATION RATE: 16 BRPM | DIASTOLIC BLOOD PRESSURE: 65 MMHG | OXYGEN SATURATION: 97 % | WEIGHT: 170 LBS | HEART RATE: 89 BPM

## 2018-06-21 DIAGNOSIS — Z91.89 AT RISK FOR CANCER: ICD-10-CM

## 2018-06-21 DIAGNOSIS — Z15.01 BRCA2 POSITIVE: Primary | ICD-10-CM

## 2018-06-21 DIAGNOSIS — Z15.09 BRCA2 POSITIVE: Primary | ICD-10-CM

## 2018-06-21 PROCEDURE — 99213 OFFICE O/P EST LOW 20 MIN: CPT | Performed by: CLINICAL NURSE SPECIALIST

## 2018-06-21 PROCEDURE — G0463 HOSPITAL OUTPT CLINIC VISIT: HCPCS

## 2018-06-21 ASSESSMENT — PAIN SCALES - GENERAL: PAINLEVEL: NO PAIN (0)

## 2018-06-21 NOTE — PROGRESS NOTES
Oncology Risk Management Consultation:  Date on this visit: 2018    Tessa Ospina  returns to the [facility] today  for a follow up oncology risk management consultation. She requires screening and surveillance to minimize her risk of cancer secondary to having a deleterious BRCA2 mutation.  She is considered to be at high risk for hereditary breast and ovarian cancer.She is here today with her son, Chet,  and her daughter, Nely.    Primary Physician:  Shameka Tovar MD    History Of Present Illness:  Ms. Ospina is a very pleasant, healthy 32 year old female who presents with family history of breast and ovarian cancer and a personal diagnosis of a BRCA2 mutation.     Genetic testin2015 - POSITIVE for a and a BRCA2 mutation specifically [id number], the same genetic mutation in her mother. Testing done using single site analysis through [employer].    Pertinent  history:  Menarche at age 13.  First child at age 26.    History of breastfeeding both children. No issues with mastitis.   Ovaries, fallopian tubes and uterus intact.  No history of breast hyperplasia, atypia or malignancy.   2016 - Prophylactic nipple sparing mastectomy with reconstruction (Dr. Nely Mcqueen and Dr. Lam Arora), pathology benign.    Screening history:  11/3/15 - Pelvic ultrasound - probable uterine fibroid in anterior myometrium and L ovarian collapsing follicle.   16: pelvic ultrasound, L complex ovarian lesion.  3/4/16: Pelvic ultrasound, small L ovarian cyst.   2016 - Pelvic US, normal. No lesions seen.  2017 - Pelvic US, normal, 2 cm dominant follicle in R ovary  2018 - 2 dominant cysts in R ovary, consider short term follow up  3/12/2018 - R cysts resolved, pelvic US normal.    3/19/2015 -  - 9  2015 -  - 18  3/3/2016 -  40 (high)  2016  - - 23  2017 -  - 23  2018 -  - 11      At this visit she being happy with her breast reconstruction. She  is doing well and has no complaints - no urinary urgency, frequency, abdominal pain, bloating, unusual vaginal discharge, constipation, early satiety.  She has no issues with unusual or changing moles.    Past Medical/Surgical History:  Past Medical History:   Diagnosis Date     BRCA2 positive      PONV (postoperative nausea and vomiting)     Unsure if this was due to anesthesia and/or vicodin.     Past Surgical History:   Procedure Laterality Date     BREAST BIOPSY, RT/LT Right 2015    benign      SECTION  ,          COSMETIC SURGERY  2016, 2016    Prophylactic bilateral mastectomy and reconstruction     GRAFT FAT TO BREAST Bilateral 2017    Procedure: GRAFT FAT TO BREAST;  Bilateral Breast mastopexy and Fat Grafting from Abdomen;  Surgeon: SUZY Arora MD;  Location: [facility] OR     GYN SURGERY  2013     MASTECTOMY, NIPPLE SPARING Bilateral 6/15/2016    Procedure: MASTECTOMY, NIPPLE SPARING;  Surgeon: Nely Mcqueen MD;  Location: [facility] OR     RECONSTRUCT BREAST BILATERAL Bilateral 6/15/2016    Procedure: RECONSTRUCT BREAST BILATERAL;  Surgeon: SUZY Arora MD;  Location: [facility] OR     RECONSTRUCT BREAST SECOND STAGE BILATERAL N/A 9/15/2016    Procedure: RECONSTRUCT BREAST SECOND STAGE BILATERAL;  Surgeon: SUZY Arora MD;  Location: [facility] OR     TONSILLECTOMY & ADENOIDECTOMY       [facility]         Allergies:  Allergies as of 2018 - Ambrosio as Reviewed 2018   Allergen Reaction Noted     Percocet [oxycodone-acetaminophen] Nausea and Vomiting 2015     Vicodin [hydrocodone-acetaminophen] Nausea and Vomiting 2010     Tramadol Nausea and Vomiting 2018       Current Medications:  No current outpatient prescriptions on file.        Family History:  Family History   Problem Relation Age of Onset     Breast Cancer Mother 49     BRCA2 genetic mutation     Pre-Diabetes Mother      Other Cancer Maternal Grandfather 60      Prostate and throat     Prostate Cancer Maternal Grandfather      Genetic Disorder Maternal Aunt      BRCA2 genetic mutation     Cancer Maternal Uncle 70     3 uncles (all 70s and 80s)     Breast Cancer Other 30     maternal cousin; BRCA2 genetic mutation     Other Cancer Other 60     maternal cousin; prostate cancer     Breast Cancer Maternal Aunt 70     Breast Cancer Other 40     maternal cousin     Cancer - colorectal Other 70     Maternal great grandfather     Cancer - colorectal Paternal Uncle 40      in 40s     Breast Cancer Other      Breast Cancer Cousin      Colon Cancer Other      Diabetes Other      Other Cancer Other        Social History:  Social History     Social History     Marital status:      Spouse name: Danny     Number of children: 2     Years of education: N/A     Occupational History      Self     Social History Main Topics     Smoking status: Never Smoker     Smokeless tobacco: Never Used     Alcohol use 0.0 oz/week     Drug use: No     Sexual activity: Yes     Partners: Male     Birth control/ protection: Male Surgical      Comment: no birth control at this time     Other Topics Concern     Caffeine Concern No     Occupational Exposure No     Hobby Hazards No     Sleep Concern No     Stress Concern Yes     Weight Concern Yes     in the process of getting back to prepregnancy weight     Special Diet No     Exercise No     keeping busy with 2 children under the age of 4; exercising using high intensity interval classes     Self-Exams Yes     Social History Narrative       Physical Exam:  There were no vitals taken for this visit.  Physical exam deferred.    Laboratory/Imaging Studies  Results for orders placed or performed during the hospital encounter of 18   US Pelvic Complete with Transvaginal    Narrative    PELVIC ULTRASOUND WITH ENDOVAGINAL TRANSDUCER IMAGING    3/12/2018  10:22 AM     HISTORY: Large ovarian cysts on right ovary. Cyst of right  ovary.    TECHNIQUE:  Endovaginal sonography was added to the transabdominal  exam to better evaluate the uterus and ovaries because of inadequate  bladder fullness.    COMPARISON: 1/25/2018    FINDINGS:  The uterus is normal in size, measuring 9.2 x 6.1 x 4.8 cm.   No focal lesions are seen in the myometrium.  Endometrium is 12 mm  thick and appears normal.      The right ovary measures 3.3 x 2.3 x 1.9 cm and appears normal.  Dominant right-sided cysts on the prior study have resolved. Left  ovary measures 3.7 x 3.1 x 2.9 cm and contains a dominant follicle  that measures 2.6 x 2.7 x 2.3 cm.  There are no adnexal masses.  There  is no free fluid in the cul-de-sac.      Impression    IMPRESSION:   Normal pelvic ultrasound.    MYRIAM YBARRA MD       ASSESSMENT  We reviewed her interval history and revisited the women interested in surgical prevention clinical trial. Tessa is interested in this, was given printed information on it and will explore it on her own. She is also interested in a dermatology referral at [place] [facility]. I have made this for hr today and she will make her own appointment.  She will also be making an appointment with [facility] for [facility] and exam. At this point, we will proceed with the following screening plan.    INDIVIDUALIZED CANCER RISK MANAGEMENT PLAN:  Individualized Surveillance Plan for women  Hereditary Breast and/or Ovarian Cancer Syndrome   Per NCCN Guidelines Version 1.2018   Recommended screening Test or procedure Last done Next Scheduled    Breast self awareness- starting at age 18. Women should be familiar with their breasts and promptly report changes to their care provider. Periodic, consistent self exam may facilitate breast self awareness.    6/21/2018 March 2019   Breast screening, starting at age 25 Clinical breast exams every 6 -12 months 6/21/2018    Breast screening   Age 25-29 Annual breast MRI screening with contrast  (preferred) or mammogram if MRI is unavailable or individualized based on family history if a breast cancer diagnosis before age 30 is present.       Breast MRI is performed preferably on day 7-15 of menstrual cycle for premenopausal women.     NA     NA   Breast screening   Age >30-75 years     Annual mammogram and   annual breast MRI, preferably on day 7-15 of menstrual cycle for premenopausal women.    Age>75 years, management/25/2018 - Pe should be considered on an individual basis.     NA     NA   Ovarian cancer screening, starting at age 30-35 Consider transvaginal ultrasound and  tests. 1/25/2018 - Pelvic US, 2 dominant cysts on R ovary    3/12/2018 - follow up pelvic US,  cysts resolved NEXT:     Sept. 2018 for pelvic US and then March 2019    Recommendation: risk-reducing salpingo-oophorectomy, typically between 35 and 40 years old and  upon completion of child bearing.     Because ovarian cancer onset in patients with BRCA2 mutations is on average of 8-10 years later than in patients with BRCA1 mutations, it is reasonable to delay risk-reducing salpingo-oophorectomy until 40-45 years in patients with BRCA2 mutations who have already maximized their breast cancer prevention (i.e., undergone bilateral mastectomy).    Salpingectomy alone is not the standard of care for risk reduction although clinical trials are ongoing.     Discuss option of risk-reducing mastectomy.    Consider risks and benefits of risk reducing agents, such as tamoxifen and raloxifene for breast and ovarian cancer.    Consider a full body skin and eye exam for melanoma screening for both BRCA1+ and BRCA2+.     NOTE: Women with BRCA mutation who are treated for breast cancer should have screening of the remaining breast tissue with annual mammography and breast MRI.    The International Cancer of the Pancreas Screening (CAPS) Consortium recommends that individuals with a BRCA2 mutation who have at least one first-degree relative with  pancreatic cancer should undergo initial screening with endoscopic ultrasonography and/or MRI/magnetic resonance cholangiopancreatography.     I will follow up on her screenings and see her in the Cancer Risk Management clinic in one year.    I spent 20 minutes with the patient with greater that 50% of it in counseling and coordinating care as documented above.    Cate Castro, MESHA-CNS, OCN, ANG-BC  Clinical Nurse Specialist  Cancer Risk Management Program  [address]  [place] [address]    phone:  [phone]  Pager: [phone]  fax: [phone]    Cc:  MD Arlen Garcia MD

## 2018-06-21 NOTE — PROGRESS NOTES
"Oncology Rooming Note    June 21, 2018 10:07 AM   Tessa Ospina is a 32 year old female who presents for:    Chief Complaint   Patient presents with     Oncology Clinic Visit     return pt     Initial Vitals: /65 (BP Location: Right arm, Patient Position: Sitting, Cuff Size: Adult Regular)  Pulse 89  Temp 97.9  F (36.6  C) (Oral)  Resp 16  Wt 77.1 kg (170 lb)  LMP 06/11/2018 (Exact Date)  SpO2 97%  Breastfeeding? No  BMI 27.46 kg/m2 Estimated body mass index is 27.46 kg/(m^2) as calculated from the following:    Height as of 2/12/18: 1.676 m (5' 5.98\").    Weight as of this encounter: 77.1 kg (170 lb). Body surface area is 1.89 meters squared.  No Pain (0) Comment: Data Unavailable   Patient's last menstrual period was 06/11/2018 (exact date).  Allergies reviewed: Yes  Medications reviewed: Yes    Medications: Medication refills not needed today.  Pharmacy name entered into [facility]:    [facility] [place], [place] - [address] [place] [address] [facility] [place], [place] - [address]  [facility] [address] [place] [address]    Clinical concerns: none Matthew was notified.    10 minutes for nursing intake (face to face time)     Tina Tafoya MA          "

## 2018-06-21 NOTE — MR AVS SNAPSHOT
After Visit Summary   6/21/2018    Tessa Ospina    MRN: [id number]           Patient Information     Date Of Birth          1985        Visit Information        Provider Department      6/21/2018 10:00 AM Cate Castro APRN [facility]        Today's Diagnoses     BRCA2 positive    -  1    At risk for cancer          Care Instructions    Individualized Surveillance Plan for women  Hereditary Breast and/or Ovarian Cancer Syndrome   Per NCCN Guidelines Version 1.2018   Recommended screening Test or procedure Last done Next Scheduled    Breast self awareness- starting at age 18. Women should be familiar with their breasts and promptly report changes to their care provider. Periodic, consistent self exam may facilitate breast self awareness.    6/21/2018 March 2019   Breast screening, starting at age 25 Clinical breast exams every 6 -12 months 6/21/2018    Breast screening   Age 25-29 Annual breast MRI screening with contrast (preferred) or mammogram if MRI is unavailable or individualized based on family history if a breast cancer diagnosis before age 30 is present.       Breast MRI is performed preferably on day 7-15 of menstrual cycle for premenopausal women.     NA     NA   Breast screening   Age >30-75 years     Annual mammogram and   annual breast MRI, preferably on day 7-15 of menstrual cycle for premenopausal women.    Age>75 years, management1/25/2018 - Pe should be considered on an individual basis.     NA     NA   Ovarian cancer screening, starting at age 30-35 Consider transvaginal ultrasound and  tests. 1/25/2018 - Pelvic US, 2 domiant cysts on R ovary    3/12/2018 - followup pelvic US,  cysts resolved NEXT:     Sept. 2018 for pelvic US and then March 2019    Recommendation: risk-reducing salpingo-oophorectomy, typically between 35 and 40 years old and  upon completion of child bearing.     Because ovarian cancer onset in patients with BRCA2  mutations is on average of 8-10 years later than in patients with BRCA1 mutations, it is reasonable to delay risk-reducing salpingo-oophorectomy until 40-45 years in patients with BRCA2 mutations who have already maximized their breast cancer prevention (i.e., undergone bilateral mastectomy).    Salpingectomy alone is not the standard of care for risk reduction although clinical trials are ongoing.     Discuss option of risk-reducing mastectomy.    Consider risks and benefits of risk reducing agents, such as tamoxifen and raloxifene for breast and ovarian cancer.    Consider a full body skin and eye exam for melanoma screening for both BRCA1+ and BRCA2+.     NOTE: Women with BRCA mutation who are treated for breast cancer should have screening of the remaining breast tissue with annual mammography and breast MRI.    The International Cancer of the Pancreas Screening (CAPS) Consortium recommends that individuals with a BRCA2 mutation who have at least one first-degree relative with pancreatic cancer should undergo initial screening with endoscopic ultrasonography and/or MRI/magnetic resonance cholangiopancreatography.                 Follow-ups after your visit        Additional Services     DERMATOLOGY REFERRAL       Your provider has referred you to: [facility] [place] [phone]   [email or web address]    Please be aware that coverage of these services is subject to the terms and limitations of your health insurance plan.  Call member services at your health plan with any benefit or coverage questions.      Please bring the following to your appointment:  Any x-rays, CTs or MRIs which have been performed.  Contact the facility where they were done to arrange for  prior to your scheduled appointment.  Any new CT, MRI or other procedures ordered by your specialist must be performed at a [facility] facility or coordinated by your clinic's referral office.    List of current  medications   This referral request   Any documents/labs given to you for this referral                  Follow-up notes from your care team     Return in about 8 months (around 3/1/2019) for Routine Visit.      Future tests that were ordered for you today     Open Standing Orders        Priority Remaining Interval Expires Ordered    US Pelvic Complete with Transvaginal Routine 2/2 6 9/1/2018 6/21/2018            Who to contact     If you have questions or need follow up information about today's clinic visit or your schedule please contact CANCER RISK MANAGEMENT PROGRAM directly at [phone].  Normal or non-critical lab and imaging results will be communicated to you by inDplayhart, letter or phone within 4 business days after the clinic has received the results. If you do not hear from us within 7 days, please contact the clinic through Qyuki or phone. If you have a critical or abnormal lab result, we will notify you by phone as soon as possible.  Submit refill requests through Qyuki or call your pharmacy and they will forward the refill request to us. Please allow 3 business days for your refill to be completed.          Additional Information About Your Visit        Qyuki Information     [employer]uki gives you secure access to your electronic health record. If you see a primary care provider, you can also send messages to your care team and make appointments. If you have questions, please call your primary care clinic.  If you do not have a primary care provider, please call [phone] and they will assist you.        Care EveryWhere ID     This is your Care EveryWhere ID. This could be used by other organizations to access your [facility] medical records  [id number]        Your Vitals Were     Pulse Temperature Respirations Last Period Pulse Oximetry Breastfeeding?    89 97.9  F (36.6  C) (Oral) 16 06/11/2018 (Exact Date) 97% No    BMI (Body Mass Index)                   27.46 kg/m2            Blood Pressure  from Last 3 Encounters:   06/21/18 105/65   02/21/18 111/80   02/12/18 106/68    Weight from Last 3 Encounters:   06/21/18 77.1 kg (170 lb)   02/21/18 77.1 kg (170 lb)   02/12/18 78.5 kg (173 lb)              We Performed the Following     DERMATOLOGY REFERRAL        Primary Care Provider Office Phone # Fax #    Shameka Tovar [phone]       [address]  [place] [address]        Equal Access to Services     [facility]: Hadii aad ku hadasho Soomaali, waaxda luqadaha, qaybta kaalmada adesergioyarasta, jes lopez . So [facility] [phone].    ATENCIÓN: Si habla español, tiene a cordero disposición servicios gratuitos de asistencia lingüística. [facility] [phone].    We comply with applicable federal civil rights laws and [place] laws. We do not discriminate on the basis of race, color, national origin, age, disability, sex, sexual orientation, or gender identity.            Thank you!     Thank you for choosing [facility]K [facility]NAGEMENT [facility]GRAM  for your care. Our goal is always to provide you with excellent care. Hearing back from our patients is one way we can continue to improve our services. Please take a few minutes to complete the written survey that you may receive in the mail after your visit with us. Thank you!             Your Updated Medication List - Protect others around you: Learn how to safely use, store and throw away your medicines at www.disposemymeds.org.      Notice  As of 6/21/2018 11:20 AM    You have not been prescribed any medications.

## 2018-06-21 NOTE — LETTER
Cancer Risk Management  Program Locations    [facility]  Mailing Address  Cancer Risk Management Program  [facility]  [address]  [place] [address]    New patient appointments  [phone]  2018    Tessa Ospina  [address] [address]      Dear Tessa,    It was a pleasure meeting with you today.  Below is a copy of my note from our visit, outlining your surveillance plan.      I look forward to seeing you in the future to coordinate your care and reduce your health risks. Please feel free to contact me if you have any questions or concerns.      Oncology Risk Management Consultation:  Date on this visit: 2018    Tessa Ospina  returns to the [facility] today  for a follow up oncology risk management consultation. She requires screening and surveillance to minimize her risk of cancer secondary to having a deleterious BRCA2 mutation.  She is considered to be at high risk for hereditary breast and ovarian cancer.She is here today with her son, Chet,  and her daughter, Nely.    Primary Physician:  Shameka Tovar MD    History Of Present Illness:  Ms. Ospina is a very pleasant, healthy 32 year old female who presents with family history of breast and ovarian cancer and a personal diagnosis of a BRCA2 mutation.     Genetic testin2015 - POSITIVE for a and a BRCA2 mutation specifically [id number], the same genetic mutation in her mother. Testing done using single site analysis through [employer].    Pertinent  history:  Menarche at age 13.  First child at age 26.    History of breastfeeding both children. No issues with mastitis.   Ovaries, fallopian tubes and uterus intact.  No history of breast hyperplasia, atypia or malignancy.   2016 - Prophylactic  nipple sparing mastectomy with reconstruction (Dr. Nely Mcqueen and Dr. Lam Arora), pathology benign.    Screening history:  11/3/15 - Pelvic ultrasound - probable uterine fibroid in anterior myometrium and L ovarian collapsing follicle.   16: pelvic ultrasound, L complex ovarian lesion.  3/4/16: Pelvic ultrasound, small L ovarian cyst.   2016 - Pelvic US, normal. No lesions seen.  2017 - Pelvic US, normal, 2 cm dominant follicle in R ovary  2018 - 2 dominant cysts in R ovary, consider short term follow up  3/12/2018 - R cysts resolved, pelvic US normal.    3/19/2015 -  - 9  2015 -  - 18  3/3/2016 -  40 (high)  2016  - - 23  2017 -  - 23  2018 -  - 11      At this visit she being happy with her breast reconstruction. She is doing well and has no complaints - no urinary urgency, frequency, abdominal pain, bloating, unusual vaginal discharge, constipation, early satiety.  She has no issues with unusual or changing moles.    Past Medical/Surgical History:  Past Medical History:   Diagnosis Date     BRCA2 positive      PONV (postoperative nausea and vomiting)     Unsure if this was due to anesthesia and/or vicodin.     Past Surgical History:   Procedure Laterality Date     BREAST BIOPSY, RT/LT Right 2015    benign      SECTION  ,          COSMETIC SURGERY  2016, 2016    Prophylactic bilateral mastectomy and reconstruction     GRAFT FAT TO BREAST Bilateral 2017    Procedure: GRAFT FAT TO BREAST;  Bilateral Breast mastopexy and Fat Grafting from Abdomen;  Surgeon: SUZY Arora MD;  Location:  OR     GYN SURGERY  2013     MASTECTOMY, NIPPLE SPARING Bilateral 6/15/2016    Procedure: MASTECTOMY, NIPPLE SPARING;  Surgeon: Nely Mcqueen MD;  Location:  OR     RECONSTRUCT BREAST BILATERAL Bilateral 6/15/2016    Procedure: RECONSTRUCT BREAST BILATERAL;  Surgeon: SUZY Arora MD;   Location: [facility] OR     RECONSTRUCT BREAST SECOND STAGE BILATERAL N/A 9/15/2016    Procedure: RECONSTRUCT BREAST SECOND STAGE BILATERAL;  Surgeon: SUZY Arora MD;  Location: [facility] OR     TONSILLECTOMY & ADENOIDECTOMY       [facility], [facility]         Allergies:  Allergies as of 2018 - Ambrosio as Reviewed 2018   Allergen Reaction Noted     Percocet [oxycodone-acetaminophen] Nausea and Vomiting 2015     Vicodin [hydrocodone-acetaminophen] Nausea and Vomiting 2010     Tramadol Nausea and Vomiting 2018       Current Medications:  No current outpatient prescriptions on file.        Family History:  Family History   Problem Relation Age of Onset     Breast Cancer Mother 49     BRCA2 genetic mutation     Pre-Diabetes Mother      Other Cancer Maternal Grandfather 60     Prostate and throat     Prostate Cancer Maternal Grandfather      Genetic Disorder Maternal Aunt      BRCA2 genetic mutation     Cancer Maternal Uncle 70     3 uncles (all 70s and 80s)     Breast Cancer Other 30     maternal cousin; BRCA2 genetic mutation     Other Cancer Other 60     maternal cousin; prostate cancer     Breast Cancer Maternal Aunt 70     Breast Cancer Other 40     maternal cousin     Cancer - colorectal Other 70     Maternal great grandfather     Cancer - colorectal Paternal Uncle 40      in 40s     Breast Cancer Other      Breast Cancer Cousin      Colon Cancer Other      Diabetes Other      Other Cancer Other        Social History:  Social History     Social History     Marital status:      Spouse name: Danny     Number of children: 2     Years of education: N/A     Occupational History      Self     Social History Main Topics     Smoking status: Never Smoker     Smokeless tobacco: Never Used     Alcohol use 0.0 oz/week     Drug use: No     Sexual activity: Yes     Partners: Male     Birth control/ protection: Male Surgical      Comment: no birth control at this time     Other Topics  Concern     Caffeine Concern No     Occupational Exposure No     Hobby Hazards No     Sleep Concern No     Stress Concern Yes     Weight Concern Yes     in the process of getting back to prepregnancy weight     Special Diet No     Exercise No     keeping busy with 2 children under the age of 4; exercising using high intensity interval classes     Self-Exams Yes     Social History Narrative       Physical Exam:  There were no vitals taken for this visit.  Physical exam deferred.    Laboratory/Imaging Studies  Results for orders placed or performed during the hospital encounter of 03/12/18   US Pelvic Complete with Transvaginal    Narrative    PELVIC ULTRASOUND WITH ENDOVAGINAL TRANSDUCER IMAGING    3/12/2018  10:22 AM     HISTORY: Large ovarian cysts on right ovary. Cyst of right ovary.    TECHNIQUE:  Endovaginal sonography was added to the transabdominal  exam to better evaluate the uterus and ovaries because of inadequate  bladder fullness.    COMPARISON: 1/25/2018    FINDINGS:  The uterus is normal in size, measuring 9.2 x 6.1 x 4.8 cm.   No focal lesions are seen in the myometrium.  Endometrium is 12 mm  thick and appears normal.      The right ovary measures 3.3 x 2.3 x 1.9 cm and appears normal.  Dominant right-sided cysts on the prior study have resolved. Left  ovary measures 3.7 x 3.1 x 2.9 cm and contains a dominant follicle  that measures 2.6 x 2.7 x 2.3 cm.  There are no adnexal masses.  There  is no free fluid in the cul-de-sac.      Impression    IMPRESSION:   Normal pelvic ultrasound.    MYRIAM YBARRA MD       ASSESSMENT  We reviewed her interval history and revisited the women interested in surgical prevention clinical trial. Tessa is interested in this, was given printed information on it and will explore it on her own. She is also interested in a dermatology referral at [facility]. I have made this for hr today and she will make her own appointment.  She will also be making an  appointment with [facility] Sal[facility] for [facility] and exam. At this point, we will proceed with the following screening plan.    INDIVIDUALIZED CANCER RISK MANAGEMENT PLAN:  Individualized Surveillance Plan for women  Hereditary Breast and/or Ovarian Cancer Syndrome   Per NCCN Guidelines Version 1.2018   Recommended screening Test or procedure Last done Next Scheduled    Breast self awareness- starting at age 18. Women should be familiar with their breasts and promptly report changes to their care provider. Periodic, consistent self exam may facilitate breast self awareness.    6/21/2018 March 2019   Breast screening, starting at age 25 Clinical breast exams every 6 -12 months 6/21/2018    Breast screening   Age 25-29 Annual breast MRI screening with contrast (preferred) or mammogram if MRI is unavailable or individualized based on family history if a breast cancer diagnosis before age 30 is present.       Breast MRI is performed preferably on day 7-15 of menstrual cycle for premenopausal women.     NA     NA   Breast screening   Age >30-75 years     Annual mammogram and   annual breast MRI, preferably on day 7-15 of menstrual cycle for premenopausal women.    Age>75 years, management/25/2018 - Pe should be considered on an individual basis.     NA     NA   Ovarian cancer screening, starting at age 30-35 Consider transvaginal ultrasound and  tests. 1/25/2018 - Pelvic US, 2 dominant cysts on R ovary    3/12/2018 - follow up pelvic US,  cysts resolved NEXT:     Sept. 2018 for pelvic US and then March 2019    Recommendation: risk-reducing salpingo-oophorectomy, typically between 35 and 40 years old and  upon completion of child bearing.     Because ovarian cancer onset in patients with BRCA2 mutations is on average of 8-10 years later than in patients with BRCA1 mutations, it is reasonable to delay risk-reducing salpingo-oophorectomy until 40-45 years in patients with BRCA2 mutations who have  already maximized their breast cancer prevention (i.e., undergone bilateral mastectomy).    Salpingectomy alone is not the standard of care for risk reduction although clinical trials are ongoing.     Discuss option of risk-reducing mastectomy.    Consider risks and benefits of risk reducing agents, such as tamoxifen and raloxifene for breast and ovarian cancer.    Consider a full body skin and eye exam for melanoma screening for both BRCA1+ and BRCA2+.     NOTE: Women with BRCA mutation who are treated for breast cancer should have screening of the remaining breast tissue with annual mammography and breast MRI.    The International Cancer of the Pancreas Screening (CAPS) Consortium recommends that individuals with a BRCA2 mutation who have at least one first-degree relative with pancreatic cancer should undergo initial screening with endoscopic ultrasonography and/or MRI/magnetic resonance cholangiopancreatography.     I will follow up on her screenings and see her in the Cancer Risk Management clinic in one year.    I spent 20 minutes with the patient with greater that 50% of it in counseling and coordinating care as documented above.    Cate Castro, MESHA-CNS, OCN, ANG-BC  Clinical Nurse Specialist  Cancer Risk Management Program  [address]  [place] [address]    phone:  [phone]  Pager: [phone]  fax: [phone]    Cc:  MD Arlen Garcia MD                Oncology Rooming Note    June 21, 2018 10:07 AM   Tessa Ospina is a 32 year old female who presents for:    Chief Complaint   Patient presents with     Oncology Clinic Visit     return pt     Initial Vitals: /65 (BP Location: Right arm, Patient Position: Sitting, Cuff Size: Adult Regular)  Pulse 89  Temp 97.9  F (36.6  C) (Oral)  Resp 16  Wt 77.1 kg (170 lb)  LMP 06/11/2018 (Exact Date)  SpO2 97%  Breastfeeding? No  BMI 27.46 kg/m2 Estimated  "body mass index is 27.46 kg/(m^2) as calculated from the following:    Height as of 2/12/18: 1.676 m (5' 5.98\").    Weight as of this encounter: 77.1 kg (170 lb). Body surface area is 1.89 meters squared.  No Pain (0) Comment: Data Unavailable   Patient's last menstrual period was 06/11/2018 (exact date).  Allergies reviewed: Yes  Medications reviewed: Yes    Medications: Medication refills not needed today.  Pharmacy name entered into [facility]:    [facility] [place], [place] - [address] [place] [address] [facility] [place], [place] - [address]  [facility] [id number] [place] [address]    Clinical concerns: none Matthew was notified.    10 minutes for nursing intake (face to face time)     Tina Tafoya MA                                "

## 2018-06-21 NOTE — PATIENT INSTRUCTIONS
Individualized Surveillance Plan for women  Hereditary Breast and/or Ovarian Cancer Syndrome   Per NCCN Guidelines Version 1.2018   Recommended screening Test or procedure Last done Next Scheduled    Breast self awareness- starting at age 18. Women should be familiar with their breasts and promptly report changes to their care provider. Periodic, consistent self exam may facilitate breast self awareness.    6/21/2018 March 2019   Breast screening, starting at age 25 Clinical breast exams every 6 -12 months 6/21/2018    Breast screening   Age 25-29 Annual breast MRI screening with contrast (preferred) or mammogram if MRI is unavailable or individualized based on family history if a breast cancer diagnosis before age 30 is present.       Breast MRI is performed preferably on day 7-15 of menstrual cycle for premenopausal women.     NA     NA   Breast screening   Age >30-75 years     Annual mammogram and   annual breast MRI, preferably on day 7-15 of menstrual cycle for premenopausal women.    Age>75 years, management1/25/2018 - Pe should be considered on an individual basis.     NA     NA   Ovarian cancer screening, starting at age 30-35 Consider transvaginal ultrasound and  tests. 1/25/2018 - Pelvic US, 2 domiant cysts on R ovary    3/12/2018 - followup pelvic US,  cysts resolved NEXT:     Sept. 2018 for pelvic US and then March 2019    Recommendation: risk-reducing salpingo-oophorectomy, typically between 35 and 40 years old and  upon completion of child bearing.     Because ovarian cancer onset in patients with BRCA2 mutations is on average of 8-10 years later than in patients with BRCA1 mutations, it is reasonable to delay risk-reducing salpingo-oophorectomy until 40-45 years in patients with BRCA2 mutations who have already maximized their breast cancer prevention (i.e., undergone bilateral mastectomy).    Salpingectomy alone is not the standard of care for risk reduction although clinical trials are  ongoing.     Discuss option of risk-reducing mastectomy.    Consider risks and benefits of risk reducing agents, such as tamoxifen and raloxifene for breast and ovarian cancer.    Consider a full body skin and eye exam for melanoma screening for both BRCA1+ and BRCA2+.     NOTE: Women with BRCA mutation who are treated for breast cancer should have screening of the remaining breast tissue with annual mammography and breast MRI.    The International Cancer of the Pancreas Screening (CAPS) Consortium recommends that individuals with a BRCA2 mutation who have at least one first-degree relative with pancreatic cancer should undergo initial screening with endoscopic ultrasonography and/or MRI/magnetic resonance cholangiopancreatography.

## 2018-09-07 ENCOUNTER — OFFICE VISIT (OUTPATIENT)
Dept: FAMILY MEDICINE | Facility: CLINIC | Age: 33
End: 2018-09-07
Payer: COMMERCIAL

## 2018-09-07 VITALS
HEART RATE: 71 BPM | WEIGHT: 169 LBS | OXYGEN SATURATION: 100 % | DIASTOLIC BLOOD PRESSURE: 66 MMHG | TEMPERATURE: 97.8 F | HEIGHT: 66 IN | BODY MASS INDEX: 27.16 KG/M2 | SYSTOLIC BLOOD PRESSURE: 99 MMHG

## 2018-09-07 DIAGNOSIS — Z15.01 HEREDITARY BREAST AND OVARIAN CANCER SYNDROME ASSOCIATED WITH MUTATION IN BRCA2 GENE: ICD-10-CM

## 2018-09-07 DIAGNOSIS — Z15.02 HEREDITARY BREAST AND OVARIAN CANCER SYNDROME ASSOCIATED WITH MUTATION IN BRCA2 GENE: ICD-10-CM

## 2018-09-07 DIAGNOSIS — Z01.818 PREOP GENERAL PHYSICAL EXAM: Primary | ICD-10-CM

## 2018-09-07 DIAGNOSIS — Z13.220 SCREENING FOR HYPERLIPIDEMIA: ICD-10-CM

## 2018-09-07 LAB
ERYTHROCYTE [DISTWIDTH] IN BLOOD BY AUTOMATED COUNT: 12.6 % (ref 10–15)
HCT VFR BLD AUTO: 40.7 % (ref 35–47)
HGB BLD-MCNC: 14 G/DL (ref 11.7–15.7)
MCH RBC QN AUTO: 30.8 PG (ref 26.5–33)
MCHC RBC AUTO-ENTMCNC: 34.4 G/DL (ref 31.5–36.5)
MCV RBC AUTO: 90 FL (ref 78–100)
PLATELET # BLD AUTO: 298 10E9/L (ref 150–450)
RBC # BLD AUTO: 4.54 10E12/L (ref 3.8–5.2)
WBC # BLD AUTO: 7 10E9/L (ref 4–11)

## 2018-09-07 PROCEDURE — 36415 COLL VENOUS BLD VENIPUNCTURE: CPT | Performed by: INTERNAL MEDICINE

## 2018-09-07 PROCEDURE — 80061 LIPID PANEL: CPT | Performed by: INTERNAL MEDICINE

## 2018-09-07 PROCEDURE — 99214 OFFICE O/P EST MOD 30 MIN: CPT | Performed by: INTERNAL MEDICINE

## 2018-09-07 PROCEDURE — 82947 ASSAY GLUCOSE BLOOD QUANT: CPT | Performed by: INTERNAL MEDICINE

## 2018-09-07 PROCEDURE — 85027 COMPLETE CBC AUTOMATED: CPT | Performed by: INTERNAL MEDICINE

## 2018-09-07 NOTE — PROGRESS NOTES
[facility]  [address] [address]  [phone]  Dept: [phone]    PRE-OP EVALUATION:  Today's date: 2018    **PREOP FAXED** Desire Pelletier CMA       Tessa Ospina (: 1985) presents for pre-operative evaluation assessment as requested by Dr. Rdz .  She requires evaluation and anesthesia risk assessment prior to undergoing surgery/procedure for treatment of  Left ACL     Proposed Surgery/ Procedure: left ACL   Date of Surgery/ Procedure:   Time of Surgery/ Procedure: 7 am   Hospital/Surgical Facility:   [facility]    Fax number for surgical facility: [phone]  Primary Physician: Shameka Tovar  Type of Anesthesia Anticipated: General    Patient has a Health Care Directive or Living Will:  NONO    1. NO - Do you have a history of heart attack, stroke, stent, bypass or surgery on an artery in the head, neck, heart or legs?  2. NO - Do you ever have any pain or discomfort in your chest?  3. NO - Do you have a history of  Heart Failure?  4. NO - Are you troubled by shortness of breath when: walking on the level, up a slight hill or at night?  5. NO - Do you currently have a cold, bronchitis or other respiratory infection?  6. NO - Do you have a cough, shortness of breath or wheezing?  7. NO - Do you sometimes get pains in the calves of your legs when you walk?  8. NO - Do you or anyone in your family have previous history of blood clots?  9. NO - Do you or does anyone in your family have a serious bleeding problem such as prolonged bleeding following surgeries or cuts?  10. NO - Have you ever had problems with anemia or been told to take iron pills?  11. NO - Have you had any abnormal blood loss such as black, tarry or bloody stools, or abnormal vaginal bleeding?  12. NO - Have you ever had a blood transfusion?  13. NO - Have you or any of your relatives ever had problems with anesthesia?  14. NO - Do you have sleep apnea,  excessive snoring or daytime drowsiness?  15. NO - Do you have any prosthetic heart valves?  16. NO - Do you have prosthetic joints?  17. NO - Is there any chance that you may be pregnant?      HPI:     HPI related to upcoming procedure: Dr. Rdz at [facility]. She injured it skiing in  March.      See problem list for active medical problems.  Problems all longstanding and stable, except as noted/documented.  See ROS for pertinent symptoms related to these conditions.                                                                                                                                                          .    MEDICAL HISTORY:     Patient Active Problem List    Diagnosis Date Noted     Status post bilateral breast reconstruction 2016     Priority: Medium     S/P bilateral mastectomy 06/15/2016     Priority: Medium     Hereditary breast and ovarian cancer syndrome associated with mutation in BRCA2 gene (H) 2016     Priority: Medium     BRCA gene positive 2015     Priority: Medium     White classification A2 gestational diabetes mellitus (GDM) 2013     Priority: Medium      Past Medical History:   Diagnosis Date     BRCA2 positive      PONV (postoperative nausea and vomiting)     Unsure if this was due to anesthesia and/or vicodin.     Past Surgical History:   Procedure Laterality Date     BREAST BIOPSY, RT/LT Right 2015    benign      SECTION  ,          COSMETIC SURGERY  2016, 2016    Prophylactic bilateral mastectomy and reconstruction     GRAFT FAT TO BREAST Bilateral 2017    Procedure: GRAFT FAT TO BREAST;  Bilateral Breast mastopexy and Fat Grafting from Abdomen;  Surgeon: SUZY Arora MD;  [facility]     GYN SURGERY  2013     MASTECTOMY, NIPPLE SPARING Bilateral 6/15/2016    Procedure: MASTECTOMY, NIPPLE SPARING;  Surgeon: Nely Mcqueen MD;  Location: [facility] OR     RECONSTRUCT BREAST BILATERAL  "Bilateral 6/15/2016    Procedure: RECONSTRUCT BREAST BILATERAL;  Surgeon: SUZY Arora MD;  Location: [facility] OR     RECONSTRUCT BREAST SECOND STAGE BILATERAL N/A 9/15/2016    Procedure: RECONSTRUCT BREAST SECOND STAGE BILATERAL;  Surgeon: SUZY Arora MD;  Location: [facility] OR     TONSILLECTOM[facility] & ADENOIDECTOM[facility]  2007     [facility] [facility]       No current outpatient prescriptions on file.     OTC products: None, except as noted above    Allergies   Allergen Reactions     Percocet [Oxycodone-Acetaminophen] Nausea and Vomiting     Vicodin [Hydrocodone-Acetaminophen] Nausea and Vomiting     Sensitivity to narcotics.     Tramadol Nausea and Vomiting      Latex Allergy: NO    Social History   Substance Use Topics     Smoking status: Never Smoker     Smokeless tobacco: Never Used     Alcohol use 0.0 oz/week     History   Drug Use No       REVIEW OF SYSTEMS:   Constitutional, neuro, ENT, endocrine, pulmonary, cardiac, gastrointestinal, genitourinary, musculoskeletal, integument and psychiatric systems are negative, except as otherwise noted.    EXAM:   BP 99/66 (BP Location: Left arm, Cuff Size: Adult Regular)  Pulse 71  Temp 97.8  F (36.6  C) (Oral)  Ht 5' 5.98\" (1.676 m)  Wt 169 lb (76.7 kg)  LMP 08/29/2018  SpO2 100%  BMI 27.29 kg/m2    GENERAL APPEARANCE: healthy, alert and no distress     EYES: EOMI, PERRL     HENT: ear canals and TM's normal and nose and mouth without ulcers or lesions     NECK: no adenopathy, no asymmetry, masses, or scars and thyroid normal to palpation     RESP: lungs clear to auscultation - no rales, rhonchi or wheezes     CV: regular rates and rhythm, normal S1 S2, no S3 or S4 and no murmur, click or rub     ABDOMEN:  soft, nontender, no HSM or masses and bowel sounds normal     MS: extremities normal- no gross deformities noted, no evidence of inflammation in joints, FROM in all extremities.     SKIN: no suspicious lesions or rashes     NEURO: Normal strength and tone, " sensory exam grossly normal, mentation intact and speech normal     PSYCH: mentation appears normal. and affect normal/bright     LYMPHATICS: No cervical adenopathy    DIAGNOSTICS:       Recent Labs   Lab Test  08/29/17   0922 09/12/16   1843   HGB  14.5  13.4   PLT  258   --    NA  138   --    POTASSIUM  4.0   --    CR  0.76   --    A1C  4.9   --         IMPRESSION:   Reason for surgery/procedure: ACL reconstruction  Diagnosis/reason for consult: Pre-op for the above    The proposed surgical procedure is considered INTERMEDIATE risk.    REVISED CARDIAC RISK INDEX  The patient has the following serious cardiovascular risks for perioperative complications such as (MI, PE, VFib and 3  AV Block):  No serious cardiac risks  INTERPRETATION: 0 risks: Class I (very low risk - 0.4% complication rate)    The patient has the following additional risks for perioperative complications:  No identified additional risks      ICD-10-CM    1. Preop general physical exam Z01.818 CBC with platelets   2. Screening for hyperlipidemia Z13.220 Lipid panel reflex to direct LDL Fasting     Glucose   3. Hereditary breast and ovarian cancer syndrome associated with mutation in BRCA2 gene (H) C50.919     Z15.01     C56.9        RECOMMENDATIONS:     APPROVAL GIVEN to proceed with proposed procedure, without further diagnostic evaluation       Signed Electronically by: Shameka Tovar MD    Copy of this evaluation report is provided to requesting physician.    [facility] Preop Guidelines    Revised Cardiac Risk Index

## 2018-09-07 NOTE — MR AVS SNAPSHOT
After Visit Summary   9/7/2018    Tessa Ospina    MRN: [id number]           Patient Information     Date Of Birth          1985        Visit Information        Provider Department      9/7/2018 12:40 PM Shameka Tovar MD [facility] [place]        Today's Diagnoses     Preop general physical exam    -  1    Screening for hyperlipidemia          Care Instructions      Before Your Surgery      Call your surgeon if there is any change in your health. This includes signs of a cold or flu (such as a sore throat, runny nose, cough, rash or fever).    Do not smoke, drink alcohol or take over the counter medicine (unless your surgeon or primary care doctor tells you to) for the 24 hours before and after surgery.    If you take prescribed drugs: Follow your doctor s orders about which medicines to take and which to stop until after surgery.    Eating and drinking prior to surgery: follow the instructions from your surgeon    Take a shower or bath the night before surgery. Use the soap your surgeon gave you to gently clean your skin. If you do not have soap from your surgeon, use your regular soap. Do not shave or scrub the surgery site.  Wear clean pajamas and have clean sheets on your bed.           Follow-ups after your visit        Your next 10 appointments already scheduled     Sep 11, 2018 11:20 AM CDT   PHYSICAL with Jailyn Palacio MD   [facility]    [address] [place] [id number] [phone]              Who to contact     If you have questions or need follow up information about today's clinic visit or your schedule please contact [facility] [place] directly at [phone].  Normal or non-critical lab and imaging results will be communicated to you by MyChart, letter or phone within 4 business days after the clinic has received the results. If you do not hear from us within 7 days,  "please contact the clinic through NeoStem or phone. If you have a critical or abnormal lab result, we will notify you by phone as soon as possible.  Submit refill requests through NeoStem or call your pharmacy and they will forward the refill request to us. Please allow 3 business days for your refill to be completed.          Additional Information About Your Visit        BlizuuharKanshu Information     NeoStem gives you secure access to your electronic health record. If you see a primary care provider, you can also send messages to your care team and make appointments. If you have questions, please call your primary care clinic.  If you do not have a primary care provider, please call [phone] and they will assist you.        Care EveryWhere ID     This is your Care EveryWhere ID. This could be used by other organizations to access your [place] medical records  [id number]        Your Vitals Were     Pulse Temperature Height Last Period Pulse Oximetry BMI (Body Mass Index)    71 97.8  F (36.6  C) (Oral) 5' 5.98\" (1.676 m) 08/29/2018 100% 27.29 kg/m2       Blood Pressure from Last 3 Encounters:   09/07/18 99/66   06/21/18 105/65   02/21/18 111/80    Weight from Last 3 Encounters:   09/07/18 169 lb (76.7 kg)   06/21/18 170 lb (77.1 kg)   02/21/18 170 lb (77.1 kg)              We Performed the Following     CBC with platelets     Glucose     Lipid panel reflex to direct LDL Fasting        Primary Care Provider Office Phone # Fax #    Shamekacaro Tovar [phone]       [address]  [place] [address]        Equal Access to Services     [facility]: Hadii torsten Rosales, waaxda ban, qaybta swatialjes rojas. So [facility] [phone].    ATENCIÓN: Si habla español, tiene a cordero disposición servicios gratuitos de asistencia lingüística. Jasvir al [phone].    We comply with applicable federal civil rights laws and [place] laws. We do " not discriminate on the basis of race, color, national origin, age, disability, sex, sexual orientation, or gender identity.            Thank you!     Thank you for choosing [facility] [place]  for your care. Our goal is always to provide you with excellent care. Hearing back from our patients is one way we can continue to improve our services. Please take a few minutes to complete the written survey that you may receive in the mail after your visit with us. Thank you!             Your Updated Medication List - Protect others around you: Learn how to safely use, store and throw away your medicines at www.disposemymeds.org.      Notice  As of 9/7/2018  1:03 PM    You have not been prescribed any medications.

## 2018-09-08 LAB
CHOLEST SERPL-MCNC: 154 MG/DL
GLUCOSE SERPL-MCNC: 84 MG/DL (ref 70–99)
HDLC SERPL-MCNC: 60 MG/DL
LDLC SERPL CALC-MCNC: 79 MG/DL
NONHDLC SERPL-MCNC: 94 MG/DL
TRIGL SERPL-MCNC: 75 MG/DL

## 2018-09-11 ENCOUNTER — OFFICE VISIT (OUTPATIENT)
Dept: OBGYN | Facility: CLINIC | Age: 33
End: 2018-09-11
Payer: COMMERCIAL

## 2018-09-11 VITALS
DIASTOLIC BLOOD PRESSURE: 68 MMHG | HEIGHT: 66 IN | BODY MASS INDEX: 27.48 KG/M2 | WEIGHT: 171 LBS | SYSTOLIC BLOOD PRESSURE: 116 MMHG

## 2018-09-11 DIAGNOSIS — Z15.09 BRCA2 GENE MUTATION POSITIVE: ICD-10-CM

## 2018-09-11 DIAGNOSIS — Z01.419 ENCOUNTER FOR GYNECOLOGICAL EXAMINATION WITHOUT ABNORMAL FINDING: Primary | ICD-10-CM

## 2018-09-11 DIAGNOSIS — Z15.01 BRCA2 GENE MUTATION POSITIVE: ICD-10-CM

## 2018-09-11 PROCEDURE — 99385 PREV VISIT NEW AGE 18-39: CPT | Performed by: OBSTETRICS & GYNECOLOGY

## 2018-09-11 ASSESSMENT — ANXIETY QUESTIONNAIRES
2. NOT BEING ABLE TO STOP OR CONTROL WORRYING: NOT AT ALL
1. FEELING NERVOUS, ANXIOUS, OR ON EDGE: NOT AT ALL
IF YOU CHECKED OFF ANY PROBLEMS ON THIS QUESTIONNAIRE, HOW DIFFICULT HAVE THESE PROBLEMS MADE IT FOR YOU TO DO YOUR WORK, TAKE CARE OF THINGS AT HOME, OR GET ALONG WITH OTHER PEOPLE: NOT DIFFICULT AT ALL
GAD7 TOTAL SCORE: 0
5. BEING SO RESTLESS THAT IT IS HARD TO SIT STILL: NOT AT ALL
3. WORRYING TOO MUCH ABOUT DIFFERENT THINGS: NOT AT ALL
7. FEELING AFRAID AS IF SOMETHING AWFUL MIGHT HAPPEN: NOT AT ALL
6. BECOMING EASILY ANNOYED OR IRRITABLE: NOT AT ALL

## 2018-09-11 ASSESSMENT — PATIENT HEALTH QUESTIONNAIRE - PHQ9: 5. POOR APPETITE OR OVEREATING: NOT AT ALL

## 2018-09-11 NOTE — MR AVS SNAPSHOT
After Visit Summary   9/11/2018    Tessa Ospina    MRN: [id number]           Patient Information     Date Of Birth          1985        Visit Information        Provider Department      9/11/2018 11:20 AM Jailyn Palacio MD [facility] [place]        Today's Diagnoses     Encounter for gynecological examination without abnormal finding    -  1    BRCA2 gene mutation positive           Follow-ups after your visit        Future tests that were ordered for you today     Open Future Orders        Priority Expected Expires Ordered     Routine  9/11/2019 9/11/2018            Who to contact     If you have questions or need follow up information about today's clinic visit or your schedule please contact [facility] [place] directly at [phone].  Normal or non-critical lab and imaging results will be communicated to you by Box Jumphart, letter or phone within 4 business days after the clinic has received the results. If you do not hear from us within 7 days, please contact the clinic through Box Jumphart or phone. If you have a critical or abnormal lab result, we will notify you by phone as soon as possible.  Submit refill requests through Proxima Cancion or call your pharmacy and they will forward the refill request to us. Please allow 3 business days for your refill to be completed.          Additional Information About Your Visit        MyChart Information     Pro[employer] Cancion gives you secure access to your electronic health record. If you see a primary care provider, you can also send messages to your care team and make appointments. If you have questions, please call your primary care clinic.  If you do not have a primary care provider, please call [phone] and they will assist you.        Care EveryWhere ID     This is your Care EveryWhere ID. This could be used by other organizations to access your [facility] medical records  [id number]        Your Vitals Were     Height Last Period  "Breastfeeding? BMI (Body Mass Index)          5' 6\" (1.676 m) 08/29/2018 (Exact Date) No 27.6 kg/m2         Blood Pressure from Last 3 Encounters:   09/11/18 116/68   09/07/18 99/66   06/21/18 105/65    Weight from Last 3 Encounters:   09/11/18 171 lb (77.6 kg)   09/07/18 169 lb (76.7 kg)   06/21/18 170 lb (77.1 kg)               Primary Care Provider Office Phone # Fax #    Shameka Tovar [phone]       [address]  [place] [address]        Equal Access to Services     [facility]: Hadii torsten roberto Soaida, waaxda luqadaha, qaybta kaalmada adeegyada, jes lopez . So [facility] [phone].    ATENCIÓN: Si habla español, tiene a cordero disposición servicios gratuitos de asistencia lingüística. Llame al [phone].    We comply with applicable federal civil rights laws and [place] laws. We do not discriminate on the basis of race, color, national origin, age, disability, sex, sexual orientation, or gender identity.            Thank you!     Thank you for choosing [facility]  for your care. Our goal is always to provide you with excellent care. Hearing back from our patients is one way we can continue to improve our services. Please take a few minutes to complete the written survey that you may receive in the mail after your visit with us. Thank you!             Your Updated Medication List - Protect others around you: Learn how to safely use, store and throw away your medicines at www.disposemymeds.org.      Notice  As of 9/11/2018 12:17 PM    You have not been prescribed any medications.      "

## 2018-09-11 NOTE — PROGRESS NOTES
Tessa is a 32 year old  female who presents for annual exam.     Besides routine health maintenance, she has no other health concerns today .    HPI:  The patient's PCP is Shameka Tovar MD.    Patient is BrCA 2+, has had bilateral mastectomies with reconstruction and implants  Has 2 kids  History of normal paps, last one negative , plan repeat   Plan is for BILATERAL SALPINGO-OOPHORECTOMY between 35-40  She is followed closely by oncology, gets twice yearly pelvic ultrasound and annual , due in Jún[place]  Ultrasounds are done thru oncology  They suggested ocp but patient has been reluctant  Vasectomy for contraception        GYNECOLOGIC HISTORY:    Patient's last menstrual period was 2018 (exact date).  Her current contraception method is: vasectomy.  She  reports that she has never smoked. She has never used smokeless tobacco.    Patient is sexually active.  STD testing offered?  Declined  Last PHQ-9 score on record =   PHQ-9 SCORE 2018   Total Score 0     Last GAD7 score on record =   BRITT-7 SCORE 2018   Total Score -   Total Score 0     Alcohol Score = 1    HEALTH MAINTENANCE:  Cholesterol: (  Cholesterol   Date Value Ref Range Status   2018 154 <200 mg/dL Final   2017 156 <200 mg/dL Final      Last Mammo: bilateral mastectomies  Pap:   Lab Results   Component Value Date    PAP NIL 2016 WNL HPV (-)negAnswers for HPI/ROS submitted by the patient on 9/10/2018   PHQ-2 Score: 0  General Symptoms: No  Skin Symptoms: No  HENT Symptoms: No  EYE SYMPTOMS: No  HEART SYMPTOMS: No  LUNG SYMPTOMS: No  INTESTINAL SYMPTOMS: No  URINARY SYMPTOMS: No  GYNECOLOGIC SYMPTOMS: No  BREAST SYMPTOMS: No  SKELETAL SYMPTOMS: No  BLOOD SYMPTOMS: No  NERVOUS SYSTEM SYMPTOMS: No  MENTAL HEALTH SYMPTOMS: No    Colonoscopy:  NA, Result: not applicable, Next Colonoscopy: NA years.  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 (H)     S/P bilateral mastectomy     Status post bilateral breast reconstruction     White classification A2 gestational diabetes mellitus (GDM)     Past Surgical History:   Procedure Laterality Date     BREAST BIOPSY, RT/LT Right 2015    benign      SECTION  ,          COSMETIC SURGERY  2016, 2016    Prophylactic bilateral mastectomy and reconstruction     GRAFT FAT TO BREAST Bilateral 2017    Procedure: GRAFT FAT TO BREAST;  Bilateral Breast mastopexy and Fat Grafting from Abdomen;  Surgeon: SUZY Arora MD;  Location:  OR     GYN SURGERY  2013     MASTECTOMY, NIPPLE SPARING Bilateral 6/15/2016    Procedure: MASTECTOMY, NIPPLE SPARING;  Surgeon: Nely Mcqueen MD;  Location:  OR     RECONSTRUCT BREAST BILATERAL Bilateral 6/15/2016    Procedure: RECONSTRUCT BREAST BILATERAL;  Surgeon: SUZY Arora MD;  Location:  OR     RECONSTRUCT BREAST SECOND STAGE BILATERAL N/A 9/15/2016    Procedure: RECONSTRUCT BREAST SECOND STAGE BILATERAL;  Surgeon: SUZY Arora MD;  Location:  OR     TONSILLECTOMY & ADENOIDECTOMY  [address] [place] [address]        Social History   Substance Use Topics     Smoking status: Never Smoker     Smokeless tobacco: Never Used     Alcohol use 0.0 oz/week      Problem (# of Occurrences) Relation (Name,Age of Onset)    Breast Cancer (6) Mother (Maryann Waters, 49): BRCA2 genetic mutation, Other (30): maternal cousin; BRCA2 genetic mutation, Maternal Aunt (70), Other (40): maternal cousin, Other (Great aunt), Cousin (Cousin)    Cancer (1) Maternal Uncle (70): 3  "uncles (all 70s and 80s)    Cancer - colorectal (2) Other (70): Maternal great grandfather, Paternal Uncle (40):  in 40s    Colon Cancer (1) Other (Uncle)    Diabetes (1) Other (Uncle)    Genetic Disorder (1) Maternal Aunt: BRCA2 genetic mutation    Other Cancer (3) Maternal Grandfather (Max Villar, 60): Prostate and throat, Other (60): maternal cousin; prostate cancer, Other (4 Great Uncles)    Pre-Diabetes (1) Mother (Maryann Waters)    Prostate Cancer (1) Maternal Grandfather (Max Villar)            No current outpatient prescriptions on file.     No current facility-administered medications for this visit.      Allergies   Allergen Reactions     Percocet [Oxycodone-Acetaminophen] Nausea and Vomiting     Vicodin [Hydrocodone-Acetaminophen] Nausea and Vomiting     Sensitivity to narcotics.     Tramadol Nausea and Vomiting       Past medical, surgical, social and family histories were reviewed and updated in EPIC.    ROS:   Skin: New Skin Lesions    EXAM:  /68  Ht 5' 6\" (1.676 m)  Wt 171 lb (77.6 kg)  LMP 2018 (Exact Date)  Breastfeeding? No  BMI 27.6 kg/m2   BMI: Body mass index is 27.6 kg/(m^2).    PHYSICAL EXAM:  Constitutional:  Appearance: Well nourished, well developed, alert, in no acute distress  Neck:  Lymph Nodes:  No lymphadenopathy present    Thyroid:  Gland size normal, nontender, no nodules or masses present  on palpation  Chest:  Respiratory Effort:  Breathing unlabored  Cardiovascular:    Heart: Auscultation:  Regular rate, normal rhythm, no murmurs present  Breasts: Inspection of Breasts:  No lymphadenopathy present., Palpation of Breasts and Axillae:  No masses present on palpation, no breast tenderness., Axillary Lymph Nodes:  No lymphadenopathy present. and No nodularity, asymmetry or nipple discharge bilaterally.  Gastrointestinal:   Abdominal Examination:  Abdomen nontender to palpation, tone normal without rigidity or guarding, no masses present, umbilicus " without lesions   Liver and Spleen:  No hepatomegaly present, liver nontender to palpation    Hernias:  No hernias present  Lymphatic: Lymph Nodes:  No other lymphadenopathy present  Skin:  General Inspection:  No rashes present, no lesions present, no areas of  discoloration    Genitalia and Groin:  No rashes present, no lesions present, no areas of  discoloration, no masses present  Neurologic/Psychiatric:    Mental Status:  Oriented X3     Pelvic Exam:  External Genitalia:     Normal appearance for age, no discharge present, no tenderness present, no inflammatory lesions present, color normal  Vagina:     Normal vaginal vault without central or paravaginal defects, no discharge present, no inflammatory lesions present, no masses present  Bladder:     Nontender to palpation  Urethra:   Urethral Body:  Urethra palpation normal, urethra structural support normal   Urethral Meatus:  No erythema or lesions present  Cervix:     Appearance healthy, no lesions present, nontender to palpation, no bleeding present  Uterus:     Uterus: firm, normal sized and nontender, midplane in position.   Adnexa:     No adnexal tenderness present, no adnexal masses present  Perineum:     Perineum within normal limits, no evidence of trauma, no rashes or skin lesions present  Anus:     Anus within normal limits, no hemorrhoids present  Inguinal Lymph Nodes:     No lymphadenopathy present  Pubic Hair:     Normal pubic hair distribution for age  Genitalia and Groin:     No rashes present, no lesions present, no areas of discoloration, no masses present      COUNSELING:   Reviewed preventive health counseling, as reflected in patient instructions       cancer screening    BMI: Body mass index is 27.6 kg/(m^2).  Weight management plan: Patient was referred to their PCP to discuss a diet and exercise plan.    ASSESSMENT:  32 year old female with satisfactory annual exam.    ICD-10-CM    1. Encounter for gynecological examination without  abnormal finding Z01.419        PLAN:  Plan Ca 125 in winter  Continue twice a year ultrasound  Can schedule BILATERAL SALPINGO-OOPHORECTOMY as soon as she is ready  Discussed that we may be able to hormone replacement therapy after bso but would need to discuss with oncology  Would follow with early bone density scans and Vit D levels  Patient says she needs to get colonoscopy soon as well.   Plan pap and hpv next years , patient wants more often    Jailyn Palacio MD

## 2018-09-12 ENCOUNTER — TRANSFERRED RECORDS (OUTPATIENT)
Dept: HEALTH INFORMATION MANAGEMENT | Facility: CLINIC | Age: 33
End: 2018-09-12

## 2018-09-12 ASSESSMENT — ANXIETY QUESTIONNAIRES: GAD7 TOTAL SCORE: 0

## 2018-09-12 ASSESSMENT — PATIENT HEALTH QUESTIONNAIRE - PHQ9: SUM OF ALL RESPONSES TO PHQ QUESTIONS 1-9: 0

## 2018-09-13 ENCOUNTER — TELEPHONE (OUTPATIENT)
Dept: FAMILY MEDICINE | Facility: CLINIC | Age: 33
End: 2018-09-13

## 2018-09-13 NOTE — TELEPHONE ENCOUNTER
Form completed and faxed to [facility]. Copy sent to [facility] and original mailed to the pt.  Rose Larsen,

## 2018-10-05 ENCOUNTER — TELEPHONE (OUTPATIENT)
Dept: FAMILY MEDICINE | Facility: CLINIC | Age: 33
End: 2018-10-05

## 2018-10-24 NOTE — TELEPHONE ENCOUNTER
Patient states the date that the lab was done was missing from form  TC sees two forms one with the date and one without  refaxed with the date labs were done and fasting checked  Eda COOK

## 2018-10-31 ENCOUNTER — HOSPITAL ENCOUNTER (OUTPATIENT)
Dept: ULTRASOUND IMAGING | Facility: CLINIC | Age: 33
Discharge: HOME | End: 2018-10-31
Attending: CLINICAL NURSE SPECIALIST | Admitting: CLINICAL NURSE SPECIALIST
Payer: COMMERCIAL

## 2018-10-31 DIAGNOSIS — Z15.01 BRCA2 POSITIVE: ICD-10-CM

## 2018-10-31 DIAGNOSIS — Z91.89 AT RISK FOR CANCER: ICD-10-CM

## 2018-10-31 DIAGNOSIS — Z15.09 BRCA2 POSITIVE: ICD-10-CM

## 2018-10-31 PROCEDURE — 76856 US EXAM PELVIC COMPLETE: CPT

## 2018-11-06 ENCOUNTER — TELEPHONE (OUTPATIENT)
Dept: ONCOLOGY | Facility: CLINIC | Age: 33
End: 2018-11-06

## 2018-11-06 NOTE — TELEPHONE ENCOUNTER
Message left for patient Re: schedule RTC +Pelvic Ultrasound April 2019 as per Cate Castro staff brody

## 2018-11-19 ENCOUNTER — TRANSFERRED RECORDS (OUTPATIENT)
Dept: HEALTH INFORMATION MANAGEMENT | Facility: CLINIC | Age: 33
End: 2018-11-19

## 2018-12-03 ASSESSMENT — ENCOUNTER SYMPTOMS
BREAST MASS: 0
BREAST PAIN: 1

## 2018-12-04 ENCOUNTER — OFFICE VISIT (OUTPATIENT)
Dept: PLASTIC SURGERY | Facility: CLINIC | Age: 33
End: 2018-12-04
Attending: PLASTIC SURGERY
Payer: COMMERCIAL

## 2018-12-04 VITALS
DIASTOLIC BLOOD PRESSURE: 62 MMHG | SYSTOLIC BLOOD PRESSURE: 106 MMHG | OXYGEN SATURATION: 99 % | RESPIRATION RATE: 16 BRPM | WEIGHT: 171 LBS | TEMPERATURE: 97.8 F | BODY MASS INDEX: 27.48 KG/M2 | HEART RATE: 98 BPM | HEIGHT: 66 IN

## 2018-12-04 DIAGNOSIS — Z98.890 STATUS POST BILATERAL BREAST RECONSTRUCTION: Primary | ICD-10-CM

## 2018-12-04 PROCEDURE — G0463 HOSPITAL OUTPT CLINIC VISIT: HCPCS | Mod: ZF

## 2018-12-04 ASSESSMENT — PAIN SCALES - GENERAL: PAINLEVEL: NO PAIN (0)

## 2018-12-04 NOTE — PROGRESS NOTES
POSTOPERATIVE VISIT NOTE      PRESENTING COMPLAINT:  Postoperative visit status post bilateral breast reconstruction for bilateral nipple-sparing mastectomies for high risk for breast cancer, last surgery done in 11/2017.      HISTORY OF PRESENTING COMPLAINT:  Ms. Ospina is 33 years old.  She underwent prophylactic mastectomies with immediate implant-based reconstruction.  She is at least a year out from her last surgery.  Overall, happy with the aesthetics of the breasts.  Has been noticing some shooting pains in her nipple areolar complexes bilaterally whenever she wears a compressive garment like a sports bra.  It is now and again.  It is not inhibiting her from day-to-day activities.  She has not noticed any other symptoms.  She is also concerned about ALCL and is here to discuss that.      PHYSICAL EXAMINATION:   VITALS SIGNS:  Stable.  She is afebrile, in no obvious distress.     CHEST:  Both breasts are aesthetic, symmetric and no evidence for any palpable lumps or abnormality.  She has minimal rippling.  She has grade 2 capsular contracture.      ASSESSMENT AND PLAN:  Based on above findings, a diagnosis of bilateral breast reconstruction for high risk for breast cancer prophylactic mastectomies was made.  Overall, I reassured her that I do not feel anything pathologic.  Most likely, the pain is related to pressure and nerves that can take over a year to 2 to fully heal.  As long as things remain stable and do not worsen or are not associated with any other signs or symptoms, I think this is just her nerves trying to heal.  With regards to ALCL, we did have a dillon discussion about ALCL.  She has smooth gel implants from [place].  I explained to the patient that these implants have not been associated with ALCL.  Textured implants have been and also, the kind of texturing matters.  In her particular case, she has no evidence for any delayed seromas which is usually in which these cases have presented.  I  reassured her. She understood everything.  I will see her back on a yearly basis.  All questions were answered.  She was happy with the visit.

## 2018-12-04 NOTE — MR AVS SNAPSHOT
After Visit Summary   12/4/2018    Tessa Ospina    MRN: [id number]           Patient Information     Date Of Birth          1985        Visit Information        Provider Department      12/4/2018 9:00 AM SUZY Arora MD [facility]        Today's Diagnoses     Status post bilateral breast reconstruction    -  1       Follow-ups after your visit        Follow-up notes from your care team     Return if symptoms worsen or fail to improve.      Your next 10 appointments already scheduled     Apr 18, 2019  9:00 AM CDT   Return Visit with MESHA oCnrad CNS   Cancer Risk Management Program ([facility])    [facility]  [address]  [facility] [address] [phone]              Who to contact     If you have questions or need follow up information about today's clinic visit or your schedule please contact [facility] directly at [phone].  Normal or non-critical lab and imaging results will be communicated to you by DiscountDochart, letter or phone within 4 business days after the clinic has received the results. If you do not hear from us within 7 days, please contact the clinic through Creditablet or phone. If you have a critical or abnormal lab result, we will notify you by phone as soon as possible.  Submit refill requests through AisleBuyer or call your pharmacy and they will forward the refill request to us. Please allow 3 business days for your refill to be completed.          Additional Information About Your Visit        MyChart Information     A[employer]Buyer gives you secure access to your electronic health record. If you see a primary care provider, you can also send messages to your care team and make appointments. If you have questions, please call your primary care clinic.  If you do not have a primary care provider, please call [phone] and they will assist you.        Care EveryWhere ID     This is your  "Care EveryWhere ID. This could be used by other organizations to access your [facility] medical records  [id number]        Your Vitals Were     Pulse Temperature Respirations Height Pulse Oximetry BMI (Body Mass Index)    98 97.8  F (36.6  C) (Oral) 16 5' 5.98\" 99% 27.61 kg/m2       Blood Pressure from Last 3 Encounters:   12/04/18 106/62   09/11/18 116/68   09/07/18 99/66    Weight from Last 3 Encounters:   12/04/18 171 lb   09/11/18 171 lb   09/07/18 169 lb              Today, you had the following     No orders found for display       Primary Care Provider Office Phone # Fax #    Shameka Tovar [phone]       [address] [facility] [address]        Equal Access to Services     JULIA DIOP : Hadii torsten curtis hadasho Soomaali, waaxda luqadaha, qaybta kaalmada adesergioyada, jes lopez . So [facility] [phone].    ATENCIÓN: Si habla español, tiene a cordero disposición servicios gratuitos de asistencia lingüística. Jasvir al [phone].    We comply with applicable federal civil rights laws and [place] laws. We do not discriminate on the basis of race, color, national origin, age, disability, sex, sexual orientation, or gender identity.            Thank you!     Thank you for choosing [facility]  for your care. Our goal is always to provide you with excellent care. Hearing back from our patients is one way we can continue to improve our services. Please take a few minutes to complete the written survey that you may receive in the mail after your visit with us. Thank you!             Your Updated Medication List - Protect others around you: Learn how to safely use, store and throw away your medicines at www.disposemymeds.org.      Notice  As of 12/4/2018  2:13 PM    You have not been prescribed any medications.      "

## 2018-12-04 NOTE — LETTER
12/4/2018       RE: Tessa Ospina  [address]  [facility] [address]     Dear Colleague,    Thank you for referring your patient, Tessa Ospina, to the [facility] at [facility]. Please see a copy of my visit note below.    POSTOPERATIVE VISIT NOTE      PRESENTING COMPLAINT:  Postoperative visit status post bilateral breast reconstruction for bilateral nipple-sparing mastectomies for high risk for breast cancer, last surgery done in 11/2017.      HISTORY OF PRESENTING COMPLAINT:  Ms. Ospina is 33 years old.  She underwent prophylactic mastectomies with immediate implant-based reconstruction.  She is at least a year out from her last surgery.  Overall, happy with the aesthetics of the breasts.  Has been noticing some shooting pains in her nipple areolar complexes bilaterally whenever she wears a compressive garment like a sports bra.  It is now and again.  It is not inhibiting her from day-to-day activities.  She has not noticed any other symptoms.  She is also concerned about ALCL and is here to discuss that.      PHYSICAL EXAMINATION:   VITALS SIGNS:  Stable.  She is afebrile, in no obvious distress.     CHEST:  Both breasts are aesthetic, symmetric and no evidence for any palpable lumps or abnormality.  She has minimal rippling.  She has grade 2 capsular contracture.      ASSESSMENT AND PLAN:  Based on above findings, a diagnosis of bilateral breast reconstruction for high risk for breast cancer prophylactic mastectomies was made.  Overall, I reassured her that I do not feel anything pathologic.  Most likely, the pain is related to pressure and nerves that can take over a year to 2 to fully heal.  As long as things remain stable and do not worsen or are not associated with any other signs or symptoms, I think this is just her nerves trying to heal.  With regards to ALCL, we did have a dillon discussion about ALCL.  She has smooth gel implants from [place].  I  explained to the patient that these implants have not been associated with ALCL.  Textured implants have been and also, the kind of texturing matters.  In her particular case, she has no evidence for any delayed seromas which is usually in which these cases have presented.  I reassured her. She understood everything.  I will see her back on a yearly basis.  All questions were answered.  She was happy with the visit.         Again, thank you for allowing me to participate in the care of your patient.      Sincerely,    SUZY Arora MD

## 2019-02-25 ENCOUNTER — TRANSFERRED RECORDS (OUTPATIENT)
Dept: HEALTH INFORMATION MANAGEMENT | Facility: CLINIC | Age: 34
End: 2019-02-25

## 2019-04-17 ENCOUNTER — TELEPHONE (OUTPATIENT)
Dept: ONCOLOGY | Facility: CLINIC | Age: 34
End: 2019-04-17

## 2019-04-17 NOTE — TELEPHONE ENCOUNTER
Message left for patient to schedule Ultrasound and labs per Cate Castro request.  See staff message

## 2019-04-17 NOTE — TELEPHONE ENCOUNTER
----- Message from MESHA Conrad sent at 4/16/2019  3:43 PM CDT -----  Regarding: Please make her an appt for a pelvic US and blood draw ()  If you could please call her and get those set up ASAP that would be great.  She does have a follow up with me on 5/2 but I think she needs to move that, too.  She can get her imaging and blood draw and I can give her the results if she can't make it in to see me.    Thanks.  MESHA Conrad-CNS, OCN, AGN-BC  Clinical Nurse Specialist  Cancer Risk Management Program  [address]  [place] [address]    phone:  [phone]  Pager: [phone]  fax: [phone]

## 2019-04-18 ENCOUNTER — TELEPHONE (OUTPATIENT)
Dept: ONCOLOGY | Facility: CLINIC | Age: 34
End: 2019-04-18

## 2019-04-18 NOTE — TELEPHONE ENCOUNTER
Message left for patient to schedule ultrasound and labs as requested by Cate Castro staff message.

## 2019-04-26 ENCOUNTER — HOSPITAL ENCOUNTER (OUTPATIENT)
Dept: ULTRASOUND IMAGING | Facility: CLINIC | Age: 34
Discharge: HOME | End: 2019-04-26
Attending: CLINICAL NURSE SPECIALIST | Admitting: CLINICAL NURSE SPECIALIST
Payer: COMMERCIAL

## 2019-04-26 ENCOUNTER — HOSPITAL ENCOUNTER (OUTPATIENT)
Dept: LAB | Facility: CLINIC | Age: 34
End: 2019-04-26
Attending: CLINICAL NURSE SPECIALIST
Payer: COMMERCIAL

## 2019-04-26 DIAGNOSIS — Z15.01 BRCA2 POSITIVE: ICD-10-CM

## 2019-04-26 DIAGNOSIS — Z15.09 BRCA2 POSITIVE: ICD-10-CM

## 2019-04-26 DIAGNOSIS — Z91.89 AT RISK FOR CANCER: ICD-10-CM

## 2019-04-26 LAB — CANCER AG125 SERPL-ACNC: 14 U/ML (ref 0–30)

## 2019-04-26 PROCEDURE — 86304 IMMUNOASSAY TUMOR CA 125: CPT | Performed by: CLINICAL NURSE SPECIALIST

## 2019-04-26 PROCEDURE — 36415 COLL VENOUS BLD VENIPUNCTURE: CPT | Performed by: CLINICAL NURSE SPECIALIST

## 2019-04-26 PROCEDURE — 76830 TRANSVAGINAL US NON-OB: CPT

## 2019-05-01 NOTE — PROGRESS NOTES
Oncology Risk Management Consultation:  Date on this visit: 2019    Tessa Ospina returns to the [facility] today for a follow up oncology risk management consultation. She requires screening and surveillance to minimize her risk of cancer secondary to having a deleterious BRCA2 mutation.  She is considered to be at high risk for hereditary breast and ovarian cancer.    Primary Physician:  Shameka Tovar MD    History Of Present Illness:  Ms. Ospina is a very pleasant, healthy 33 year old female who presents with family history of breast and ovarian cancer and a personal diagnosis of a BRCA2 mutation.     Genetic testin2015 - POSITIVE for a BRCA2 mutation specifically [id number], the same genetic mutation in her mother. Testing done using single site analysis through [facility].    Pertinent  history:  Menarche at age 13.  First child at age 26.    History of breastfeeding both children. No issues with mastitis.   Ovaries, fallopian tubes and uterus intact.  No history of breast hyperplasia, atypia or malignancy.   2016 - Prophylactic nipple sparing mastectomy with reconstruction (Dr. Nely Mcqueen and Dr. Lam Arora), pathology benign.    Screening history:  11/3/15 - Pelvic ultrasound - probable uterine fibroid in anterior myometrium and L ovarian collapsing follicle.   16: pelvic ultrasound, L complex ovarian lesion.  3/4/16: Pelvic ultrasound, small L ovarian cyst.   2016 - Pelvic US, normal. No lesions seen.  2017 - Pelvic US, normal, 2 cm dominant follicle in R ovary  2018 - 2 dominant cysts in R ovary, consider short term follow up  3/12/2018 - R cysts resolved, pelvic US normal.  10/31/2018 -Pelvic ultrasound, negative pelvic ultrasound.  2019 - Pelvic ultrasound, Probable corpus luteal cyst in the right ovary. No other  definite abnormality    3/19/2015 -  - 9  2015 -  - 18  3/3/2016 -  40 (high)  2016  - - 23  2017 -   - 23  2018 -  - 11  2019 -  - 14    Review of Systems:  GENERAL: No change in weight, sleep or appetite.  Normal energy.  No fever or chills  EYES: Negative for vision changes or eye problems  ENT: No problems with ears, nose or throat.  No difficulty swallowing.  RESP: No coughing, wheezing or shortness of breath  CV: No chest pains or palpitations  GI: No nausea, vomiting,  heartburn, abdominal pain, diarrhea, constipation or change in bowel habits  GYN: Denies unusual vaginal bleeding, abdominal pain, early satiety.   : No urinary frequency or dysuria, bladder or kidney problems  MUSCULOSKELETAL: No significant muscle or joint pains  NEUROLOGIC: No headaches, numbness, tingling, weakness, problems with balance or coordination  PSYCHIATRIC: No problems with anxiety, depression or mental health  HEME/IMMUNE/ALLERGY: No history of bleeding or clotting problems or anemia.  No allergies or immune system problems  ENDOCRINE: No history of thyroid disease, diabetes or other endocrine disorders  SKIN: No rashes,worrisome lesions or skin problems        Past Medical/Surgical History:  Past Medical History:   Diagnosis Date     BRCA2 positive     has had bilateral mastectomies and reconstruction, still has ovaries     PONV (postoperative nausea and vomiting)     Unsure if this was due to anesthesia and/or vicodin.     Past Surgical History:   Procedure Laterality Date     BREAST BIOPSY, RT/LT Right 2015    benign      SECTION  , 2013     x 2     COSMETIC SURGERY  2016, 2016    Prophylactic bilateral mastectomy and reconstruction     GRAFT FAT TO BREAST Bilateral 2017    Procedure: GRAFT FAT TO BREAST;  Bilateral Breast mastopexy and Fat Grafting from Abdomen;  Surgeon: SUZY Arora MD;  Location:  OR     MASTECTOMY, NIPPLE SPARING Bilateral 6/15/2016    Procedure: MASTECTOMY, NIPPLE SPARING;  Surgeon: Nely Mcqueen MD;  Location:  [facility] OR     RECONSTRUCT BREAST BILATERAL Bilateral 6/15/2016    Procedure: RECONSTRUCT BREAST BILATERAL;  Surgeon: SUZY Arora MD;  Location: [facility] OR     RECONSTRUCT BREAST SECOND STAGE BILATERAL N/A 9/15/2016    Procedure: RECONSTRUCT BREAST SECOND STAGE BILATERAL;  Surgeon: SUZY Arora MD;  Location: [facility] OR     TONSILLECTOMY & ADENOIDECTOMY       [facility] GUIDEWIRE         Allergies:  Allergies as of 2019 - Reviewed 2019   Allergen Reaction Noted     Percocet [oxycodone-acetaminophen] Nausea and Vomiting 2015     Vicodin [hydrocodone-acetaminophen] Nausea and Vomiting 2010     Tramadol Nausea and Vomiting 2018       Current Medications:  Current Outpatient Medications   Medication Sig Dispense Refill     magnesium 250 MG tablet Take 1 tablet by mouth daily          Family History:  Family History   Problem Relation Age of Onset     Breast Cancer Mother 49        BRCA2 genetic mutation     Pre-Diabetes Mother      Other Cancer Maternal Grandfather 60        Prostate and throat     Prostate Cancer Maternal Grandfather      Genetic Disorder Maternal Aunt         BRCA2 genetic mutation     Cancer Maternal Uncle 70        3 uncles (all 70s and 80s)     Breast Cancer Other 30        maternal cousin; BRCA2 genetic mutation     Other Cancer Other 60        maternal cousin; prostate cancer     Breast Cancer Maternal Aunt 70     Breast Cancer Other 40        maternal cousin     Cancer - colorectal Other 70        Maternal great grandfather     Cancer - colorectal Paternal Uncle 40         in 40s     Breast Cancer Other      Breast Cancer Cousin      Colon Cancer Other      Diabetes Other      Other Cancer Other        Social History:  Social History     Socioeconomic History     Marital status:      Spouse name: Danny     Number of children: 2     Years of education: Not on file     Highest education level: Not on file   Occupational History     Occupation:  [employer]     Employer: SELF   Social Needs     Financial resource strain: Not on file     Food insecurity:     Worry: Not on file     Inability: Not on file     Transportation needs:     Medical: Not on file     Non-medical: Not on file   Tobacco Use     Smoking status: Never Smoker     Smokeless tobacco: Never Used   Substance and Sexual Activity     Alcohol use: Yes     Alcohol/week: 0.0 oz     Drug use: No     Sexual activity: Yes     Partners: Male     Birth control/protection: Male Surgical     Comment: vasectomy   Lifestyle     Physical activity:     Days per week: Not on file     Minutes per session: Not on file     Stress: Not on file   Relationships     Social connections:     Talks on phone: Not on file     Gets together: Not on file     Attends Islam service: Not on file     Active member of club or organization: Not on file     Attends meetings of clubs or organizations: Not on file     Relationship status: Not on file     Intimate partner violence:     Fear of current or ex partner: Not on file     Emotionally abused: Not on file     Physically abused: Not on file     Forced sexual activity: Not on file   Other Topics Concern     Parent/sibling w/ CABG, MI or angioplasty before 65F 55M? Not Asked      Service Not Asked     Blood Transfusions Not Asked     Caffeine Concern No     Occupational Exposure No     Hobby Hazards No     Sleep Concern No     Stress Concern Yes     Weight Concern Yes     Comment: in the process of getting back to prepregnancy weight     Special Diet No     Back Care Not Asked     Exercise No     Comment: keeping busy with 2 children under the age of 4; exercising using high intensity interval classes     Bike Helmet Not Asked     Seat Belt Not Asked     Self-Exams Yes   Social History Narrative     Not on file       Physical Exam:  /70 (BP Location: Right arm, Patient Position: Sitting, Cuff Size: Adult Regular)   Pulse 74   Temp 98.3  F (36.8  C) (Oral)   Resp  "18   Ht 1.676 m (5' 6\")   Wt 81.2 kg (179 lb)   SpO2 99%   BMI 28.89 kg/m    Physical exam deferred.    Laboratory/Imaging Studies  Results for orders placed or performed during the hospital encounter of 04/26/19      Result Value Ref Range     14 0 - 30 U/mL       ASSESSMENT  Tessa is here today for an annual check and meeting.  We talked recently to discuss the results of her pelvic ultrasound that I had Dr. Debi Can review in clinic last Friday.  It is thought that she continually has normal corpus luteum ovarian follicles.  She will continue to have pelvic ultrasounds every 6 months.  She will schedule her next one for October.  She did make contact twice in [facility] to participate in the with trial, but she states that she did not get through to the correct person and was told she was in eligible for the trial.  We discussed that one option she would have to reduce her risk for ovarian cancer would be to have a preventative salpingectomy, but she declines to do this as she is amenable to paying for only one surgery.  She is asymptomatic today, and her only concern is pain in the nipple area when bouncing or jogging.  She has discussed this with Dr. Prabhakar and he has suggested removing her native nipple tissue.  She is considering this at this point.      She is aware that current literature shows that women with BRCA 2 mutations have a 45-49% lifetime risk of breast cancer, compared to the general population risk of 12%.  Those with a BRCA2 mutation also bear an approximate 11-18% lifetime risk of ovarian cancer, including primary peritoneal and fallopian tube cancer, compared to the 1-2% lifetime risk within the general population. The mean age diagnosis of ovarian cancer for BRCA1/2 carriers is estimated to be 50.8 years old.  There is also an increased risk of pancreatic cancer and melanoma.Male BRCA2 mutation carriers have a cumulative breast cancer risk of over 6% by " age 70 and prostate cancer risk of approximately 15% by age 65.    She will continue to work on health promotion with diet exercise and self-care and proceed with a screening plan below.    INDIVIDUALIZED CANCER RISK MANAGEMENT PLAN:  Individualized Surveillance Plan for women  Hereditary Breast and/or Ovarian Cancer Syndrome   Per NCCN Guidelines Version 3.2019   Recommended screening Test or procedure Last done Next Scheduled    Breast self awareness- starting at age 18. Women should be familiar with their breasts and promptly report changes to their care provider. Periodic, consistent self exam may facilitate breast self awareness. Premenopausal women may find self exams to be most informative when performed at the end of menses. 5/2/2019 Ongoing   Breast screening, starting at age 25 Clinical breast exams every 6 -12 months 5/2/2019 5/2/2019   Breast screening   Age 25-29 Annual breast MRI screening with contrast (or mammogram if MRI is unavailable) or individualized based on family history if a breast cancer diagnosis before age 30 is present.       Breast MRI is performed preferably on day 7-15 of menstrual cycle for premenopausal women.   NA - prophylactic mastectomy in 2016   NA   Breast screening   Age >30-75 years     Annual mammogram (consider tomosynthesis mammogram) and annual screening MRI.     Breast MRI is performed preferably on day 7-15 of menstrual cycle for premenopausal women.    Age>75 years, management should be considered on an individual basis.   NA   NA   Ovarian cancer screening, starting at age 30-35 Consider transvaginal ultrasound and  tests. 4/26/2019- right ovarian cyst     within normal limits October 2019 for both pelvic US and    Recommendation: risk-reducing salpingo-oophorectomy(RRSO), typically between 35 and 40 years old and  upon completion of child bearing.     Because ovarian cancer onset in patients with BRCA2 mutations is on average of 8-10 years later than  in patients with BRCA1 mutations, it is reasonable to delay RRSO until 40-45 years in patients with BRCA2 mutations  unless age at diagnosis in the family warrants earlier age for consideration for prophylactic surgery.    Limited data suggest that there may be a slightly increased risk of serous uterine cancer among women with BRCA1+ pathogenic variants. The provider and the patient should discuss the risks and benefits of a concurrent hysterectomy at the time of RRSO for women with a BRCA1+ pathogenic variant /like pathogenic variant prior to surgery.     Salpingectomy alone is not the standard of care for risk reduction although clinical trials are ongoing.     Discuss option of risk-reducing mastectomy.    Consider risks and benefits of risk reducing agents, such as tamoxifen and raloxifene for breast and ovarian cancer.    Consider a full body skin and eye exam for melanoma screening for both BRCA1+ and BRCA2+.     NOTE: Women with BRCA mutation who are treated for breast cancer should have screening of the remaining breast tissue with annual mammography and breast MRI.    The International Cancer of the Pancreas Screening (CAPS) Consortium recommends that individuals with a BRCA2 mutation who have at least one first-degree relative with pancreatic cancer should undergo initial screening with endoscopic ultrasonography and/or MRI/magnetic resonance cholangiopancreatography.       I spent 20 minutes with the patient with greater that 50% of it in counseling and coordinating care as documented above.    Cate Castro, MESHA-CNS, OCN, AGN-BC  Clinical Nurse Specialist  Cancer Risk Management Program  [address]  [place] [address]    phone:  [phone]  Pager: [phone]  fax: [phone]    CC: Shameka Tovar MD

## 2019-05-02 ENCOUNTER — ONCOLOGY VISIT (OUTPATIENT)
Dept: ONCOLOGY | Facility: CLINIC | Age: 34
End: 2019-05-02
Attending: CLINICAL NURSE SPECIALIST
Payer: COMMERCIAL

## 2019-05-02 VITALS
DIASTOLIC BLOOD PRESSURE: 70 MMHG | TEMPERATURE: 98.3 F | BODY MASS INDEX: 28.77 KG/M2 | WEIGHT: 179 LBS | OXYGEN SATURATION: 99 % | RESPIRATION RATE: 18 BRPM | HEART RATE: 74 BPM | HEIGHT: 66 IN | SYSTOLIC BLOOD PRESSURE: 104 MMHG

## 2019-05-02 DIAGNOSIS — Z15.01 BRCA2 POSITIVE: Primary | ICD-10-CM

## 2019-05-02 DIAGNOSIS — Z15.09 BRCA2 POSITIVE: Primary | ICD-10-CM

## 2019-05-02 PROBLEM — S83.519A ACL (ANTERIOR CRUCIATE LIGAMENT) RUPTURE: Status: ACTIVE | Noted: 2018-03-21

## 2019-05-02 PROCEDURE — 99214 OFFICE O/P EST MOD 30 MIN: CPT | Performed by: CLINICAL NURSE SPECIALIST

## 2019-05-02 PROCEDURE — G0463 HOSPITAL OUTPT CLINIC VISIT: HCPCS

## 2019-05-02 RX ORDER — MULTIVITAMIN WITH IRON
1 TABLET ORAL DAILY
COMMUNITY

## 2019-05-02 ASSESSMENT — PAIN SCALES - GENERAL: PAINLEVEL: NO PAIN (0)

## 2019-05-02 ASSESSMENT — MIFFLIN-ST. JEOR: SCORE: 1528.69

## 2019-05-02 NOTE — PROGRESS NOTES
"Oncology Rooming Note    May 2, 2019 11:35 AM   Tessa Ospina is a 33 year old female who presents for:    Chief Complaint   Patient presents with     Oncology Clinic Visit     Status post bilateral breast reconstruction     Initial Vitals: /70 (BP Location: Right arm, Patient Position: Sitting, Cuff Size: Adult Regular)   Pulse 74   Temp 98.3  F (36.8  C) (Oral)   Resp 18   Ht 1.676 m (5' 6\")   Wt 81.2 kg (179 lb)   SpO2 99%   BMI 28.89 kg/m   Estimated body mass index is 28.89 kg/m  as calculated from the following:    Height as of this encounter: 1.676 m (5' 6\").    Weight as of this encounter: 81.2 kg (179 lb). Body surface area is 1.94 meters squared.  No Pain (0) Comment: Data Unavailable   No LMP recorded.  Allergies reviewed: Yes  Medications reviewed: Yes    Medications: Medication refills not needed today.  Pharmacy name entered into [facility]:    [facility] [place], [place] - [address] [place] [address] [facility] [place] [facility] [place], [place] - [facility] [address]  [facility] [id number] [place] [address]    Clinical concerns: no        Aminata Hanna MA              "

## 2019-05-02 NOTE — PATIENT INSTRUCTIONS
Individualized Surveillance Plan for women  Hereditary Breast and/or Ovarian Cancer Syndrome   Per NCCN Guidelines Version 3.2019   Recommended screening Test or procedure Last done Next Scheduled    Breast self awareness- starting at age 18. Women should be familiar with their breasts and promptly report changes to their care provider. Periodic, consistent self exam may facilitate breast self awareness. Premenopausal women may find self exams to be most informative when performed at the end of menses. 5/2/2019 Ongoing   Breast screening, starting at age 25 Clinical breast exams every 6 -12 months 5/2/2019 5/2/2019   Breast screening   Age 25-29 Annual breast MRI screening with contrast (or mammogram if MRI is unavailable) or individualized based on family history if a breast cancer diagnosis before age 30 is present.       Breast MRI is performed preferably on day 7-15 of menstrual cycle for premenopausal women.   NA - prophylactic mastectomy in 2016   NA   Breast screening   Age >30-75 years     Annual mammogram (consider tomosynthesis mammogram) and annual screening MRI.     Breast MRI is performed preferably on day 7-15 of menstrual cycle for premenopausal women.    Age>75 years, management should be considered on an individual basis.   NA   NA   Ovarian cancer screening, starting at age 30-35 Consider transvaginal ultrasound and  tests. 4/26/2019- right ovarian cyst     within normal limits October 2019 for both pelvic US and    Recommendation: risk-reducing salpingo-oophorectomy(RRSO), typically between 35 and 40 years old and  upon completion of child bearing.     Because ovarian cancer onset in patients with BRCA2 mutations is on average of 8-10 years later than in patients with BRCA1 mutations, it is reasonable to delay RRSO until 40-45 years in patients with BRCA2 mutations  unless age at diagnosis in the family warrants earlier age for consideration for prophylactic surgery.    Limited data  suggest that there may be a slightly increased risk of serous uterine cancer among women with BRCA1+ pathogenic variants. The provider and the patient should discuss the risks and benefits of a concurrent hysterectomy at the time of RRSO for women with a BRCA1+ pathogenic variant /like pathogenic variant prior to surgery.     Salpingectomy alone is not the standard of care for risk reduction although clinical trials are ongoing.     Discuss option of risk-reducing mastectomy.    Consider risks and benefits of risk reducing agents, such as tamoxifen and raloxifene for breast and ovarian cancer.    Consider a full body skin and eye exam for melanoma screening for both BRCA1+ and BRCA2+.     NOTE: Women with BRCA mutation who are treated for breast cancer should have screening of the remaining breast tissue with annual mammography and breast MRI.    The International Cancer of the Pancreas Screening (CAPS) Consortium recommends that individuals with a BRCA2 mutation who have at least one first-degree relative with pancreatic cancer should undergo initial screening with endoscopic ultrasonography and/or MRI/magnetic resonance cholangiopancreatography.

## 2019-05-02 NOTE — LETTER
Cancer Risk Management  Program Locations    [facility] [place] [facility]  Mailing Address  Cancer Risk Management Program  [facility]  [address]  [place] [address]    New patient appointments  [phone]  May 2, 2019    [facility]essa [facility]spina  [address] [id number]      Dear Tessa,    It was a pleasure meeting with you today.  Below is a copy of my note from our visit, outlining your surveillance plan.      I look forward to seeing you in the future to coordinate your care and reduce your health risks. Please feel free to contact me if you have any questions or concerns.      Oncology Risk Management Consultation:  Date on this visit: 2019    Tessa Ospina returns to [facility] today for a follow up oncology risk management consultation. She requires screening and surveillance to minimize her risk of cancer secondary to having a deleterious BRCA2 mutation.  She is considered to be at high risk for hereditary breast and ovarian cancer.    Primary Physician:  Shameka Tovar MD    History Of Present Illness:  Ms. Ospina is a very pleasant, healthy 33 year old female who presents with family history of breast and ovarian cancer and a personal diagnosis of a BRCA2 mutation.     Genetic testin2015 - POSITIVE for a BRCA2 mutation specifically [id number], the same genetic mutation in her mother. Testing done using single site analysis through [employer].    Pertinent  history:  Menarche at age 13.  First child at age 26.    History of breastfeeding both children. No issues with mastitis.   Ovaries, fallopian tubes and uterus intact.  No history of breast hyperplasia, atypia or malignancy.   2016 - Prophylactic nipple sparing mastectomy with reconstruction (Dr. Nely Mcqueen and   Lam Arora), pathology benign.    Screening history:  11/3/15 - Pelvic ultrasound - probable uterine fibroid in anterior myometrium and L ovarian collapsing follicle.   1/26/16: pelvic ultrasound, L complex ovarian lesion.  3/4/16: Pelvic ultrasound, small L ovarian cyst.   11/16/2016 - Pelvic US, normal. No lesions seen.  6/7/2017 - Pelvic US, normal, 2 cm dominant follicle in R ovary  1/25/2018 - 2 dominant cysts in R ovary, consider short term follow up  3/12/2018 - R cysts resolved, pelvic US normal.  10/31/2018 -Pelvic ultrasound, negative pelvic ultrasound.  4/26/2019 - Pelvic ultrasound, Probable corpus luteal cyst in the right ovary. No other  definite abnormality    3/19/2015 -  - 8  9/17/2015 -  - 17  3/3/2016 -  40 (high)  6/21/2016  - - 23  6/1/2017 -  - 22  1/25/2018 -  - 10  4/26/2019 -  - 13    Review of Systems:  GENERAL: No change in weight, sleep or appetite.  Normal energy.  No fever or chills  EYES: Negative for vision changes or eye problems  ENT: No problems with ears, nose or throat.  No difficulty swallowing.  RESP: No coughing, wheezing or shortness of breath  CV: No chest pains or palpitations  GI: No nausea, vomiting,  heartburn, abdominal pain, diarrhea, constipation or change in bowel habits  GYN: Denies unusual vaginal bleeding, abdominal pain, early satiety.   : No urinary frequency or dysuria, bladder or kidney problems  MUSCULOSKELETAL: No significant muscle or joint pains  NEUROLOGIC: No headaches, numbness, tingling, weakness, problems with balance or coordination  PSYCHIATRIC: No problems with anxiety, depression or mental health  HEME/IMMUNE/ALLERGY: No history of bleeding or clotting problems or anemia.  No allergies or immune system problems  ENDOCRINE: No history of thyroid disease, diabetes or other endocrine disorders  SKIN: No rashes,worrisome lesions or skin problems        Past Medical/Surgical History:  Past Medical History:    Diagnosis Date     BRCA2 positive     has had bilateral mastectomies and reconstruction, still has ovaries     PONV (postoperative nausea and vomiting)     Unsure if this was due to anesthesia and/or vicodin.     Past Surgical History:   Procedure Laterality Date     BREAST BIOPSY, RT/LT Right 2015    benign      SECTION  , 2013     x 2     COSMETIC SURGERY  2016, 2016    Prophylactic bilateral mastectomy and reconstruction     GRAFT FAT TO BREAST Bilateral 2017    Procedure: GRAFT FAT TO BREAST;  Bilateral Breast mastopexy and Fat Grafting from Abdomen;  Surgeon: SUZY Arora MD;  [facility]     MASTECTOMY, NIPPLE SPARING Bilateral 6/15/2016    Procedure: MASTECTOMY, NIPPLE SPARING;  Surgeon: Nely Mcqueen MD;  Location: [facility] OR     RECONSTRUCT BREAST BILATERAL Bilateral 6/15/2016    Procedure: RECONSTRUCT BREAST BILATERAL;  Surgeon: SUZY Arora MD;  Location: [facility] OR     RECONSTRUCT BREAST SECOND STAGE BILATERAL N/A 9/15/2016    Procedure: RECONSTRUCT BREAST SECOND STAGE BILATERAL;  Surgeon: SUZY Arora MD;  Location: [facility] OR     TONSILLECTOMY & ADENOIDECTOMY       [place] [facility] GUIDEWIRE         Allergies:  Allergies as of 2019 - Reviewed 2019   Allergen Reaction Noted     Percocet [oxycodone-acetaminophen] Nausea and Vomiting 2015     Vicodin [hydrocodone-acetaminophen] Nausea and Vomiting 2010     Tramadol Nausea and Vomiting 2018       Current Medications:  Current Outpatient Medications   Medication Sig Dispense Refill     magnesium 250 MG tablet Take 1 tablet by mouth daily          Family History:  Family History   Problem Relation Age of Onset     Breast Cancer Mother 49        BRCA2 genetic mutation     Pre-Diabetes Mother      Other Cancer Maternal Grandfather 60        Prostate and throat     Prostate Cancer Maternal Grandfather      Genetic Disorder Maternal Aunt         BRCA2 genetic  mutation     Cancer Maternal Uncle 70        3 uncles (all 70s and 80s)     Breast Cancer Other 30        maternal cousin; BRCA2 genetic mutation     Other Cancer Other 60        maternal cousin; prostate cancer     Breast Cancer Maternal Aunt 70     Breast Cancer Other 40        maternal cousin     Cancer - colorectal Other 70        Maternal great grandfather     Cancer - colorectal Paternal Uncle 40         in 40s     Breast Cancer Other      Breast Cancer Cousin      Colon Cancer Other      Diabetes Other      Other Cancer Other        Social History:  Social History     Socioeconomic History     Marital status:      Spouse name: Danny     Number of children: 2     Years of education: Not on file     Highest education level: Not on file   Occupational History     Occupation: [employer]     Employer: SELF   Social Needs     Financial resource strain: Not on file     Food insecurity:     Worry: Not on file     Inability: Not on file     Transportation needs:     Medical: Not on file     Non-medical: Not on file   Tobacco Use     Smoking status: Never Smoker     Smokeless tobacco: Never Used   Substance and Sexual Activity     Alcohol use: Yes     Alcohol/week: 0.0 oz     Drug use: No     Sexual activity: Yes     Partners: Male     Birth control/protection: Male Surgical     Comment: vasectomy   Lifestyle     Physical activity:     Days per week: Not on file     Minutes per session: Not on file     Stress: Not on file   Relationships     Social connections:     Talks on phone: Not on file     Gets together: Not on file     Attends Taoist service: Not on file     Active member of club or organization: Not on file     Attends meetings of clubs or organizations: Not on file     Relationship status: Not on file     Intimate partner violence:     Fear of current or ex partner: Not on file     Emotionally abused: Not on file     Physically abused: Not on file     Forced sexual activity: Not on file   Other  "Topics Concern     Parent/sibling w/ CABG, MI or angioplasty before 65F 55M? Not Asked      Service Not Asked     Blood Transfusions Not Asked     Caffeine Concern No     Occupational Exposure No     Hobby Hazards No     Sleep Concern No     Stress Concern Yes     Weight Concern Yes     Comment: in the process of getting back to prepregnancy weight     Special Diet No     Back Care Not Asked     Exercise No     Comment: keeping busy with 2 children under the age of 4; exercising using high intensity interval classes     Bike Helmet Not Asked     Seat Belt Not Asked     Self-Exams Yes   Social History Narrative     Not on file       Physical Exam:  /70 (BP Location: Right arm, Patient Position: Sitting, Cuff Size: Adult Regular)   Pulse 74   Temp 98.3  F (36.8  C) (Oral)   Resp 18   Ht 1.676 m (5' 6\")   Wt 81.2 kg (179 lb)   SpO2 99%   BMI 28.89 kg/m     Physical exam deferred.    Laboratory/Imaging Studies  Results for orders placed or performed during the hospital encounter of 04/26/19      Result Value Ref Range     14 0 - 30 U/mL       ASSESSMENT  Tessa is here today for an annual check and meeting.  We talked recently to discuss the results of her pelvic ultrasound that I had Dr. Debi Can review in clinic last Friday.  It is thought that she continually has normal corpus luteum ovarian follicles.  She will continue to have pelvic ultrasounds every 6 months.  She will schedule her next one for October.  She did make contact twice in [facility] to participate in the with trial, but she states that she did not get through to the correct person and was told she was in eligible for the trial.  We discussed that one option she would have to reduce her risk for ovarian cancer would be to have a preventative salpingectomy, but she declines to do this as she is amenable to paying for only one surgery.  She is asymptomatic today, and her only concern is pain in the nipple " area when bouncing or jogging.  She has discussed this with Dr. Prabhakar and he has suggested removing her native nipple tissue.  She is considering this at this point.      She is aware that current literature shows that women with BRCA 2 mutations have a 45-49% lifetime risk of breast cancer, compared to the general population risk of 12%.  Those with a BRCA2 mutation also bear an approximate 11-18% lifetime risk of ovarian cancer, including primary peritoneal and fallopian tube cancer, compared to the 1-2% lifetime risk within the general population. The mean age diagnosis of ovarian cancer for BRCA1/2 carriers is estimated to be 50.8 years old.  There is also an increased risk of pancreatic cancer and melanoma.Male BRCA2 mutation carriers have a cumulative breast cancer risk of over 6% by age 70 and prostate cancer risk of approximately 15% by age 65.    She will continue to work on health promotion with diet exercise and self-care and proceed with a screening plan below.    INDIVIDUALIZED CANCER RISK MANAGEMENT PLAN:  Individualized Surveillance Plan for women  Hereditary Breast and/or Ovarian Cancer Syndrome   Per NCCN Guidelines Version 3.2019   Recommended screening Test or procedure Last done Next Scheduled    Breast self awareness- starting at age 18. Women should be familiar with their breasts and promptly report changes to their care provider. Periodic, consistent self exam may facilitate breast self awareness. Premenopausal women may find self exams to be most informative when performed at the end of menses. 5/2/2019 Ongoing   Breast screening, starting at age 25 Clinical breast exams every 6 -12 months 5/2/2019 5/2/2019   Breast screening   Age 25-29 Annual breast MRI screening with contrast (or mammogram if MRI is unavailable) or individualized based on family history if a breast cancer diagnosis before age 30 is present.       Breast MRI is performed preferably on day 7-15 of menstrual cycle for  premenopausal women.   NA - prophylactic mastectomy in 2016   NA   Breast screening   Age >30-75 years     Annual mammogram (consider tomosynthesis mammogram) and annual screening MRI.     Breast MRI is performed preferably on day 7-15 of menstrual cycle for premenopausal women.    Age>75 years, management should be considered on an individual basis.   NA   NA   Ovarian cancer screening, starting at age 30-35 Consider transvaginal ultrasound and  tests. 4/26/2019- right ovarian cyst     within normal limits October 2019 for both pelvic US and    Recommendation: risk-reducing salpingo-oophorectomy(RRSO), typically between 35 and 40 years old and  upon completion of child bearing.     Because ovarian cancer onset in patients with BRCA2 mutations is on average of 8-10 years later than in patients with BRCA1 mutations, it is reasonable to delay RRSO until 40-45 years in patients with BRCA2 mutations  unless age at diagnosis in the family warrants earlier age for consideration for prophylactic surgery.    Limited data suggest that there may be a slightly increased risk of serous uterine cancer among women with BRCA1+ pathogenic variants. The provider and the patient should discuss the risks and benefits of a concurrent hysterectomy at the time of RRSO for women with a BRCA1+ pathogenic variant /like pathogenic variant prior to surgery.     Salpingectomy alone is not the standard of care for risk reduction although clinical trials are ongoing.     Discuss option of risk-reducing mastectomy.    Consider risks and benefits of risk reducing agents, such as tamoxifen and raloxifene for breast and ovarian cancer.    Consider a full body skin and eye exam for melanoma screening for both BRCA1+ and BRCA2+.     NOTE: Women with BRCA mutation who are treated for breast cancer should have screening of the remaining breast tissue with annual mammography and breast MRI.    The International Cancer of the Pancreas  "Screening (CAPS) Consortium recommends that individuals with a BRCA2 mutation who have at least one first-degree relative with pancreatic cancer should undergo initial screening with endoscopic ultrasonography and/or MRI/magnetic resonance cholangiopancreatography.       I spent 20 minutes with the patient with greater that 50% of it in counseling and coordinating care as documented above.    Cate Castro, MESHA-CNS, OCN, AGN-BC  Clinical Nurse Specialist  Cancer Risk Management Program  [address]  [place] [address]    phone:  [phone]  Pager: [phone]  fax: [phone]    CC: Shameka Tovar MD                Oncology Rooming Note    May 2, 2019 11:35 AM   Tessa Ospina is a 33 year old female who presents for:    Chief Complaint   Patient presents with     Oncology Clinic Visit     Status post bilateral breast reconstruction     Initial Vitals: /70 (BP Location: Right arm, Patient Position: Sitting, Cuff Size: Adult Regular)   Pulse 74   Temp 98.3  F (36.8  C) (Oral)   Resp 18   Ht 1.676 m (5' 6\")   Wt 81.2 kg (179 lb)   SpO2 99%   BMI 28.89 kg/m    Estimated body mass index is 28.89 kg/m  as calculated from the following:    Height as of this encounter: 1.676 m (5' 6\").    Weight as of this encounter: 81.2 kg (179 lb). Body surface area is 1.94 meters squared.  No Pain (0) Comment: Data Unavailable   No LMP recorded.  Allergies reviewed: Yes  Medications reviewed: Yes    Medications: Medication refills not needed today.  Pharmacy name entered into [facility]:    [facility] [place], [place] - [address] [place] [address] [facility] [place] [facility] [place], [place] - [facility] [address]  [address] [id number] [place] [address]    Clinical concerns: no        Aminata Hanna MA"

## 2019-08-21 ENCOUNTER — TELEPHONE (OUTPATIENT)
Dept: ONCOLOGY | Facility: CLINIC | Age: 34
End: 2019-08-21

## 2019-08-21 DIAGNOSIS — Z15.09 BRCA2 POSITIVE: Primary | ICD-10-CM

## 2019-08-21 DIAGNOSIS — R22.9 LOCALIZED SUPERFICIAL SWELLING, MASS, OR LUMP: ICD-10-CM

## 2019-08-21 DIAGNOSIS — Z90.13 H/O BILATERAL MASTECTOMY: ICD-10-CM

## 2019-08-21 DIAGNOSIS — Z15.01 BRCA2 POSITIVE: Primary | ICD-10-CM

## 2019-08-27 ENCOUNTER — HOSPITAL ENCOUNTER (OUTPATIENT)
Dept: MAMMOGRAPHY | Facility: CLINIC | Age: 34
Discharge: HOME | End: 2019-08-27
Attending: CLINICAL NURSE SPECIALIST | Admitting: CLINICAL NURSE SPECIALIST
Payer: COMMERCIAL

## 2019-08-27 ENCOUNTER — TELEPHONE (OUTPATIENT)
Dept: ONCOLOGY | Facility: CLINIC | Age: 34
End: 2019-08-27

## 2019-08-27 DIAGNOSIS — R22.9 LOCALIZED SUPERFICIAL SWELLING, MASS, OR LUMP: ICD-10-CM

## 2019-08-27 DIAGNOSIS — Z90.13 H/O BILATERAL MASTECTOMY: ICD-10-CM

## 2019-08-27 DIAGNOSIS — Z15.01 BRCA2 POSITIVE: ICD-10-CM

## 2019-08-27 DIAGNOSIS — Z15.09 BRCA2 POSITIVE: ICD-10-CM

## 2019-08-27 PROCEDURE — 76882 US LMTD JT/FCL EVL NVASC XTR: CPT | Mod: RT

## 2019-09-23 NOTE — PROGRESS NOTES
Tessa is a 33 year old  female who presents for annual exam.     Besides routine health maintenance, she has no other health concerns today .    HPI:  The patient's PCP is  Shameka Tovar MD.   Patient is a BRCA  2 carrier  She already had bilateral mastectomies and reconstruction  Plan has been to do risk reducing BILATERAL SALPINGO-OOPHORECTOMY between ages of 34-40.   She is not on ocp which was suggested by oncology at her choice  She follows with oncology  Has q 6 mo pelvic ultrasound and  thru oncology    She is still undecided about having BILATERAL SALPINGO-OOPHORECTOMY  Periods are getting heavy, passes large clot first day  She doesn't know if they would allow estrogen replacement after bso  No one in family has had ovarian cancer with their Brca gene positive status    2 kids, by c section   had vasectomy      GYNECOLOGIC HISTORY:    Patient's last menstrual period was 09/10/2019.  Her current contraception method is: vasectomy.  She  reports that she has never smoked. She has never used smokeless tobacco.    Patient is sexually active.  STD testing offered?  Declined  Last PHQ-9 score on record =   PHQ-9 SCORE 2019   PHQ-9 Total Score 0     Last GAD7 score on record =   BRITT-7 SCORE 2019   Total Score -   Total Score 0     Alcohol Score = 2    HEALTH MAINTENANCE:  Cholesterol:   Cholesterol   Date Value Ref Range Status   2018 154 <200 mg/dL Final   2017 156 <200 mg/dL Final      Recent Labs   Lab Test 18  1304 17  0922   CHOL 154 156   HDL 60 62   LDL 79 74   TRIG 75 101     Last Mammo: 3/14/16, Result: had bilateral mastectomies, Next Mammo: NA   Pap:   Lab Results   Component Value Date    PAP NIL 2016 WNL HPV (-)neg  Colonoscopy:  NA, Result: not applicable, Next Colonoscopy: NA years.  Dexa:  NA    Health maintenance updated:  yes    HISTORY:  OB History    Para Term  AB Living   2 2 2 0 0 2   SAB TAB Ectopic  Multiple Live Births   0 0 0 0 2      # Outcome Date GA Lbr Juan J/2nd Weight Sex Delivery Anes PTL Lv   2 Term 2013    F CS-LTranv   [facility]ARY      Birth Comments: gestational diabetes   1 Term 11    M CS-LTranv   CHARY       Patient Active Problem List   Diagnosis     BRCA2 gene mutation positive     Hereditary breast and ovarian cancer syndrome associated with mutation in BRCA2 gene     S/P bilateral mastectomy     Status post bilateral breast reconstruction     White classification A2 gestational diabetes mellitus (GDM)     ACL (anterior cruciate ligament) rupture     Past Surgical History:   Procedure Laterality Date     BREAST BIOPSY, RT/LT Right 2015    benign      SECTION  , 2013     x 2     COSMETIC SURGERY  2016, 2016    Prophylactic bilateral mastectomy and reconstruction     GRAFT FAT TO BREAST Bilateral 2017    Procedure: GRAFT FAT TO BREAST;  Bilateral Breast mastopexy and Fat Grafting from Abdomen;  Surgeon: SUZY Arora MD;  Location: [facility]     MASTECTOMY, NIPPLE SPARING Bilateral 6/15/2016    Procedure: MASTECTOMY, NIPPLE SPARING;  Surgeon: Nely Mcqueen MD;  Location: [facility] OR     RECONSTRUCT BREAST BILATERAL Bilateral 6/15/2016    Procedure: RECONSTRUCT BREAST BILATERAL;  Surgeon: SUZY Arora MD;  Location: [facility] OR     RECONSTRUCT BREAST SECOND STAGE BILATERAL N/A 9/15/2016    Procedure: RECONSTRUCT BREAST SECOND STAGE BILATERAL;  Surgeon: SUZY Arora MD;  Location: [facility] OR     TONSILLECTOMY & ADENOIDECTOMY       [facility]        Social History     Tobacco Use     Smoking status: Never Smoker     Smokeless tobacco: Never Used   Substance Use Topics     Alcohol use: Yes     Alcohol/week: 0.0 standard drinks      Problem (# of Occurrences) Relation (Name,Age of Onset)    Breast Cancer (6) Mother (Maryann Waters, 49): BRCA2 genetic mutation, Other (30): maternal cousin; BRCA2 genetic mutation, Maternal Aunt  "(70), Other (40): maternal cousin, Other (Great aunt), Cousin (Cousin)    Cancer (1) Maternal Uncle (70): 3 uncles (all 70s and 80s)    Cancer - colorectal (2) Other (70): Maternal great grandfather, Paternal Uncle (40):  in 40s    Colon Cancer (1) Other (Uncle)    Diabetes (1) Other (Uncle)    Genetic Disorder (1) Maternal Aunt: BRCA2 genetic mutation    Other Cancer (3) Maternal Grandfather (Max Villar, 60): Prostate and throat, Other (60): maternal cousin; prostate cancer, Other (4 Great Uncles)    Pre-Diabetes (1) Mother (Maryann Waters)    Prostate Cancer (1) Maternal Grandfather (Max Villar)            Current Outpatient Medications   Medication Sig     magnesium 250 MG tablet Take 1 tablet by mouth daily     No current facility-administered medications for this visit.      Allergies   Allergen Reactions     Percocet [Oxycodone-Acetaminophen] Nausea and Vomiting     Vicodin [Hydrocodone-Acetaminophen] Nausea and Vomiting     Sensitivity to narcotics.     Tramadol Nausea and Vomiting       Past medical, surgical, social and family histories were reviewed and updated in EPIC.    ROS:   12 point review of systems negative other than symptoms noted below.    EXAM:  /62   Ht 1.689 m (5' 6.5\")   Wt 79.8 kg (176 lb)   LMP 09/10/2019   Breastfeeding? No   BMI 27.98 kg/m     BMI: Body mass index is 27.98 kg/m .    PHYSICAL EXAM:  Constitutional:  Appearance: Well nourished, well developed, alert, in no acute distress  Neck:  Lymph Nodes:  No lymphadenopathy present    Thyroid:  Gland size normal, nontender, no nodules or masses present  on palpation  Chest:  Respiratory Effort:  Breathing unlabored  Cardiovascular:    Heart: Auscultation:  Regular rate, normal rhythm, no murmurs present  Breasts: Inspection of Breasts:  No lymphadenopathy present., Palpation of Breasts and Axillae:  No masses present on palpation, no breast tenderness., Axillary Lymph Nodes:  No lymphadenopathy present. and " No nodularity, asymmetry or nipple discharge bilaterally.  Gastrointestinal:   Abdominal Examination:  Abdomen nontender to palpation, tone normal without rigidity or guarding, no masses present, umbilicus without lesions   Liver and Spleen:  No hepatomegaly present, liver nontender to palpation    Hernias:  No hernias present  Lymphatic: Lymph Nodes:  No other lymphadenopathy present  Skin:  General Inspection:  No rashes present, no lesions present, no areas of  discoloration    Genitalia and Groin:  No rashes present, no lesions present, no areas of  discoloration, no masses present  Neurologic/Psychiatric:    Mental Status:  Oriented X3     Pelvic Exam:  External Genitalia:     Normal appearance for age, no discharge present, no tenderness present, no inflammatory lesions present, color normal  Vagina:     Normal vaginal vault without central or paravaginal defects, no discharge present, no inflammatory lesions present, no masses present  Bladder:     Nontender to palpation  Urethra:   Urethral Body:  Urethra palpation normal, urethra structural support normal   Urethral Meatus:  No erythema or lesions present  Cervix:     Appearance healthy, no lesions present, nontender to palpation, no bleeding present  Uterus:     Uterus: firm, normal sized and nontender, midplane in position.   Adnexa:     No adnexal tenderness present, no adnexal masses present  Perineum:     Perineum within normal limits, no evidence of trauma, no rashes or skin lesions present  Anus:     Anus within normal limits, no hemorrhoids present  Inguinal Lymph Nodes:     No lymphadenopathy present  Pubic Hair:     Normal pubic hair distribution for age  Genitalia and Groin:     No rashes present, no lesions present, no areas of discoloration, no masses present      COUNSELING:   Reviewed preventive health counseling, as reflected in patient instructions       cancer family syndrome    BMI: Body mass index is 27.98 kg/m .  Weight management plan:  Discussed healthy diet and exercise guidelines    ASSESSMENT:  33 year old female with satisfactory annual exam.    ICD-10-CM    1. Encounter for gynecological examination without abnormal finding Z01.419    2. BRCA2 gene mutation positive Z15.01     Z15.09        PLAN:  Discussed that periods may get heavier in her 40s if she hasn't had BILATERAL SALPINGO-OOPHORECTOMY  She is going to talk with her oncology provider again to get their opinion on whether estrogen replacement is an option for awhile after  Risk reducing bso.   Pap and hpv done at patient request, we have been doing every 3 yrs.  Patient has declined ocp to suppress ovulation.       Jailyn Palacio MD

## 2019-09-24 ENCOUNTER — OFFICE VISIT (OUTPATIENT)
Dept: OBGYN | Facility: CLINIC | Age: 34
End: 2019-09-24
Payer: COMMERCIAL

## 2019-09-24 VITALS
BODY MASS INDEX: 27.62 KG/M2 | WEIGHT: 176 LBS | DIASTOLIC BLOOD PRESSURE: 62 MMHG | HEIGHT: 67 IN | SYSTOLIC BLOOD PRESSURE: 114 MMHG

## 2019-09-24 DIAGNOSIS — Z15.09 BRCA2 GENE MUTATION POSITIVE: ICD-10-CM

## 2019-09-24 DIAGNOSIS — Z15.01 BRCA2 GENE MUTATION POSITIVE: ICD-10-CM

## 2019-09-24 DIAGNOSIS — Z01.419 ENCOUNTER FOR GYNECOLOGICAL EXAMINATION WITHOUT ABNORMAL FINDING: Primary | ICD-10-CM

## 2019-09-24 PROCEDURE — 87624 HPV HI-RISK TYP POOLED RSLT: CPT | Performed by: OBSTETRICS & GYNECOLOGY

## 2019-09-24 PROCEDURE — 99395 PREV VISIT EST AGE 18-39: CPT | Performed by: OBSTETRICS & GYNECOLOGY

## 2019-09-24 PROCEDURE — G0145 SCR C/V CYTO,THINLAYER,RESCR: HCPCS | Performed by: OBSTETRICS & GYNECOLOGY

## 2019-09-24 ASSESSMENT — ANXIETY QUESTIONNAIRES
IF YOU CHECKED OFF ANY PROBLEMS ON THIS QUESTIONNAIRE, HOW DIFFICULT HAVE THESE PROBLEMS MADE IT FOR YOU TO DO YOUR WORK, TAKE CARE OF THINGS AT HOME, OR GET ALONG WITH OTHER PEOPLE: NOT DIFFICULT AT ALL
7. FEELING AFRAID AS IF SOMETHING AWFUL MIGHT HAPPEN: NOT AT ALL
2. NOT BEING ABLE TO STOP OR CONTROL WORRYING: NOT AT ALL
6. BECOMING EASILY ANNOYED OR IRRITABLE: NOT AT ALL
5. BEING SO RESTLESS THAT IT IS HARD TO SIT STILL: NOT AT ALL
1. FEELING NERVOUS, ANXIOUS, OR ON EDGE: NOT AT ALL
GAD7 TOTAL SCORE: 0
3. WORRYING TOO MUCH ABOUT DIFFERENT THINGS: NOT AT ALL

## 2019-09-24 ASSESSMENT — PATIENT HEALTH QUESTIONNAIRE - PHQ9
5. POOR APPETITE OR OVEREATING: NOT AT ALL
SUM OF ALL RESPONSES TO PHQ QUESTIONS 1-9: 0

## 2019-09-24 ASSESSMENT — MIFFLIN-ST. JEOR: SCORE: 1523.02

## 2019-09-25 ASSESSMENT — ANXIETY QUESTIONNAIRES: GAD7 TOTAL SCORE: 0

## 2019-09-27 LAB
COPATH REPORT: NORMAL
PAP: NORMAL

## 2019-09-30 LAB
FINAL DIAGNOSIS: NORMAL
HPV HR 12 DNA CVX QL NAA+PROBE: NEGATIVE
HPV16 DNA SPEC QL NAA+PROBE: NEGATIVE
HPV18 DNA SPEC QL NAA+PROBE: NEGATIVE
SPECIMEN DESCRIPTION: NORMAL
SPECIMEN SOURCE CVX/VAG CYTO: NORMAL

## 2019-11-08 ENCOUNTER — HEALTH MAINTENANCE LETTER (OUTPATIENT)
Age: 34
End: 2019-11-08

## 2019-12-10 ENCOUNTER — OFFICE VISIT (OUTPATIENT)
Dept: PLASTIC SURGERY | Facility: CLINIC | Age: 34
End: 2019-12-10
Attending: PLASTIC SURGERY
Payer: COMMERCIAL

## 2019-12-10 ENCOUNTER — TELEPHONE (OUTPATIENT)
Dept: SURGERY | Facility: CLINIC | Age: 34
End: 2019-12-10

## 2019-12-10 VITALS
SYSTOLIC BLOOD PRESSURE: 117 MMHG | OXYGEN SATURATION: 98 % | TEMPERATURE: 98.4 F | DIASTOLIC BLOOD PRESSURE: 74 MMHG | WEIGHT: 175.8 LBS | HEART RATE: 85 BPM | RESPIRATION RATE: 14 BRPM | HEIGHT: 66 IN | BODY MASS INDEX: 28.25 KG/M2

## 2019-12-10 DIAGNOSIS — Z98.890 STATUS POST BILATERAL BREAST RECONSTRUCTION: Primary | ICD-10-CM

## 2019-12-10 PROCEDURE — G0463 HOSPITAL OUTPT CLINIC VISIT: HCPCS | Mod: ZF

## 2019-12-10 ASSESSMENT — MIFFLIN-ST. JEOR: SCORE: 1517.04

## 2019-12-10 ASSESSMENT — PAIN SCALES - GENERAL: PAINLEVEL: NO PAIN (0)

## 2019-12-10 NOTE — PROGRESS NOTES
FOLLOWUP VISIT      PRESENTING COMPLAINT:  Postoperative visit, status post bilateral breast reconstruction for bilateral nipple-sparing mastectomy for high risk for breast cancer, last surgery done 11/2017.      HISTORY OF PRESENTING COMPLAINT:  Ms. Ospina is 34 years old.  She is a couple years out from her procedure.  Overall happy with the results except that she has very sensitive nipples.  She would like them removed.      PHYSICAL EXAMINATION:  Vital signs are stable.  She is afebrile, in no obvious distress.  Breasts are symmetric, aesthetic.  Her nipples are very sensitive to the touch.      ASSESSMENT AND PLAN:  Based on above findings, a diagnosis of bilateral breast reconstruction was made.  I explained to the patient that options include doing nothing versus steroid injection versus removal.  She wants to go with the removal.  This can be done under local anesthesia in my clinic at [place].  All risks, benefits and alternatives of the procedure including pain, infection, bleeding, scarring, asymmetry, seromas, hematomas, wound breakdown, wound dehiscence, implant exposure, infections, unaesthetic result, DVT, PE, MI, CVA, and death.  She understood them all and wants to proceed.  We will schedule her.

## 2019-12-10 NOTE — NURSING NOTE
"Oncology Rooming Note    December 10, 2019 12:15 PM   Tessa Ospina is a 34 year old female who presents for:    Chief Complaint   Patient presents with     Oncology Clinic Visit     [facility] POST OP- BREAST CA     Initial Vitals: /74 (BP Location: Right arm, Patient Position: Chair, Cuff Size: Adult Regular)   Pulse 85   Temp 98.4  F (36.9  C) (Oral)   Resp 14   Ht 1.689 m (5' 6.5\")   Wt 79.7 kg (175 lb 12.8 oz)   SpO2 98%   BMI 27.95 kg/m   Estimated body mass index is 27.95 kg/m  as calculated from the following:    Height as of this encounter: 1.689 m (5' 6.5\").    Weight as of this encounter: 79.7 kg (175 lb 12.8 oz). Body surface area is 1.93 meters squared.  No Pain (0) Comment: Data Unavailable   No LMP recorded.  Allergies reviewed: Yes  Medications reviewed: Yes    Medications: Medication refills not needed today.  Pharmacy name entered into [facility]:    [facility] [place], [place] - [address] [place] [address] [facility] [place], [place] - [facility] [address]  [facility] [id number] [place] [address]    Clinical concerns: No new concerns. Maite was notified.      José Miguel Mays LPN            "

## 2019-12-10 NOTE — TELEPHONE ENCOUNTER
Pt called, no answer. VM Id's pt. Left detailed message with reason for call and  for pt to call the [facility] clinic back at [phone].    Dr. Arora's note has not been translated from his voice documentation. After listening to the report, patient is interested to have bilateral nipples removed. Dr. Arora would like to do it in clinic under local numbing.     Please schedule for one hour on an open procedure slot.    Fiorella Bernabe LPN

## 2019-12-10 NOTE — LETTER
12/10/2019       RE: Tessa Ospina  [address]  [facility] [address]     Dear Colleague,    Thank you for referring your patient, Tessa Ospina, to the [facility] at [facility]. Please see a copy of my visit note below.    FOLLOWUP VISIT      PRESENTING COMPLAINT:  Postoperative visit, status post bilateral breast reconstruction for bilateral nipple-sparing mastectomy for high risk for breast cancer, last surgery done 11/2017.      HISTORY OF PRESENTING COMPLAINT:  Ms. Ospina is 34 years old.  She is a couple years out from her procedure.  Overall happy with the results except that she has very sensitive nipples.  She would like them removed.      PHYSICAL EXAMINATION:  Vital signs are stable.  She is afebrile, in no obvious distress.  Breasts are symmetric, aesthetic.  Her nipples are very sensitive to the touch.      ASSESSMENT AND PLAN:  Based on above findings, a diagnosis of bilateral breast reconstruction was made.  I explained to the patient that options include doing nothing versus steroid injection versus removal.  She wants to go with the removal.  This can be done under local anesthesia in my clinic at [place].  All risks, benefits and alternatives of the procedure including pain, infection, bleeding, scarring, asymmetry, seromas, hematomas, wound breakdown, wound dehiscence, implant exposure, infections, unaesthetic result, DVT, PE, MI, CVA, and death.  She understood them all and wants to proceed.  We will schedule her.     Again, thank you for allowing me to participate in the care of your patient.      Sincerely,    SUZY Arora MD

## 2019-12-10 NOTE — TELEPHONE ENCOUNTER
----- Message from SUZY Arora MD sent at 12/10/2019  4:10 PM CST -----  Regarding: Procedure  Hi C/J,    Let's give her a procedure date for clinic    Hank Fritz

## 2019-12-11 ENCOUNTER — TELEPHONE (OUTPATIENT)
Dept: SURGERY | Facility: CLINIC | Age: 34
End: 2019-12-11

## 2019-12-11 NOTE — TELEPHONE ENCOUNTER
Pt called and states that she is planning on going on 4 trips this winter and thinks that maybe she will wait to schedule in April. RN advised pt to call back end of February to schedule appt in April. Pt verbalized understanding and states that she will make a note to call back..Pt had no further questions or concerns.SUZY Boo RN, MD Eastman, Colleen E, RN; Fiorella Bernabe LPN             Hi C/J,     Let's give her a procedure date for clinic     Thanks     Lam

## 2019-12-12 NOTE — TELEPHONE ENCOUNTER
Hazel Moreno RN           12/11/19 10:39 AM   Note      Pt called and states that she is planning on going on 4 trips this winter and thinks that maybe she will wait to schedule in April. RN advised pt to call back end of February to schedule appt in April. Pt verbalized understanding and states that she will make a note to call back..Pt had no further questions or concerns.Hazel Moreno RN        Closing encounter.    Fiorella Bernabe LPN

## 2020-01-06 ENCOUNTER — HOSPITAL ENCOUNTER (OUTPATIENT)
Dept: ULTRASOUND IMAGING | Facility: CLINIC | Age: 35
Discharge: HOME | End: 2020-01-06
Attending: CLINICAL NURSE SPECIALIST | Admitting: CLINICAL NURSE SPECIALIST
Payer: COMMERCIAL

## 2020-01-06 DIAGNOSIS — Z91.89 AT RISK FOR CANCER: ICD-10-CM

## 2020-01-06 DIAGNOSIS — Z15.09 BRCA2 POSITIVE: ICD-10-CM

## 2020-01-06 DIAGNOSIS — Z15.01 BRCA2 POSITIVE: ICD-10-CM

## 2020-01-06 PROCEDURE — 76856 US EXAM PELVIC COMPLETE: CPT

## 2020-03-17 ENCOUNTER — VIRTUAL VISIT (OUTPATIENT)
Dept: FAMILY MEDICINE | Facility: OTHER | Age: 35
End: 2020-03-17

## 2020-03-17 NOTE — PROGRESS NOTES
"Date: 2020 08:09:00  Clinician: MESHA Rodriguez  Clinician NPI: [id number]  Patient: Tessa Ospina  Patient : 1985  Patient Address: [address] [place] [address]  Patient Phone: [phone]  Visit Protocol: URI  Patient Summary:  Tessa is a 34 year old ( : 1985 ) female who initiated a Visit for COVID-19 (Coronavirus) evaluation and screening. When asked the question \"Please sign me up to receive news, health information and promotions. \", Tessa responded \"No\".    Tessa states her symptoms started gradually 3-6 days ago. After her symptoms started, they improved and then got worse again.   Her symptoms consist of a sore throat, a cough, ear pain, malaise, a headache, rhinitis, and enlarged lymph nodes. She is experiencing difficulty breathing due to nasal congestion but she is not short of breath.   Symptom details     Nasal secretions: The color of her mucus is clear and yellow.    Cough: Tessa coughs every 5-10 minutes and her cough is more bothersome at night. Phlegm comes into her throat when she coughs. She does not believe her cough is caused by post-nasal drip. The color of the phlegm is yellow and clear.     Sore throat: Tessa reports having moderate throat pain (4-6 on a 10 point pain scale), does not have exudate on her tonsils, and can swallow liquids. The lymph nodes in her neck are enlarged. A rash has not appeared on the skin since the sore throat started.     Headache: She states the headache is mild (1-3 on a 10 point pain scale).      Tessa denies having wheezing, nasal congestion, teeth pain, fever, chills, facial pain or pressure, and myalgias. She also denies taking antibiotic medication for the symptoms, having a sinus infection within the past year, and having recent facial or sinus surgery in the past 60 days.   Precipitating events  Within the past week, Tessa has not been exposed to someone with strep throat. She has not recently been exposed to " someone with influenza. Tessa has been in close contact with the following high risk individuals: people with asthma, heart disease or diabetes.   Pertinent COVID-19 (Coronavirus) information  Tessa has not traveled internationally or to the areas where COVID-19 (Coronavirus) is widespread in the last 14 days before the start of her symptoms.   Tessa has not had a close contact with a laboratory-confirmed COVID-19 patient within 14 days of symptom onset. She also has not had a close contact with a suspected COVID-19 patient within 14 days of symptom onset.   Tessa is not a healthcare worker and does not work in a healthcare facility.   Pertinent medical history  Tessa typically gets a yeast infection when she takes antibiotics. She has not used fluconazole (Diflucan) to treat previous yeast infections.   Tessa does not need a return to work/school note.   Weight: 175 lbs   Tessa does not smoke or use smokeless tobacco.   She denies pregnancy and denies breastfeeding. She has menstruated in the past month.   Weight: 175 lbs    MEDICATIONS: No current medications, ALLERGIES: Vicodin, codeine, Toradol  Clinician Response:  Dear Tessa,   Based on the information you have provided, you do have symptoms that are consistent with Coronavirus (COVID-19).  The coronavirus causes mild to severe respiratory illness with the most common symptoms including fever, cough and difficulty breathing. Unfortunately, many viruses cause similar symptoms and it can be difficult to distinguish between viruses, especially in mild cases, so we are presuming that anyone with cough or fever has coronavirus at this time.  Coronavirus/COVID-19 has reached the point of community spread in [place], meaning that we are finding the virus in people with no known exposure risk for trina the virus. Given the increasing commonness of coronavirus in the community we are focusing testing on those people who are in the hospital or who have severe  respiratory symptoms in the Emergency Room.  For everyone else who has cough or fever, you should assume you are infected with coronavirus. Accordingly, you should self-quarantine for fourteen days from the first day your symptoms started. You should call if you find increasing shortness of breath, wheezing or sustained fever above 101.5. If you are significantly short of breath or experience chest pain you should call 911 or report to the nearest emergency department for urgent evaluation.    Isolate yourself at home.   Do Not allow any visitors  Do Not go to work or school  Do Not go to Worship,  centers, shopping, or other public places.  Do Not shake hands.  Avoid close contact with others (hugging, kissing).   Protect Others:    Cover Your Mouth and Nose with a mask, disposable tissue or wash cloth to avoid spreading germs to others.  Wash your hands and face frequently with soap and water.   If you develop significant shortness of breath that prevents you from doing normal activities, please call 911 or proceed to the nearest emergency room and alert them immediately that you have been in self-isolation for possible coronavirus.   For more information about COVID19 and options for caring for yourself at home, please visit the CDC website at https://www.cdc.gov/coronavirus/2019-ncov/about/steps-when-sick.htmlFor more options for care at [facility], please visit our website at [email or web address]     COVID-19 (Coronavirus) General Information  With the increase in the number of COVID-19 (Coronavirus) cases, we understand you may have some questions. Below is some helpful information on COVID-19 (Coronavirus).  How can I protect myself and others from the COVID-19 (Coronavirus)?  Because there is currently no vaccine to prevent infection, the best way to protect yourself is to avoid being exposed to this virus. Put distance between yourself and other people if COVID-19  (Coronavirus) is spreading in your community. The virus is thought to spread mainly from person-to-person.     Between people who are in close contact with one another (within about 6 about) for prolonged period (10 minutes or longer).    Through respiratory droplets produced when an infected person coughs or sneezes.     The CDC recommends the following additional steps to protect yourself and others:     Wash your hands often with soap and water for at least 20 seconds, especially after blowing your nose, coughing, or sneezing; going to the bathroom; and before eating or preparing food.  Use an alcohol-based hand  that contains at least 60 percent alcohol if soap and water are not available.        Avoid touching your eyes, nose and mouth with unwashed hands.    Avoid close contact with people who are sick.    Stay home when you are sick.    Cover your cough or sneeze with a tissue, then throw the tissue in the trash.    Clean and disinfect frequently touched objects and surfaces.     You can help stop COVID-19 (Coronavirus) by knowing the signs and symptoms:     Fever    Cough    Shortness of breath     Contact your healthcare provider if   Develop symptoms   AND   Have been in close contact with a person known to have COVID-19 (Coronavirus) or live in or have recently traveled from an area with ongoing spread of COVID-19 (Coronavirus). Call ahead before you go to a doctor's office or emergency room. Tell them about your recent travel and your symptoms.   For the most up to date information, visit the CDC's website.  Steps to help prevent the spread of COVID-19 (Coronavirus) if you are sick  If you are sick with COVID-19 (Coronavirus) or suspect you are infected with the virus that causes COVID-19 (Coronavirus), follow the steps below to help prevent the disease from spreading&nbsp;to people in your home and community.     Stay home except to get medical care. Home isolation may be started in  "consultation with your healthcare clinician.    Separate yourself from other people and animals in your home.    Call ahead before visiting your doctor if you have a medical appointment.    Wear a facemask when you are around other people.    Cover your cough and sneezes.    Clean your hands often.    Avoid sharing personal household items.    Clean and disinfect frequently touched objects and surfaces everyday.    You will need to have someone drop off medications or household supplies (if needed) at your house without coming inside or in contact with you or others living in your house.    Monitor your symptoms and seek prompt medical care if your illness is worsening (e.g. Difficulty breathing).    Discontinue home isolation only in consultation with your healthcare provider.     For more detailed and up to date information on what to do if you are sick, visit this link: What to Do If You Are Sick With Coronavirus Disease 2019 (COVID-19).  Do I need to be tested for COVID-19 (Coronavirus)?     At this time, the limited number of tests available are controlled by the state and local health departments and are being reserved for more seriously ill patients, those with known exposure to confirmed patients, and those with recent travel (within 14 days) to countries with high rates of COVID-19 (Coronavirus).    Decisions on which patients receive testing will be based on the local spread of COVID-19 (Coronavirus) as well as the symptoms. Your healthcare provider will make the final decision on whether you should be tested.    In the meantime, if you have concerns that you may have been exposed, it is reasonable to practice \"social distancing.\"&nbsp; If you are ill with a cold or flu-like illness, please monitor your symptoms and reach out to your healthcare provider if your symptoms worsen.    For more up to date information, visit this link: COVID-19 (Coronavirus) Frequently Asked Questions and Answers.    "   Diagnosis: Coronavirus infection, unspecified  Diagnosis ICD: B34.2

## 2020-04-15 ENCOUNTER — TELEPHONE (OUTPATIENT)
Dept: ONCOLOGY | Facility: CLINIC | Age: 35
End: 2020-04-15

## 2020-04-15 ENCOUNTER — MYC MEDICAL ADVICE (OUTPATIENT)
Dept: OBGYN | Facility: CLINIC | Age: 35
End: 2020-04-15

## 2020-04-15 NOTE — TELEPHONE ENCOUNTER
Routing pt [employer] message to provider to advise.    Anna Carrillo RN on 4/15/2020 at 2:08 PM

## 2020-04-15 NOTE — TELEPHONE ENCOUNTER
I don't think we need to rush into an evaluation     Her discomfort hasn't been present very long  She still has ovaries and isn't on anything to suppress ovulation so every month when she ovulates she will get a cyst where the egg forms and sometimes those hurt.   Her last ultrasound looked pretty normal for her age and reproductive status.  The cyst mentioned is to be expected in premenopausal woman.     If discomfort persists after next menses is over, then I would do another pelvic ultrasound. That allows time for an ovulation cyst to resolve since they come and go every month.  Not on a specific pattern or side.     Pain is a not a typical sign of ovarian malignancies. Pain is related to ovulation or blood in an ovarian cyst most of the time.

## 2020-07-29 ENCOUNTER — HOSPITAL ENCOUNTER (OUTPATIENT)
Dept: ULTRASOUND IMAGING | Facility: CLINIC | Age: 35
Discharge: HOME | End: 2020-07-29
Attending: CLINICAL NURSE SPECIALIST | Admitting: CLINICAL NURSE SPECIALIST
Payer: COMMERCIAL

## 2020-07-29 DIAGNOSIS — Z15.01 BRCA2 POSITIVE: ICD-10-CM

## 2020-07-29 DIAGNOSIS — Z15.09 BRCA2 POSITIVE: ICD-10-CM

## 2020-07-29 DIAGNOSIS — Z91.89 AT RISK FOR CANCER: ICD-10-CM

## 2020-07-29 PROCEDURE — 76856 US EXAM PELVIC COMPLETE: CPT

## 2020-09-08 DIAGNOSIS — Z15.09 BRCA2 POSITIVE: Primary | ICD-10-CM

## 2020-09-08 DIAGNOSIS — Z91.89 AT RISK FOR CANCER: ICD-10-CM

## 2020-09-08 DIAGNOSIS — Z15.01 BRCA2 POSITIVE: Primary | ICD-10-CM

## 2020-09-18 ENCOUNTER — OFFICE VISIT (OUTPATIENT)
Dept: FAMILY MEDICINE | Facility: CLINIC | Age: 35
End: 2020-09-18
Payer: COMMERCIAL

## 2020-09-18 VITALS — DIASTOLIC BLOOD PRESSURE: 64 MMHG | SYSTOLIC BLOOD PRESSURE: 110 MMHG

## 2020-09-18 DIAGNOSIS — D22.9 MULTIPLE BENIGN NEVI: ICD-10-CM

## 2020-09-18 DIAGNOSIS — Z15.01 BRCA2 POSITIVE: ICD-10-CM

## 2020-09-18 DIAGNOSIS — D48.5 NEOPLASM OF UNCERTAIN BEHAVIOR OF SKIN: Primary | ICD-10-CM

## 2020-09-18 DIAGNOSIS — L81.4 LENTIGINES: ICD-10-CM

## 2020-09-18 DIAGNOSIS — Z15.09 BRCA2 POSITIVE: ICD-10-CM

## 2020-09-18 PROCEDURE — 88305 TISSUE EXAM BY PATHOLOGIST: CPT | Mod: TC | Performed by: PHYSICIAN ASSISTANT

## 2020-09-18 PROCEDURE — 11102 TANGNTL BX SKIN SINGLE LES: CPT | Performed by: PHYSICIAN ASSISTANT

## 2020-09-18 PROCEDURE — 11103 TANGNTL BX SKIN EA SEP/ADDL: CPT | Performed by: PHYSICIAN ASSISTANT

## 2020-09-18 PROCEDURE — 99214 OFFICE O/P EST MOD 30 MIN: CPT | Mod: 25 | Performed by: PHYSICIAN ASSISTANT

## 2020-09-18 NOTE — PATIENT INSTRUCTIONS
Proper skin care from [facility]:    -Eliminate harsh soaps as they strip the natural oils from the skin, often resulting in dry itchy skin ( i.e. Dial, Zest, Flower Spring)  -Use mild soaps such as Cetaphil or Dove Sensitive Skin in the shower. You do not need to use soap on arms, legs, and trunk every time you shower unless visibly soiled.   -Avoid hot or cold showers.  -After showering, lightly dry off and apply moisturizing within 2-3 minutes. This will help trap moisture in the skin.   -Aggressive use of a moisturizer at least 1-2 times a day to the entire body (including -Vanicream, Cetaphil, Aquaphor or Cerave) and moisturize hands after every washing.  -We recommend using moisturizers that come in a tub that needs to be scooped out, not a pump. This has more of an oil base. It will hold moisture in your skin much better than a water base moisturizer. The above recommended are non-pore clogging.      Wear a sunscreen with at least SPF 30 on your face, ears, neck and V of the chest daily. Wear sunscreen on other areas of the body if those areas are exposed to the sun throughout the day. Sunscreens can contain physical and/or chemical blockers. Physical blockers are less likely to clog pores, these include zinc oxide and titanium dioxide. Reapply every two hour and after swimming. Sunscreen examples include Neutrogena, CeraVe, Blue Lizard, Elta MD and many others.    UV radiation  UVA radiation remains constant throughout the day and throughout the year. It is a longer wavelength than UVB and therefore penetrates deeper into the skin leading to immediate and delayed tanning, photoaging, and skin cancer. 70-80% of UVA and UVB radiation occurs between the hours of 10am-2pm.  UVB radiation  UVB radiation causes the most harmful effects and is more significant during the summer months. However, snow and ice can reflect UVB radiation leading to skin damage during the winter months as well. UVB radiation is  responsible for tanning, burning, inflammation, delayed erythema (pinkness), pigmentation (brown spots), and skin cancer.     I recommend self monthly full body exams and yearly full body exams with a dermatology provider. If you develop a new or changing lesion please follow up for examination. Most skin cancers are pink and scaly or pink and pearly. However, we do see blue/brown/black skin cancers.  Consider the ABCDEs of melanoma when giving yourself your monthly full body exam ( don't forget the groin, buttocks, feet, toes, etc). A-asymmetry, B-borders, C-color, D-diameter, E-elevation or evolving. If you see any of these changes please follow up in clinic. If you cannot see your back I recommend purchasing a hand held mirror to use with a larger wall mirror.                       Wound Care Instructions     FOR SUPERFICIAL WOUNDS     [facility] [phone]    [facility] [phone]          AFTER 24 HOURS YOU SHOULD REMOVE THE BANDAGE AND BEGIN DAILY DRESSING CHANGES AS FOLLOWS:     1) Remove Dressing.     2) Clean and dry the area with tap water using a Q-tip or sterile gauze pad.     3) Apply Vaseline, Aquaphor, Polysporin ointment or Bacitracin ointment over entire wound.  Do NOT use Neosporin ointment.     4) Cover the wound with a band-aid, or a sterile non-stick gauze pad and micropore paper tape      REPEAT THESE INSTRUCTIONS AT LEAST ONCE A DAY UNTIL THE WOUND HAS COMPLETELY HEALED.    It is an old wives tale that a wound heals better when it is exposed to air and allowed to dry out. The wound will heal faster with a better cosmetic result if it is kept moist with ointment and covered with a bandage.    **Do not let the wound dry out.**      Supplies Needed:      *Cotton tipped applicators (Q-tips)    *Polysporin Ointment or Bacitracin Ointment (NOT NEOSPORIN)    *Band-aids or non-stick gauze pads and micropore paper tape.      PATIENT INFORMATION:    During the healing  process you will notice a number of changes. All wounds develop a small halo of redness surrounding the wound.  This means healing is occurring. Severe itching with extensive redness usually indicates sensitivity to the ointment or bandage tape used to dress the wound.  You should call our office if this develops.      Swelling  and/or discoloration around your surgical site is common, particularly when performed around the eye.    All wounds normally drain.  The larger the wound the more drainage there will be.  After 7-10 days, you will notice the wound beginning to shrink and new skin will begin to grow.  The wound is healed when you can see skin has formed over the entire area.  A healed wound has a healthy, shiny look to the surface and is red to dark pink in color to normalize.  Wounds may take approximately 4-6 weeks to heal.  Larger wounds may take 6-8 weeks.  After the wound is healed you may discontinue dressing changes.    You may experience a sensation of tightness as your wound heals. This is normal and will gradually subside.    Your healed wound may be sensitive to temperature changes. This sensitivity improves with time, but if you re having a lot of discomfort, try to avoid temperature extremes.    Patients frequently experience itching after their wound appears to have healed because of the continue healing under the skin.  Plain Vaseline will help relieve the itching.        POSSIBLE COMPLICATIONS    BLEEDIN. Leave the bandage in place.  2. Use tightly rolled up gauze or a cloth to apply direct pressure over the bandage for 30  minutes.  3. Reapply pressure for an additional 30 minutes if necessary  4. Use additional gauze and tape to maintain pressure once the bleeding has stopped.

## 2020-09-18 NOTE — LETTER
2020         RE: Tessa Ospina  [address]  [facility] [address]        Dear Colleague,    Thank you for referring your patient, Tessa Ospina, to the [facility]. Please see a copy of my visit note below.    HPI:  Tessa Ospina is a 34 year old female patient here today for spot on abdomen .  Patient states this has been present for a while.  Patient reports the following symptoms: changing .  Patient reports the following previous treatments: none.  Patient reports the following modifying factors: none.  Associated symptoms: none. Pt is BRCA2 positive and was told to have yearly FBE due to increased risk of MM. Had bilateral mastectomies and reconstruction.  Patient has no other skin complaints today.  Remainder of the HPI, Meds, PMH, Allergies, FH, and SH was reviewed in chart.    Pertinent Hx:   BRCA2  Past Medical History:   Diagnosis Date     BRCA2 positive     has had bilateral mastectomies and reconstruction, still has ovaries     PONV (postoperative nausea and vomiting)     Unsure if this was due to anesthesia and/or vicodin.       Past Surgical History:   Procedure Laterality Date     BREAST BIOPSY, RT/LT Right 2015    benign      SECTION  , 2013     x 2     COSMETIC SURGERY  2016, 2016    Prophylactic bilateral mastectomy and reconstruction     GRAFT FAT TO BREAST Bilateral 2017    Procedure: GRAFT FAT TO BREAST;  Bilateral Breast mastopexy and Fat Grafting from Abdomen;  Surgeon: SUZY Arora MD;  Location:  OR     MASTECTOMY, NIPPLE SPARING Bilateral 6/15/2016    Procedure: MASTECTOMY, NIPPLE SPARING;  Surgeon: Nely Mcqueen MD;  Location:  OR     RECONSTRUCT BREAST BILATERAL Bilateral 6/15/2016    Procedure: RECONSTRUCT BREAST BILATERAL;  Surgeon: SUZY Arora MD;  Location:  OR     RECONSTRUCT BREAST SECOND STAGE BILATERAL N/A 9/15/2016    Procedure: RECONSTRUCT BREAST SECOND STAGE BILATERAL;   Surgeon: SUZY Arora MD;  Location: [facility] OR     TONSILLECTOMY & ADENOIDECTOMY       [facility]          Family History   Problem Relation Age of Onset     Breast Cancer Mother 49        BRCA2 genetic mutation     Pre-Diabetes Mother      Other Cancer Maternal Grandfather 60        Prostate and throat     Prostate Cancer Maternal Grandfather      Genetic Disorder Maternal Aunt         BRCA2 genetic mutation     Cancer Maternal Uncle 70        3 uncles (all 70s and 80s)     Breast Cancer Other 30        maternal cousin; BRCA2 genetic mutation     Other Cancer Other 60        maternal cousin; prostate cancer     Breast Cancer Maternal Aunt 70     Breast Cancer Other 40        maternal cousin     Cancer - colorectal Other 70        Maternal great grandfather     Cancer - colorectal Paternal Uncle 40         in 40s     Breast Cancer Other      Breast Cancer Cousin      Colon Cancer Other      Diabetes Other      Other Cancer Other        Social History     Socioeconomic History     Marital status:      Spouse name: Danny     Number of children: 2     Years of education: Not on file     Highest education level: Not on file   Occupational History     Occupation: [employer]     Employer: SELF   Social Needs     Financial resource strain: Not on file     Food insecurity     Worry: Not on file     Inability: Not on file     Transportation needs     Medical: Not on file     Non-medical: Not on file   Tobacco Use     Smoking status: Never Smoker     Smokeless tobacco: Never Used   Substance and Sexual Activity     Alcohol use: Yes     Alcohol/week: 0.0 standard drinks     Drug use: No     Sexual activity: Yes     Partners: Male     Birth control/protection: Male Surgical     Comment: vasectomy   Lifestyle     Physical activity     Days per week: Not on file     Minutes per session: Not on file     Stress: Not on file   Relationships     Social connections     Talks on phone: Not on file     Gets  together: Not on file     Attends Hinduism service: Not on file     Active member of club or organization: Not on file     Attends meetings of clubs or organizations: Not on file     Relationship status: Not on file     Intimate partner violence     Fear of current or ex partner: Not on file     Emotionally abused: Not on file     Physically abused: Not on file     Forced sexual activity: Not on file   Other Topics Concern     Parent/sibling w/ CABG, MI or angioplasty before 65F 55M? Not Asked      Service Not Asked     Blood Transfusions Not Asked     Caffeine Concern No     Occupational Exposure No     Hobby Hazards No     Sleep Concern No     Stress Concern Yes     Weight Concern Yes     Comment: in the process of getting back to prepregnancy weight     Special Diet No     Back Care Not Asked     Exercise No     Comment: keeping busy with 2 children under the age of 4; exercising using high intensity interval classes     Bike Helmet Not Asked     Seat Belt Not Asked     Self-Exams Yes   Social History Narrative     Not on file       Outpatient Encounter Medications as of 9/18/2020   Medication Sig Dispense Refill     magnesium 250 MG tablet Take 1 tablet by mouth daily       Multiple Vitamin (MULTIVITAMINS PO)        No facility-administered encounter medications on file as of 9/18/2020.        Review Of Systems:  Skin: spot on abdomen  Eyes: negative  Ears/Nose/Throat: negative  Respiratory: No shortness of breath, dyspnea on exertion, cough, or hemoptysis  Cardiovascular: negative  Gastrointestinal: negative  Genitourinary: negative  Musculoskeletal: negative  Neurologic: negative  Psychiatric: negative  Hematologic/Lymphatic/Immunologic: negative  Endocrine: negative      Objective:     /64   Breastfeeding No   Eyes: Conjunctivae/lids: Normal   ENT: Lips:  Normal  MSK: Normal  Cardiovascular: Peripheral edema none  Pulm: Breathing Normal  Neuro/Psych: Orientation: A/O x 3. Normal; Mood/Affect:  Normal, NAD, WDWN  Pt accompanied by: self  Following areas examined: Scalp, face, eyelids, lips, neck, chest, abdomen, back, and R&L upper and lower extremities. Pt defers exam of buttock, hips, groin and genitals.   Flores skin type:ii   Findings:  Well circumscribed macules with symmetric color distribution on trunk and extremities.  Tan WD smooth macules on face, neck, trunk, and extremities.  Brown macule with atypical pigment distribution on left upper quadrant 0.4cm  Atypical appearing Brown and red papule on right medial mid back 0.3cm    Assessment and Plan:     1) Benign nevi, Lentigines     I discussed the specifics of tumor, prognosis, and genetics of benign lesions.  I explained that treatment of these lesions would be purely cosmetic and not medically neccessary.  I discussed with patient different removal options including excision, cryotherapy, cautery and /or laser.  Lesion may recur and/or may not completely resolve. May need additional treatment.     2) Neoplasm of uncertain behavior right medial mid back 0.3cm  Rule out atypical nevus  TANGENTIAL BIOPSY:  After consent, anesthesia with LEC and prep, tangential biopsy performed.  No complications and routine wound care.  May grow back and will get a scar. Based on lesion type may need to completely remove lesion. Patient will be notified in 7-10 days of results. Wound care directions given.    3) Neoplasm of uncertain behavior left upper quadrant 0.4cm  Rule out atypical nevus  TANGENTIAL BIOPSY:  After consent, anesthesia with LEC and prep, tangential biopsy performed.  No complications and routine wound care.  May grow back and will get a scar. Based on lesion type may need to completely remove lesion. Patient will be notified in 7-10 days of results. Wound care directions given.    4) BRCA2 positive  Recommend yearly FBE  Signs and Symptoms of non-melanoma skin cancer and ABCDEs of melanoma reviewed with patient. Patient encouraged to  perform monthly self skin exams and educated on how to perform them. UV precautions reviewed with patient. Patient was asked about new or changing moles/lesions on body.   Wear a sunscreen with at least SPF 30 on your face, ears, neck and V of the chest daily. Wear sunscreen on other areas of the body if those areas are exposed to the sun throughout the day. Sunscreens can contain physical and/or chemical blockers. Physical blockers are less likely to clog pores, these include zinc oxide and titanium dioxide. Reapply every two hour and after swimming. Sunscreen examples include Neutrogena, CeraVe, Blue Lizard, Elta MD and many others.    Proper skin care from [facility]:    -Eliminate harsh soaps as they strip the natural oils from the skin, often resulting in dry itchy skin ( i.e. Dial, Zest, [place] Spring)  -Use mild soaps such as Cetaphil or Dove Sensitive Skin in the shower. You do not need to use soap on arms, legs, and trunk every time you shower unless visibly soiled.   -Avoid hot or cold showers.  -After showering, lightly dry off and apply moisturizing within 2-3 minutes. This will help trap moisture in the skin.   -Aggressive use of a moisturizer at least 1-2 times a day to the entire body (including -Vanicream, Cetaphil, Aquaphor or Cerave) and moisturize hands after every washing.  -We recommend using moisturizers that come in a tub that needs to be scooped out, not a pump. This has more of an oil base. It will hold moisture in your skin much better than a water base moisturizer. The above recommended are non-pore clogging.               Follow up in yearly FBE      Again, thank you for allowing me to participate in the care of your patient.        Sincerely,        Pat Ross PA-C

## 2020-09-18 NOTE — PROGRESS NOTES
HPI:  Tessa Ospina is a 34 year old female patient here today for spot on abdomen .  Patient states this has been present for a while.  Patient reports the following symptoms: changing .  Patient reports the following previous treatments: none.  Patient reports the following modifying factors: none.  Associated symptoms: none. Pt is BRCA2 positive and was told to have yearly FBE due to increased risk of MM. Had bilateral mastectomies and reconstruction.  Patient has no other skin complaints today.  Remainder of the HPI, Meds, PMH, Allergies, FH, and SH was reviewed in chart.    Pertinent Hx:   BRCA2  Past Medical History:   Diagnosis Date     BRCA2 positive     has had bilateral mastectomies and reconstruction, still has ovaries     PONV (postoperative nausea and vomiting)     Unsure if this was due to anesthesia and/or vicodin.       Past Surgical History:   Procedure Laterality Date     BREAST BIOPSY, RT/LT Right 2015    benign      SECTION  , 2013     x 2     COSMETIC SURGERY  2016, 2016    Prophylactic bilateral mastectomy and reconstruction     GRAFT FAT TO BREAST Bilateral 2017    Procedure: GRAFT FAT TO BREAST;  Bilateral Breast mastopexy and Fat Grafting from Abdomen;  Surgeon: SUZY Arora MD;  Location:  OR     MASTECTOMY, NIPPLE SPARING Bilateral 6/15/2016    Procedure: MASTECTOMY, NIPPLE SPARING;  Surgeon: Nely Mcqueen MD;  Location:  OR     RECONSTRUCT BREAST BILATERAL Bilateral 6/15/2016    Procedure: RECONSTRUCT BREAST BILATERAL;  Surgeon: SUZY Arora MD;  Location:  OR     RECONSTRUCT BREAST SECOND STAGE BILATERAL N/A 9/15/2016    Procedure: RECONSTRUCT BREAST SECOND STAGE BILATERAL;  Surgeon: SUZY Arora MD;  [facility]     TONSILLECTOMY & ADENOIDECTOMY       [facility]          Family History   Problem Relation Age of Onset     Breast Cancer Mother 49        BRCA2 genetic mutation      Pre-Diabetes Mother      Other Cancer Maternal Grandfather 60        Prostate and throat     Prostate Cancer Maternal Grandfather      Genetic Disorder Maternal Aunt         BRCA2 genetic mutation     Cancer Maternal Uncle 70        3 uncles (all 70s and 80s)     Breast Cancer Other 30        maternal cousin; BRCA2 genetic mutation     Other Cancer Other 60        maternal cousin; prostate cancer     Breast Cancer Maternal Aunt 70     Breast Cancer Other 40        maternal cousin     Cancer - colorectal Other 70        Maternal great grandfather     Cancer - colorectal Paternal Uncle 40         in 40s     Breast Cancer Other      Breast Cancer Cousin      Colon Cancer Other      Diabetes Other      Other Cancer Other        Social History     Socioeconomic History     Marital status:      Spouse name: Danny     Number of children: 2     Years of education: Not on file     Highest education level: Not on file   Occupational History     Occupation: [employer]     Employer: SELF   Social Needs     Financial resource strain: Not on file     Food insecurity     Worry: Not on file     Inability: Not on file     Transportation needs     Medical: Not on file     Non-medical: Not on file   Tobacco Use     Smoking status: Never Smoker     Smokeless tobacco: Never Used   Substance and Sexual Activity     Alcohol use: Yes     Alcohol/week: 0.0 standard drinks     Drug use: No     Sexual activity: Yes     Partners: Male     Birth control/protection: Male Surgical     Comment: vasectomy   Lifestyle     Physical activity     Days per week: Not on file     Minutes per session: Not on file     Stress: Not on file   Relationships     Social connections     Talks on phone: Not on file     Gets together: Not on file     Attends Sabianist service: Not on file     Active member of club or organization: Not on file     Attends meetings of clubs or organizations: Not on file     Relationship status: Not on file     Intimate partner  violence     Fear of current or ex partner: Not on file     Emotionally abused: Not on file     Physically abused: Not on file     Forced sexual activity: Not on file   Other Topics Concern     Parent/sibling w/ CABG, MI or angioplasty before 65F 55M? Not Asked      Service Not Asked     Blood Transfusions Not Asked     Caffeine Concern No     Occupational Exposure No     Hobby Hazards No     Sleep Concern No     Stress Concern Yes     Weight Concern Yes     Comment: in the process of getting back to prepregnancy weight     Special Diet No     Back Care Not Asked     Exercise No     Comment: keeping busy with 2 children under the age of 4; exercising using high intensity interval classes     Bike Helmet Not Asked     Seat Belt Not Asked     Self-Exams Yes   Social History Narrative     Not on file       Outpatient Encounter Medications as of 9/18/2020   Medication Sig Dispense Refill     magnesium 250 MG tablet Take 1 tablet by mouth daily       Multiple Vitamin (MULTIVITAMINS PO)        No facility-administered encounter medications on file as of 9/18/2020.        Review Of Systems:  Skin: spot on abdomen  Eyes: negative  Ears/Nose/Throat: negative  Respiratory: No shortness of breath, dyspnea on exertion, cough, or hemoptysis  Cardiovascular: negative  Gastrointestinal: negative  Genitourinary: negative  Musculoskeletal: negative  Neurologic: negative  Psychiatric: negative  Hematologic/Lymphatic/Immunologic: negative  Endocrine: negative      Objective:     /64   Breastfeeding No   Eyes: Conjunctivae/lids: Normal   ENT: Lips:  Normal  MSK: Normal  Cardiovascular: Peripheral edema none  Pulm: Breathing Normal  Neuro/Psych: Orientation: A/O x 3. Normal; Mood/Affect: Normal, NAD, WDWN  Pt accompanied by: self  Following areas examined: Scalp, face, eyelids, lips, neck, chest, abdomen, back, and R&L upper and lower extremities. Pt defers exam of buttock, hips, groin and genitals.   Flores skin  type:ii   Findings:  Well circumscribed macules with symmetric color distribution on trunk and extremities.  Tan WD smooth macules on face, neck, trunk, and extremities.  Brown macule with atypical pigment distribution on left upper quadrant 0.4cm  Atypical appearing Brown and red papule on right medial mid back 0.3cm    Assessment and Plan:     1) Benign nevi, Lentigines     I discussed the specifics of tumor, prognosis, and genetics of benign lesions.  I explained that treatment of these lesions would be purely cosmetic and not medically neccessary.  I discussed with patient different removal options including excision, cryotherapy, cautery and /or laser.  Lesion may recur and/or may not completely resolve. May need additional treatment.     2) Neoplasm of uncertain behavior right medial mid back 0.3cm  Rule out atypical nevus  TANGENTIAL BIOPSY:  After consent, anesthesia with LEC and prep, tangential biopsy performed.  No complications and routine wound care.  May grow back and will get a scar. Based on lesion type may need to completely remove lesion. Patient will be notified in 7-10 days of results. Wound care directions given.    3) Neoplasm of uncertain behavior left upper quadrant 0.4cm  Rule out atypical nevus  TANGENTIAL BIOPSY:  After consent, anesthesia with LEC and prep, tangential biopsy performed.  No complications and routine wound care.  May grow back and will get a scar. Based on lesion type may need to completely remove lesion. Patient will be notified in 7-10 days of results. Wound care directions given.    4) BRCA2 positive  Recommend yearly FBE  Signs and Symptoms of non-melanoma skin cancer and ABCDEs of melanoma reviewed with patient. Patient encouraged to perform monthly self skin exams and educated on how to perform them. UV precautions reviewed with patient. Patient was asked about new or changing moles/lesions on body.   Wear a sunscreen with at least SPF 30 on your face, ears, neck and  V of the chest daily. Wear sunscreen on other areas of the body if those areas are exposed to the sun throughout the day. Sunscreens can contain physical and/or chemical blockers. Physical blockers are less likely to clog pores, these include zinc oxide and titanium dioxide. Reapply every two hour and after swimming. Sunscreen examples include Neutrogena, CeraVe, Blue Lizard, Elta MD and many others.    Proper skin care from [facility]:    -Eliminate harsh soaps as they strip the natural oils from the skin, often resulting in dry itchy skin ( i.e. Dial, Zest, [place] Spring)  -Use mild soaps such as Cetaphil or Dove Sensitive Skin in the shower. You do not need to use soap on arms, legs, and trunk every time you shower unless visibly soiled.   -Avoid hot or cold showers.  -After showering, lightly dry off and apply moisturizing within 2-3 minutes. This will help trap moisture in the skin.   -Aggressive use of a moisturizer at least 1-2 times a day to the entire body (including -Vanicream, Cetaphil, Aquaphor or Cerave) and moisturize hands after every washing.  -We recommend using moisturizers that come in a tub that needs to be scooped out, not a pump. This has more of an oil base. It will hold moisture in your skin much better than a water base moisturizer. The above recommended are non-pore clogging.               Follow up in yearly FBE

## 2020-09-22 LAB — COPATH REPORT: NORMAL

## 2020-10-26 ENCOUNTER — VIRTUAL VISIT (OUTPATIENT)
Dept: FAMILY MEDICINE | Facility: OTHER | Age: 35
End: 2020-10-26

## 2020-10-26 NOTE — PROGRESS NOTES
"Date: 10/26/2020 09:46:27  Clinician: Ambrosio Gilmore  Clinician NPI: [id number]  Patient: Tessa Ospina  Patient : 1985  Patient Address: [address] [place] [id number]  Patient Phone: [phone]  Visit Protocol: URI  Patient Summary:  Tessa is a 35 year old ( : 1985 ) female who initiated a [facility] Visit for COVID-19 (Coronavirus) evaluation and screening. When asked the question \"Please sign me up to receive news, health information and promotions. \", Tessa responded \"Yes\".    Tessa states her symptoms started 1-2 days ago.   Her symptoms consist of ear pain, a headache, enlarged lymph nodes, nasal congestion, rhinitis, myalgia, malaise, a sore throat, and diarrhea. She is experiencing mild difficulty breathing with activities but can speak normally in full sentences. Tessa also feels feverish.   Symptom details     Nasal secretions: The color of her mucus is yellow.    Sore throat: Tessa reports having mild throat pain (1-3 on a 10 point pain scale), does not have exudate on her tonsils, and can swallow liquids. The lymph nodes in her neck are enlarged. A rash has not appeared on the skin since the sore throat started.     Temperature: Her current temperature is 98.4 degrees Fahrenheit.     Headache: She states the headache is mild (1-3 on a 10 point pain scale).      Tessa denies having wheezing, cough, anosmia, vomiting, nausea, facial pain or pressure, chills, teeth pain, and ageusia. She also denies having a sinus infection within the past year, taking antibiotic medication in the past month, and having recent facial or sinus surgery in the past 60 days.   Precipitating events  Within the past week, Tessa has not been exposed to someone with strep throat. She has not recently been exposed to someone with influenza. Tessa has been in close contact with the following high risk individuals: people with asthma, heart disease or diabetes and immunocompromised people.   Pertinent COVID-19 " (Coronavirus) information  In the past 14 days, Tessa has not worked in a congregate living setting.   She does not work or volunteer as healthcare worker or a  and does not work or volunteer in a healthcare facility.   Tessa also has not lived in a congregate living setting in the past 14 days. She does not live with a healthcare worker.   Tessa has had a close contact with a laboratory-confirmed COVID-19 patient within 14 days of symptom onset. Additional information about contact with COVID-19 (Coronavirus) patient as reported by the patient (free text): father in law and brother in law tested positive on 10/26. we went [place] on 10[place]16,10/17, 10/18. They started experiencing symptoms on 10/19 and got tested.   Since December 2019, Tessa and has not had upper respiratory infection or influenza-like illness. Has not been diagnosed with lab-confirmed COVID-19 test   Pertinent medical history  Tessa typically gets a yeast infection when she takes antibiotics. She has not used fluconazole (Diflucan) to treat previous yeast infections.   Tessa does not need a return to work/school note.   Weight: 172 lbs   Tessa does not smoke or use smokeless tobacco.   She denies pregnancy and denies breastfeeding. She has menstruated in the past month.   Additional information as reported by the patient (free text): My whole family needs to get tested. There are four of us, Danny Ospina, Agustín Ospina, and Nely Ospina. My son, Agustín has asthma. We were around my mother who is immunosuppressed and I am concerned we may have infected her. If so, she'll need to talk to her doctor immediately.   Weight: 172 lbs    MEDICATIONS: No current medications, ALLERGIES: Toradol, codeine, Vicodin  Clinician Response:  Dear Tessa,   Your symptoms show that you may have coronavirus (COVID-19). This illness can cause fever, cough and trouble breathing. Many people get a mild case and get better on their own. Some  "people can get very sick.  What should I do?  We would like to test you for this virus.   1. Please call [phone] to schedule your visit. Explain that you were referred by [facility] to have a COVID-19 test. Be ready to share [facility] ID number.  Please note that if you are assessed for Covid-19 testing and receive an order for testing from [facility], that the scheduling of your Covid test at [facility] may be delayed by three or four days or more due to limited availability for testing. Additional options for testing can be found on the [place] Covid-19 Response website. https://[place].gov/covid19/    The following will serve as your written order for this COVID Test, ordered by me, for the indication of suspected COVID [Z20.828]: The test will be ordered in [employer], our electronic health record, after you are scheduled. It will show as ordered and authorized by Macario Weinstein MD.  Order: COVID-19 (Coronavirus) PCR for SYMPTOMATIC testing from [facility].   2. When it's time for your COVID test:  Stay at least 6 feet away from others. (If someone will drive you to your test, stay in the backseat, as far away from the  as you can.)   Cover your mouth and nose with a mask, tissue or washcloth.  Go straight to the testing site. Don't make any stops on the way there or back.      3.Starting now: Stay home and away from others (self-isolate) until:   You've had no fever---and no medicine that reduces fever---for one full day (24 hours). And...   Your other symptoms have gotten better. For example, your cough or breathing has improved. And...   At least 10 days have passed since your symptoms started.       During this time, don't leave the house except for testing or medical care.   Stay in your own room, even for meals. Use your own bathroom if you can.   Stay away from others in your home. No hugging, kissing or shaking hands. No visitors.  Don't go to work, school or anywhere else.    Clean \"high touch\" " surfaces often (doorknobs, counters, handles, etc.). Use a household cleaning spray or wipes. You'll find a full list of  on the EPA website: www.epa.gov/pesticide-registration/list-n-disinfectants-use-against-sars-cov-2.   Cover your mouth and nose with a mask, tissue or washcloth to avoid spreading germs.  Wash your hands and face often. Use soap and water.  Caregivers in these groups are at risk for severe illness due to COVID-19:  o People 65 years and older  o People who live in a nursing home or long-term care facility  o People with chronic disease (lung, heart, cancer, diabetes, kidney, liver, immunologic)  o People who have a weakened immune system, including those who:   Are in cancer treatment  Take medicine that weakens the immune system, such as corticosteroids  Had a bone marrow or organ transplant  Have an immune deficiency  Have poorly controlled HIV or AIDS  Are obese (body mass index of 40 or higher)  Smoke regularly   o Caregivers should wear gloves while washing dishes, handling laundry and cleaning bedrooms and bathrooms.  o Use caution when washing and drying laundry: Don't shake dirty laundry, and use the warmest water setting that you can.  o For more tips, go to www.cdc.gov/coronavirus/2019-ncov/downloads/10Things.pdf.    4.Sign up for [employer]. We know it's scary to hear that you might have COVID-19. We want to track your symptoms to make sure you're okay over the next 2 weeks. Please look for an email from [employer]---this is a free, online program that we'll use to keep in touch. To sign up, follow the link in the email. Learn more at [email or web address]  How can I take care of myself?   Get lots of rest. Drink extra fluids (unless a doctor has told you not to).   Take Tylenol (acetaminophen) for fever or pain. If you have liver or kidney problems, ask your family doctor if it's okay to take Tylenol.   Adults can take either:    650 mg (two 325 mg pills) every  4 to 6 hours, or...   1,000 mg (two 500 mg pills) every 8 hours as needed.    Note: Don't take more than 3,000 mg in one day. Acetaminophen is found in many medicines (both prescribed and over-the-counter medicines). Read all labels to be sure you don't take too much.   For children, check the Tylenol bottle for the right dose. The dose is based on the child's age or weight.    If you have other health problems (like cancer, heart failure, an organ transplant or severe kidney disease): Call your specialty clinic if you don't feel better in the next 2 days.       Know when to call 911. Emergency warning signs include:    Trouble breathing or shortness of breath Pain or pressure in the chest that doesn't go away Feeling confused like you haven't felt before, or not being able to wake up Bluish-colored lips or face.  Where can I get more information?   [facility] -- About COVID-19: [email or web address]   CDC -- What to Do If You're Sick: www.cdc.gov/coronavirus/2019-ncov/about/steps-when-sick.html   CDC -- Ending Home Isolation: www.cdc.gov/coronavirus/2019-ncov/hcp/disposition-in-home-patients.html   CDC -- Caring for Someone: www.cdc.gov/coronavirus/2019-ncov/if-you-are-sick/care-for-someone.html   [facility] -- Interim Guidance for Hospital Discharge to Home: www.health.[facility].[place]/diseases/coronavirus/hcp/hospdischarge.pdf   [facility] clinical trials (COVID-19 research studies): clinicalaffairs.[facility]/umn-clinical-trials    Below are the COVID-19 hotlines at the [facility] ([facility]). Interpreters are available.    For health questions: Call 062-255-3283 or 1-899.853.9328 (7 a.m. to 7 p.m.) For questions about schools and childcare: Call 299-720-5187 or 1-113.595.1905 (7 a.m. to 7 p.m.)    Diagnosis: Contact with and (suspected) exposure to other viral communicable diseases  Diagnosis ICD: Z20.828

## 2020-11-03 ENCOUNTER — OFFICE VISIT (OUTPATIENT)
Dept: OBGYN | Facility: CLINIC | Age: 35
End: 2020-11-03
Payer: COMMERCIAL

## 2020-11-03 VITALS
WEIGHT: 172 LBS | BODY MASS INDEX: 27 KG/M2 | HEIGHT: 67 IN | DIASTOLIC BLOOD PRESSURE: 56 MMHG | SYSTOLIC BLOOD PRESSURE: 102 MMHG

## 2020-11-03 DIAGNOSIS — Z15.01 BRCA2 GENE MUTATION POSITIVE: Primary | ICD-10-CM

## 2020-11-03 DIAGNOSIS — Z15.09 BRCA2 GENE MUTATION POSITIVE: Primary | ICD-10-CM

## 2020-11-03 DIAGNOSIS — Z23 NEED FOR PROPHYLACTIC VACCINATION AND INOCULATION AGAINST INFLUENZA: ICD-10-CM

## 2020-11-03 DIAGNOSIS — B00.1 RECURRENT COLD SORES: ICD-10-CM

## 2020-11-03 LAB — CANCER AG125 SERPL-ACNC: 14 U/ML (ref 0–30)

## 2020-11-03 PROCEDURE — 90471 IMMUNIZATION ADMIN: CPT | Performed by: OBSTETRICS & GYNECOLOGY

## 2020-11-03 PROCEDURE — 90686 IIV4 VACC NO PRSV 0.5 ML IM: CPT | Performed by: OBSTETRICS & GYNECOLOGY

## 2020-11-03 PROCEDURE — 36415 COLL VENOUS BLD VENIPUNCTURE: CPT | Performed by: OBSTETRICS & GYNECOLOGY

## 2020-11-03 PROCEDURE — 99395 PREV VISIT EST AGE 18-39: CPT | Mod: 25 | Performed by: OBSTETRICS & GYNECOLOGY

## 2020-11-03 PROCEDURE — 86304 IMMUNOASSAY TUMOR CA 125: CPT | Performed by: OBSTETRICS & GYNECOLOGY

## 2020-11-03 RX ORDER — VALACYCLOVIR HYDROCHLORIDE 1 G/1
2000 TABLET, FILM COATED ORAL 2 TIMES DAILY
Qty: 4 TABLET | Refills: 4 | Status: SHIPPED | OUTPATIENT
Start: 2020-11-03 | End: 2021-02-05

## 2020-11-03 ASSESSMENT — PATIENT HEALTH QUESTIONNAIRE - PHQ9
5. POOR APPETITE OR OVEREATING: NOT AT ALL
SUM OF ALL RESPONSES TO PHQ QUESTIONS 1-9: 1

## 2020-11-03 ASSESSMENT — ANXIETY QUESTIONNAIRES
2. NOT BEING ABLE TO STOP OR CONTROL WORRYING: NOT AT ALL
5. BEING SO RESTLESS THAT IT IS HARD TO SIT STILL: NOT AT ALL
3. WORRYING TOO MUCH ABOUT DIFFERENT THINGS: NOT AT ALL
IF YOU CHECKED OFF ANY PROBLEMS ON THIS QUESTIONNAIRE, HOW DIFFICULT HAVE THESE PROBLEMS MADE IT FOR YOU TO DO YOUR WORK, TAKE CARE OF THINGS AT HOME, OR GET ALONG WITH OTHER PEOPLE: NOT DIFFICULT AT ALL
1. FEELING NERVOUS, ANXIOUS, OR ON EDGE: SEVERAL DAYS
GAD7 TOTAL SCORE: 1
7. FEELING AFRAID AS IF SOMETHING AWFUL MIGHT HAPPEN: NOT AT ALL
6. BECOMING EASILY ANNOYED OR IRRITABLE: NOT AT ALL

## 2020-11-03 ASSESSMENT — MIFFLIN-ST. JEOR: SCORE: 1494.88

## 2020-11-03 NOTE — PROGRESS NOTES
Tessa is a 35 year old  female who presents for annual exam.     Besides routine health maintenance, patient has tenderness under right arm.     HPI:    The patient's PCP is Dr Shameka Tovar MD.   Patient had prophylactic double mastectomy.   Mom had breast cancer survivor over 12 yrs.   May move to MRI screening after age 39.    Refill valtrex for cold sores  No masses felt under arm  Planning bso eventually since she is a BrCa2 carrier  Has 2 kids  Periods ok    GYNECOLOGIC HISTORY:    Patient's last menstrual period was 10/14/2020.    Regular menses? yes  Menses every 28-30 days.  Length of menses: 4-5 days    Her current contraception method is: vasectomy.  She  reports that she has never smoked. She has never used smokeless tobacco.  Patient is sexually active.    Last PHQ-9 score on record =   PHQ-9 SCORE 11/3/2020   PHQ-9 Total Score 1     Last GAD7 score on record =   BRITT-7 SCORE 11/3/2020   Total Score -   Total Score 1     Alcohol Score = 1    HEALTH MAINTENANCE:  Cholesterol:   Recent Labs   Lab Test 18  1304 17  0922   CHOL 154 156   HDL 60 62   LDL 79 74   TRIG 75 101     TSH   Date Value Ref Range Status   2017 2.49 0.40 - 4.00 mU/L Final     Last Mammo: N/A, patient has h/o prophylactic bilateral mastectomy, will have MRI if needed for screening  Pap:   Lab Results   Component Value Date    PAP NIL, NEG-HPV 2019    PAP NIL 2016   Colonoscopy:  N/A, Result: not applicable, Next Colonoscopy: screen age 50  Dexa: N/A  Health maintenance updated:  yes    HISTORY:  OB History    Para Term  AB Living   2 2 2 0 0 2   SAB TAB Ectopic Multiple Live Births   0 0 0 0 2      # Outcome Date GA Lbr Juan J/2nd Weight Sex Delivery Anes PTL Lv   2 Term 2013    F CS-LTranv   [facility]      Birth Comments: gestational diabetes   1 Term 11    M CS-LTranv   [facility]       Patient Active Problem List   Diagnosis     BRCA2 gene mutation positive     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     S/P bilateral mastectomy     Status post bilateral breast reconstruction     White classification A2 gestational diabetes mellitus (GDM)     ACL (anterior cruciate ligament) rupture     Past Surgical History:   Procedure Laterality Date     BREAST BIOPSY, RT/LT Right 2015    benign      SECTION  , 2013     x 2     COSMETIC SURGERY  2016, 2016    Prophylactic bilateral mastectomy and reconstruction     GRAFT FAT TO BREAST Bilateral 2017    Procedure: GRAFT FAT TO BREAST;  Bilateral Breast mastopexy and Fat Grafting from Abdomen;  Surgeon: SUZY Arora MD;  Location: [facility]     MASTECTOMY, NIPPLE SPARING Bilateral 6/15/2016    Procedure: MASTECTOMY, NIPPLE SPARING;  Surgeon: Nely Mcqueen MD;  Location: [facility] OR     RECONSTRUCT BREAST BILATERAL Bilateral 6/15/2016    Procedure: RECONSTRUCT BREAST BILATERAL;  Surgeon: SUZY Arora MD;  Location: [facility] OR     RECONSTRUCT BREAST SECOND STAGE BILATERAL N/A 9/15/2016    Procedure: RECONSTRUCT BREAST SECOND STAGE BILATERAL;  Surgeon: SUZY Arora MD;  Location: [facility] OR     TONSILLECTOMY & ADENOIDECTOMY  [address] [place] [address]        Social History     Tobacco Use     Smoking status: Never Smoker     Smokeless tobacco: Never Used   Substance Use Topics     Alcohol use: Yes     Alcohol/week: 0.0 standard drinks      Problem (# of Occurrences) Relation (Name,Age of Onset)    Breast Cancer (6) Mother (Maryann Waters, 49): BRCA2 genetic mutation, Other (30): maternal cousin; BRCA2 genetic mutation, Maternal Aunt (70), Other (40): maternal cousin, Other (Great aunt), Cousin (Cousin)    Cancer (1) Maternal Uncle (70): 3 uncles (all 70s and 80s)    Cancer - colorectal (2) Other (70): Maternal great grandfather, Paternal Uncle (40):  in 40s    Colon Cancer (1) Other (Uncle)    Diabetes (1) Other (Uncle)    Genetic Disorder (1)  "Maternal Aunt: BRCA2 genetic mutation    Other Cancer (3) Maternal Grandfather (Max Villar, 60): Prostate and throat, Other (60): maternal cousin; prostate cancer, Other (4 Great Uncles)    Pre-Diabetes (1) Mother (Maryann Waters)    Prostate Cancer (1) Maternal Grandfather (Max Villar)            Current Outpatient Medications   Medication Sig     magnesium 250 MG tablet Take 1 tablet by mouth daily     Multiple Vitamin (MULTIVITAMINS PO)      No current facility-administered medications for this visit.      Allergies   Allergen Reactions     Percocet [Oxycodone-Acetaminophen] Nausea and Vomiting     Vicodin [Hydrocodone-Acetaminophen] Nausea and Vomiting     Sensitivity to narcotics.     Tramadol Nausea and Vomiting       Past medical, surgical, social and family histories were reviewed and updated in EPIC.    ROS:   12 point review of systems negative other than symptoms noted below or in the HPI.  No urinary frequency or dysuria, bladder or kidney problems    EXAM:  /56   Ht 1.689 m (5' 6.5\")   Wt 78 kg (172 lb)   LMP 10/14/2020   BMI 27.35 kg/m     BMI: Body mass index is 27.35 kg/m .    PHYSICAL EXAM:  Constitutional:   Appearance: Well nourished, well developed, alert, in no acute distress  Neck:  Lymph Nodes:  No lymphadenopathy present    Thyroid:  Gland size normal, nontender, no nodules or masses present  on palpation  Chest:  Respiratory Effort:  Breathing unlabored  Cardiovascular:    Heart: Auscultation:  Regular rate, normal rhythm, no murmurs present  Breasts: Inspection of Breasts:  No lymphadenopathy present., Palpation of Breasts and Axillae:  No masses present on palpation, no breast tenderness., Axillary Lymph Nodes:  No lymphadenopathy present. and No nodularity, asymmetry or nipple discharge bilaterally.  Gastrointestinal:   Abdominal Examination:  Abdomen nontender to palpation, tone normal without rigidity or guarding, no masses present, umbilicus without " lesions   Liver and Spleen:  No hepatomegaly present, liver nontender to palpation    Hernias:  No hernias present  Lymphatic: Lymph Nodes:  No other lymphadenopathy present  Skin:  General Inspection:  No rashes present, no lesions present, no areas of  discoloration  Neurologic:    Mental Status:  Oriented X3.  Normal strength and tone, sensory exam                grossly normal, mentation intact and speech normal.    Psychiatric:   Mentation appears normal and affect normal/bright.         Pelvic Exam:  External Genitalia:     Normal appearance for age, no discharge present, no tenderness present, no inflammatory lesions present, color normal  Vagina:     Normal vaginal vault without central or paravaginal defects, no discharge present, no inflammatory lesions present, no masses present  Bladder:     Nontender to palpation  Urethra:   Urethral Body:  Urethra palpation normal, urethra structural support normal   Urethral Meatus:  No erythema or lesions present  Cervix:     Appearance healthy, no lesions present, nontender to palpation, no bleeding present  Uterus:     Uterus: firm, normal sized and nontender, midplane in position.   Adnexa:     No adnexal tenderness present, no adnexal masses present  Perineum:     Perineum within normal limits, no evidence of trauma, no rashes or skin lesions present  Anus:     Anus within normal limits, no hemorrhoids present  Inguinal Lymph Nodes:     No lymphadenopathy present  Pubic Hair:     Normal pubic hair distribution for age  Genitalia and Groin:     No rashes present, no lesions present, no areas of discoloration, no masses present        BMI: Body mass index is 27.35 kg/m .      ASSESSMENT:  35 year old female with satisfactory annual exam.  No diagnosis found.    PLAN:  Refill valtrex  Wants to check Ca125  Discussed braca 2 status  Not ready to have surgical bso yet  No abnormalities felt on breast exam  Planning to have nipples removed due to pain     Jailyn STRONG  MD Pia

## 2020-11-04 ASSESSMENT — ANXIETY QUESTIONNAIRES: GAD7 TOTAL SCORE: 1

## 2021-01-13 ENCOUNTER — ANCILLARY PROCEDURE (OUTPATIENT)
Dept: ULTRASOUND IMAGING | Facility: CLINIC | Age: 36
End: 2021-01-13
Attending: CLINICAL NURSE SPECIALIST
Payer: COMMERCIAL

## 2021-01-13 DIAGNOSIS — Z91.89 AT RISK FOR CANCER: ICD-10-CM

## 2021-01-13 DIAGNOSIS — Z15.09 BRCA2 POSITIVE: ICD-10-CM

## 2021-01-13 DIAGNOSIS — Z15.01 BRCA2 POSITIVE: ICD-10-CM

## 2021-01-13 PROCEDURE — 76856 US EXAM PELVIC COMPLETE: CPT | Performed by: RADIOLOGY

## 2021-01-13 PROCEDURE — 76830 TRANSVAGINAL US NON-OB: CPT | Performed by: RADIOLOGY

## 2021-01-15 ENCOUNTER — VIRTUAL VISIT (OUTPATIENT)
Dept: ONCOLOGY | Facility: CLINIC | Age: 36
End: 2021-01-15
Attending: CLINICAL NURSE SPECIALIST
Payer: COMMERCIAL

## 2021-01-15 DIAGNOSIS — Z15.01 HEREDITARY BREAST AND OVARIAN CANCER SYNDROME ASSOCIATED WITH MUTATION IN BRCA2 GENE: ICD-10-CM

## 2021-01-15 DIAGNOSIS — R22.9 LOCALIZED SUPERFICIAL SWELLING, MASS, OR LUMP: Primary | ICD-10-CM

## 2021-01-15 DIAGNOSIS — Z15.02 HEREDITARY BREAST AND OVARIAN CANCER SYNDROME ASSOCIATED WITH MUTATION IN BRCA2 GENE: ICD-10-CM

## 2021-01-15 PROCEDURE — 99214 OFFICE O/P EST MOD 30 MIN: CPT | Mod: GT | Performed by: CLINICAL NURSE SPECIALIST

## 2021-01-15 PROCEDURE — 999N001193 HC VIDEO/TELEPHONE VISIT; NO CHARGE

## 2021-01-15 NOTE — PROGRESS NOTES
"Tessa is a 35 year old who is being evaluated via a billable video visit.      How would you like to obtain your AVS? MyChart  If the video visit is dropped, the invitation should be resent by: Send to e-mail at: [email or web address]  Will anyone else be joining your video visit? No  Vitals - Patient Reported  Weight (Patient Reported): 77.1 kg (170 lb)  Height (Patient Reported): 167.6 cm (5' 6\")  BMI (Based on Pt Reported Ht/Wt): 27.44  Pain Score: No Pain (0)    Video Start Time:   Video-Visit Details    Type of service:  Video Visit    Video End Time:    Originating Location (pt. Location): Home    Distant Location (provider location):  [facility]     Platform used for Video Visit: [email or web address]     Abbi Cruz MA    Oncology Risk Management Consultation:  Date on this visit: 1/15/2021    Tessa Ospina is participating in a billable video visit today due to CoVid19 pandemic precautions..  She requires screening and surveillance to minimize her risk of cancer secondary to having a deleterious BRCA2 mutation.  She is considered to be at high risk for hereditary breast and ovarian cancer.    Primary Physician:  Shameka Tovar MD    History Of Present Illness:  Ms. Ospina is a very pleasant, healthy 35 year old female who presents with BRCA2+ associated Hereditary Breast and Ovarian Cancer Syndrome.      Genetic testin2015 - POSITIVE for a BRCA2 mutation specifically [id number], the same genetic mutation in her mother. Testing done using single site analysis through [facility].     Pertinent  history:  Menarche at age 13.  First child at age 26.    History of breastfeeding both children. No issues with mastitis.   Ovaries, fallopian tubes and uterus intact.  No history of breast hyperplasia, atypia or malignancy.   2016 - Prophylactic nipple sparing mastectomy with reconstruction (Dr. Nely Mcqueen and Dr. Lam Arora), pathology benign.     Screening " "history:  11/3/15 - Pelvic ultrasound - probable uterine fibroid in anterior myometrium and L ovarian collapsing follicle.   1/26/16: pelvic ultrasound, L complex ovarian lesion.  3/4/16: Pelvic ultrasound, small L ovarian cyst.   11/16/2016 - Pelvic US, normal. No lesions seen.  6/7/2017 - Pelvic US, normal, 2 cm dominant follicle in R ovary  1/25/2018 - 2 dominant cysts in R ovary, consider short term follow up  3/12/2018 - R cysts resolved, pelvic US normal.  10/31/2018 -Pelvic ultrasound, negative pelvic ultrasound.  4/26/2019 - Pelvic ultrasound, Probable corpus luteal cyst in the right ovary. No other  definite abnormality  1/6/2020- Transvaginal ultrasound, Slightly thickened endometrium for age and menstrual status. Similar-appearing small cystic lesion in the right ovary, this may simply represent a recurrent ruptured follicle.  7/29/2020- Transvaginal ultrasound, Abnormally thickened endometrium for age and menstrual status, but similar to previous.No fibroids Probable ruptured follicle in the left ovary.  1/13/2021- Transvaginal ultrasound: Endometrial stripe remains upper normal limits to slightly  thickened given LMP 12/29/2020 but is similar to prior studies and is  otherwise unremarkable. Ovaries within normal limits with an incidental corpus luteum in the right ovary noted.    8/27/2019- Ultrasound of the right axilla for palpable lump. A normal-sized lymph node is identified in the palpable area of concern. No lymphadenopathy, mass, or other suspicious sonographic finding is seen.      3/19/2015 -  - 8  9/17/2015 -  - 17  3/3/2016 -  39 (high)  6/21/2016  - - 22  6/1/2017 -  - 22  1/25/2018 -  - 10  4/26/2019 -  - 13    At this visit, she reports continue pain in the nipples of her remaining breast tissue. She also has concerns about a right axillary lump that was examined with an ultrasound on 8/27/2019. She states that there is an area \"deep within her right pec\" " and under her right armpit that seems to be lumpy, hardened and unyielding to touch .She denies urinary urgency and frequency, abdominal pain, bloating, constipation, unusual vaginal discharge and early satiety.      Past Medical/Surgical History:  Past Medical History:   Diagnosis Date     BRCA2 positive     has had bilateral mastectomies and reconstruction, still has ovaries     PONV (postoperative nausea and vomiting)     Unsure if this was due to anesthesia and/or vicodin.     Past Surgical History:   Procedure Laterality Date     BREAST BIOPSY, RT/LT Right 2015    benign      SECTION  , 2013     x 2     COSMETIC SURGERY  2016, 2016    Prophylactic bilateral mastectomy and reconstruction     GRAFT FAT TO BREAST Bilateral 2017    Procedure: GRAFT FAT TO BREAST;  Bilateral Breast mastopexy and Fat Grafting from Abdomen;  Surgeon: SUZY Arora MD;  Location: [facility] OR     MASTECTOMY, NIPPLE SPARING Bilateral 6/15/2016    Procedure: MASTECTOMY, NIPPLE SPARING;  Surgeon: Nely Mcqueen MD;  Location: [facility] OR     RECONSTRUCT BREAST BILATERAL Bilateral 6/15/2016    Procedure: RECONSTRUCT BREAST BILATERAL;  Surgeon: SUZY Arora MD;  Location: [facility] OR     RECONSTRUCT BREAST SECOND STAGE BILATERAL N/A 9/15/2016    Procedure: RECONSTRUCT BREAST SECOND STAGE BILATERAL;  Surgeon: SUZY Arora MD;  Location: [facility] OR     TONSILLECTOMY & ADENOIDECTOMY       [facility]         Allergies:  Allergies as of 01/15/2021 - Reviewed 2020   Allergen Reaction Noted     Percocet [oxycodone-acetaminophen] Nausea and Vomiting 2015     Vicodin [hydrocodone-acetaminophen] Nausea and Vomiting 2010     Tramadol Nausea and Vomiting 2018       Current Medications:  Current Outpatient Medications   Medication Sig Dispense Refill     magnesium 250 MG tablet Take 1 tablet by mouth daily       Multiple Vitamin (MULTIVITAMINS PO)         valACYclovir (VALTREX) 1000 mg tablet Take 2 tablets (2,000 mg) by mouth 2 times daily for 1 day 4 tablet 4        Family History:  Family History   Problem Relation Age of Onset     Breast Cancer Mother 49        BRCA2 genetic mutation     Pre-Diabetes Mother      Other Cancer Maternal Grandfather 60        Prostate and throat     Prostate Cancer Maternal Grandfather      Genetic Disorder Maternal Aunt         BRCA2 genetic mutation     Cancer Maternal Uncle 70        3 uncles (all 70s and 80s)     Breast Cancer Other 30        maternal cousin; BRCA2 genetic mutation     Other Cancer Other 60        maternal cousin; prostate cancer     Breast Cancer Maternal Aunt 70     Breast Cancer Other 40        maternal cousin     Cancer - colorectal Other 70        Maternal great grandfather     Cancer - colorectal Paternal Uncle 40         in 40s     Breast Cancer Other      Breast Cancer Cousin      Colon Cancer Other      Diabetes Other      Other Cancer Other        Social History:  Social History     Socioeconomic History     Marital status:      Spouse name: Danny     Number of children: 2     Years of education: Not on file     Highest education level: Not on file   Occupational History     Occupation: [employer]     Employer: SELF   Social Needs     Financial resource strain: Not on file     Food insecurity     Worry: Not on file     Inability: Not on file     Transportation needs     Medical: Not on file     Non-medical: Not on file   Tobacco Use     Smoking status: Never Smoker     Smokeless tobacco: Never Used   Substance and Sexual Activity     Alcohol use: Yes     Alcohol/week: 0.0 standard drinks     Drug use: No     Sexual activity: Yes     Partners: Male     Birth control/protection: Male Surgical     Comment: vasectomy   Lifestyle     Physical activity     Days per week: Not on file     Minutes per session: Not on file     Stress: Not on file   Relationships     Social connections     Talks on phone:  Not on file     Gets together: Not on file     Attends Yazdanism service: Not on file     Active member of club or organization: Not on file     Attends meetings of clubs or organizations: Not on file     Relationship status: Not on file     Intimate partner violence     Fear of current or ex partner: Not on file     Emotionally abused: Not on file     Physically abused: Not on file     Forced sexual activity: Not on file   Other Topics Concern     Parent/sibling w/ CABG, MI or angioplasty before 65F 55M? Not Asked      Service Not Asked     Blood Transfusions Not Asked     Caffeine Concern No     Occupational Exposure No     Hobby Hazards No     Sleep Concern No     Stress Concern Yes     Weight Concern Yes     Comment: in the process of getting back to prepregnancy weight     Special Diet No     Back Care Not Asked     Exercise No     Comment: keeping busy with 2 children under the age of 4; exercising using high intensity interval classes     Bike Helmet Not Asked     Seat Belt Not Asked     Self-Exams Yes   Social History Narrative     Not on file       Physical Exam:  There were no vitals taken for this visit.  GENERAL: Healthy, alert and no distress  EYES: Eyes grossly normal to inspection.  No discharge or erythema, or obvious scleral/conjunctival abnormalities.  RESP: No audible wheeze, cough, or visible cyanosis.  No visible retractions or increased work of breathing.    SKIN: Visible skin clear. No significant rash, abnormal pigmentation or lesions.  NEURO: Cranial nerves grossly intact.  Mentation and speech appropriate for age.  PSYCH: Mentation appears normal, affect normal/bright, judgement and insight intact, normal speech and appearance well-groomed.    Laboratory/Imaging Studies  No results found for any visits on 01/15/21.    ASSESSMENT  We discussed that she is having her nipples removed in an outpatient procedure on 2/12 with Dr. Arora, because they have been tender ever since her  prophylactic mastectomy in 2016. She also complains of a lump in her right axillary area, which she is concerned is a lymph node.  She has had this examined in August 2019 with an ultrasound.  I will order her another ultrasound today to re-assess. We discussed the possibility of scar tissue, fat necrosis and enlarged lymph nodes for benign causes.     We also reviewed her family history.  There are no new cancers to report    Her other screening include:  Dermatology, last seen 9/18/2020 (Dr. Pat Ross, [facility]); her next is due September 2021  Ophthalmology/optometry exam - October 2020; her next is due October 2021    INDIVIDUALIZED CANCER RISK MANAGEMENT PLAN:  Individualized Surveillance Plan for women  Hereditary Breast and/or Ovarian Cancer Syndrome   Per NCCN Guidelines Version 1.2020   Recommended screening Test or procedure Last done Next Scheduled    Breast self awareness starting at age 18. Women should be familiar with their breasts and promptly report changes to their care provider. Periodic, consistent self exam may facilitate breast self awareness. Premenopausal women may find self exams to be most informative when performed at the end of menses.     1/15/2021     Júnior. 2022   Breast screening, starting at age 25 Clinical breast exams every 6 -12 months Exam deferred Planning removal of nipples with Dr. Arora on 2/12/2021   Breast screening   Age 25-29 Annual breast MRI screening with contrast (or mammogram if MRI is unavailable) or individualized based on family history if a breast cancer diagnosis before age 30 is present.       Breast MRI is performed preferably on day 7-15 of menstrual cycle for premenopausal women.     NA     NA   Breast screening   Age >30-75 years     Annual mammogram (consider tomosynthesis mammogram) and annual screening MRI.     Breast MRI is performed preferably on day 7-15 of menstrual cycle for premenopausal women.    Age>75 years, management should be  considered on an individual basis.   Prophylactic mastectomy 2016 US of right axillary area ordered to follow up on palpable lump   Ovarian cancer screening, starting at age 30-35 Consider transvaginal ultrasound and  tests. 1/13/2021- Transvaginal ultrasound: Endometrial stripe remains upper normal limits to slightly  thickened given LMP 12/29/2020 but is similar to prior studies and is  otherwise unremarkable. Ovaries within normal limits with an incidental corpus luteum in the right ovary noted. July 2021    Repeat in January 2022   Pancreatic Cancer Screening beginning at age 50 or 10 years prior to the earliest pancreatic cancer on the BRCA-side of the family  In families with exocrine pancreatic cancer in a first or second degree relative     Consider Annual MRI/MRCP and/or Endoscopic Ultrasound     No family history of pancreatic cancer   Review at subsequent visits   Recommendation: risk-reducing salpingo-oophorectomy(RRSO), typically between 35 and 40 years old and  upon completion of child bearing.     Because ovarian cancer onset in patients with BRCA2 mutations is on average of 8-10 years later than in patients with BRCA1 mutations, it is reasonable to delay RRSO until 40-45 years in patients with BRCA2 mutations  unless age at diagnosis in the family warrants earlier age for consideration for prophylactic surgery.    Limited data suggest that there may be a slightly increased risk of serous uterine cancer among women with BRCA1+ pathogenic variants. The provider and the patient should discuss the risks and benefits of a concurrent hysterectomy at the time of RRSO for women with a BRCA1+ pathogenic variant /like pathogenic variant prior to surgery.     Salpingectomy alone is not the standard of care for risk reduction although clinical trials are ongoing.     Discuss option of risk-reducing mastectomy.    Consider risks and benefits of risk reducing agents, such as tamoxifen and raloxifene for  breast and ovarian cancer.    Consider a full body skin and eye exam for melanoma screening for both BRCA1+ and BRCA2+.     NOTE: Women with BRCA mutation who are treated for breast cancer should have screening of the remaining breast tissue with annual mammography and breast MRI.         I spent 30 minutes on the day of the visit  with greater that 50% of it in counseling and coordinating care as documented above.    Cate Castro, APRN-CNS, OCN, AGN-BC  Clinical Nurse Specialist  Cancer Risk Management Program  [facility] [address]  [place] [address]    phone:  [phone]  Pager: [phone]  fax: [phone]    CC: Shameak Tovar MD

## 2021-01-15 NOTE — LETTER
"    1/15/2021         RE: Tessa Ospina  [address]  [facility] [address]        Dear Colleague,    Thank you for referring your patient, Tessa Ospina, to the [facility]. Please see a copy of my visit note below.    Tessa is a 35 year old who is being evaluated via a billable video visit.      How would you like to obtain your AVS? MyChart  If the video visit is dropped, the invitation should be resent by: Send to e-mail at: [email or web address]  Will anyone else be joining your video visit? No  Vitals - Patient Reported  Weight (Patient Reported): 77.1 kg (170 lb)  Height (Patient Reported): 167.6 cm (5' 6\")  BMI (Based on Pt Reported Ht/Wt): 27.44  Pain Score: No Pain (0)    Video Start Time:   Video-Visit Details    Type of service:  Video Visit    Video End Time:    Originating Location (pt. Location): Home    Distant Location (provider location):  [facility]     Platform used for Video Visit: [employer]     Abbi Cruz MA    Oncology Risk Management Consultation:  Date on this visit: 1/15/2021    Tessa Ospina is participating in a billable video visit today due to CoVid19 pandemic precautions..  She requires screening and surveillance to minimize her risk of cancer secondary to having a deleterious BRCA2 mutation.  She is considered to be at high risk for hereditary breast and ovarian cancer.    Primary Physician:  Shameka Tovar MD    History Of Present Illness:  Ms. Ospina is a very pleasant, healthy 35 year old female who presents with BRCA2+ associated Hereditary Breast and Ovarian Cancer Syndrome.      Genetic testin2015 - POSITIVE for a BRCA2 mutation specifically [id number], the same genetic mutation in her mother. Testing done using single site analysis through [facility].     Pertinent  history:  Menarche at age 13.  First child at age 26.    History of breastfeeding both children. No issues with " mastitis.   Ovaries, fallopian tubes and uterus intact.  No history of breast hyperplasia, atypia or malignancy.   6/12/2016 - Prophylactic nipple sparing mastectomy with reconstruction (Dr. Nely Mcqueen and Dr. Lam Arora), pathology benign.     Screening history:  11/3/15 - Pelvic ultrasound - probable uterine fibroid in anterior myometrium and L ovarian collapsing follicle.   1/26/16: pelvic ultrasound, L complex ovarian lesion.  3/4/16: Pelvic ultrasound, small L ovarian cyst.   11/16/2016 - Pelvic US, normal. No lesions seen.  6/7/2017 - Pelvic US, normal, 2 cm dominant follicle in R ovary  1/25/2018 - 2 dominant cysts in R ovary, consider short term follow up  3/12/2018 - R cysts resolved, pelvic US normal.  10/31/2018 -Pelvic ultrasound, negative pelvic ultrasound.  4/26/2019 - Pelvic ultrasound, Probable corpus luteal cyst in the right ovary. No other  definite abnormality  1/6/2020- Transvaginal ultrasound, Slightly thickened endometrium for age and menstrual status. Similar-appearing small cystic lesion in the right ovary, this may simply represent a recurrent ruptured follicle.  7/29/2020- Transvaginal ultrasound, Abnormally thickened endometrium for age and menstrual status, but similar to previous.No fibroids Probable ruptured follicle in the left ovary.  1/13/2021- Transvaginal ultrasound: Endometrial stripe remains upper normal limits to slightly  thickened given LMP 12/29/2020 but is similar to prior studies and is  otherwise unremarkable. Ovaries within normal limits with an incidental corpus luteum in the right ovary noted.    8/27/2019- Ultrasound of the right axilla for palpable lump. A normal-sized lymph node is identified in the palpable area of concern. No lymphadenopathy, mass, or other suspicious sonographic finding is seen.      3/19/2015 -  - 9  9/17/2015 -  - 18  3/3/2016 -  40 (high)  6/21/2016  - - 23  6/1/2017 -  - 23  1/25/2018 -  - 10  4/26/2019 -  -  "13    At this visit, she reports continue pain in the nipples of her remaining breast tissue. She also has concerns about a right axillary lump that was examined with an ultrasound on 2019. She states that there is an area \"deep within her right pec\" and under her right armpit that seems to be lumpy, hardened and unyielding to touch .She denies urinary urgency and frequency, abdominal pain, bloating, constipation, unusual vaginal discharge and early satiety.      Past Medical/Surgical History:  Past Medical History:   Diagnosis Date     BRCA2 positive     has had bilateral mastectomies and reconstruction, still has ovaries     PONV (postoperative nausea and vomiting)     Unsure if this was due to anesthesia and/or vicodin.     Past Surgical History:   Procedure Laterality Date     BREAST BIOPSY, RT/LT Right 2015    benign      SECTION  , 2013     x 2     COSMETIC SURGERY  2016, 2016    Prophylactic bilateral mastectomy and reconstruction     GRAFT FAT TO BREAST Bilateral 2017    Procedure: GRAFT FAT TO BREAST;  Bilateral Breast mastopexy and Fat Grafting from Abdomen;  Surgeon: SUZY Arora MD;  Location: [facility]     MASTECTOMY, NIPPLE SPARING Bilateral 6/15/2016    Procedure: MASTECTOMY, NIPPLE SPARING;  Surgeon: Nely Mcqueen MD;  Location: [facility] OR     RECONSTRUCT BREAST BILATERAL Bilateral 6/15/2016    Procedure: RECONSTRUCT BREAST BILATERAL;  Surgeon: SUZY Arora MD;  Location: [facility] OR     RECONSTRUCT BREAST SECOND STAGE BILATERAL N/A 9/15/2016    Procedure: RECONSTRUCT BREAST SECOND STAGE BILATERAL;  Surgeon: SUZY Arora MD;  Location: [facility] OR     TONSILLECTOMY & ADENOIDECTOMY       [facility]         Allergies:  Allergies as of 01/15/2021 - Reviewed 2020   Allergen Reaction Noted     Percocet [oxycodone-acetaminophen] Nausea and Vomiting 2015     Vicodin [hydrocodone-acetaminophen] Nausea and Vomiting " 2010     Tramadol Nausea and Vomiting 2018       Current Medications:  Current Outpatient Medications   Medication Sig Dispense Refill     magnesium 250 MG tablet Take 1 tablet by mouth daily       Multiple Vitamin (MULTIVITAMINS PO)        valACYclovir (VALTREX) 1000 mg tablet Take 2 tablets (2,000 mg) by mouth 2 times daily for 1 day 4 tablet 4        Family History:  Family History   Problem Relation Age of Onset     Breast Cancer Mother 49        BRCA2 genetic mutation     Pre-Diabetes Mother      Other Cancer Maternal Grandfather 60        Prostate and throat     Prostate Cancer Maternal Grandfather      Genetic Disorder Maternal Aunt         BRCA2 genetic mutation     Cancer Maternal Uncle 70        3 uncles (all 70s and 80s)     Breast Cancer Other 30        maternal cousin; BRCA2 genetic mutation     Other Cancer Other 60        maternal cousin; prostate cancer     Breast Cancer Maternal Aunt 70     Breast Cancer Other 40        maternal cousin     Cancer - colorectal Other 70        Maternal great grandfather     Cancer - colorectal Paternal Uncle 40         in 40s     Breast Cancer Other      Breast Cancer Cousin      Colon Cancer Other      Diabetes Other      Other Cancer Other        Social History:  Social History     Socioeconomic History     Marital status:      Spouse name: Danny     Number of children: 2     Years of education: Not on file     Highest education level: Not on file   Occupational History     Occupation: [employer]     Employer: SELF   Social Needs     Financial resource strain: Not on file     Food insecurity     Worry: Not on file     Inability: Not on file     Transportation needs     Medical: Not on file     Non-medical: Not on file   Tobacco Use     Smoking status: Never Smoker     Smokeless tobacco: Never Used   Substance and Sexual Activity     Alcohol use: Yes     Alcohol/week: 0.0 standard drinks     Drug use: No     Sexual activity: Yes     Partners: Male      Birth control/protection: Male Surgical     Comment: vasectomy   Lifestyle     Physical activity     Days per week: Not on file     Minutes per session: Not on file     Stress: Not on file   Relationships     Social connections     Talks on phone: Not on file     Gets together: Not on file     Attends Roman Catholic service: Not on file     Active member of club or organization: Not on file     Attends meetings of clubs or organizations: Not on file     Relationship status: Not on file     Intimate partner violence     Fear of current or ex partner: Not on file     Emotionally abused: Not on file     Physically abused: Not on file     Forced sexual activity: Not on file   Other Topics Concern     Parent/sibling w/ CABG, MI or angioplasty before 65F 55M? Not Asked      Service Not Asked     Blood Transfusions Not Asked     Caffeine Concern No     Occupational Exposure No     Hobby Hazards No     Sleep Concern No     Stress Concern Yes     Weight Concern Yes     Comment: in the process of getting back to prepregnancy weight     Special Diet No     Back Care Not Asked     Exercise No     Comment: keeping busy with 2 children under the age of 4; exercising using high intensity interval classes     Bike Helmet Not Asked     Seat Belt Not Asked     Self-Exams Yes   Social History Narrative     Not on file       Physical Exam:  There were no vitals taken for this visit.  GENERAL: Healthy, alert and no distress  EYES: Eyes grossly normal to inspection.  No discharge or erythema, or obvious scleral/conjunctival abnormalities.  RESP: No audible wheeze, cough, or visible cyanosis.  No visible retractions or increased work of breathing.    SKIN: Visible skin clear. No significant rash, abnormal pigmentation or lesions.  NEURO: Cranial nerves grossly intact.  Mentation and speech appropriate for age.  PSYCH: Mentation appears normal, affect normal/bright, judgement and insight intact, normal speech and appearance  well-groomed.    Laboratory/Imaging Studies  No results found for any visits on 01/15/21.    ASSESSMENT  We discussed that she is having her nipples removed in an outpatient procedure on 2/12 with Dr. Arora, because they have been tender ever since her prophylactic mastectomy in 2016. She also complains of a lump in her right axillary area, which she is concerned is a lymph node.  She has had this examined in August 2019 with an ultrasound.  I will order her another ultrasound today to re-assess. We discussed the possibility of scar tissue, fat necrosis and enlarged lymph nodes for benign causes.     We also reviewed her family history.  There are no new cancers to report    Her other screening include:  Dermatology, last seen 9/18/2020 (Dr. Pat Ross, [facility]); her next is due September 2021  Ophthalmology/optometry exam - October 2020; her next is due October 2021    INDIVIDUALIZED CANCER RISK MANAGEMENT PLAN:  Individualized Surveillance Plan for women  Hereditary Breast and/or Ovarian Cancer Syndrome   Per NCCN Guidelines Version 1.2020   Recommended screening Test or procedure Last done Next Scheduled    Breast self awareness starting at age 18. Women should be familiar with their breasts and promptly report changes to their care provider. Periodic, consistent self exam may facilitate breast self awareness. Premenopausal women may find self exams to be most informative when performed at the end of menses.     1/15/2021     Júnior. 2022   Breast screening, starting at age 25 Clinical breast exams every 6 -12 months Exam deferred Planning removal of nipples with Dr. Arora on 2/12/2021   Breast screening   Age 25-29 Annual breast MRI screening with contrast (or mammogram if MRI is unavailable) or individualized based on family history if a breast cancer diagnosis before age 30 is present.       Breast MRI is performed preferably on day 7-15 of menstrual cycle for premenopausal women.     NA     NA    Breast screening   Age >30-75 years     Annual mammogram (consider tomosynthesis mammogram) and annual screening MRI.     Breast MRI is performed preferably on day 7-15 of menstrual cycle for premenopausal women.    Age>75 years, management should be considered on an individual basis.   Prophylactic mastectomy 2016 US of right axillary area ordered to follow up on palpable lump   Ovarian cancer screening, starting at age 30-35 Consider transvaginal ultrasound and  tests. 1/13/2021- Transvaginal ultrasound: Endometrial stripe remains upper normal limits to slightly  thickened given LMP 12/29/2020 but is similar to prior studies and is  otherwise unremarkable. Ovaries within normal limits with an incidental corpus luteum in the right ovary noted. July 2021    Repeat in January 2022   Pancreatic Cancer Screening beginning at age 50 or 10 years prior to the earliest pancreatic cancer on the BRCA-side of the family  In families with exocrine pancreatic cancer in a first or second degree relative     Consider Annual MRI/MRCP and/or Endoscopic Ultrasound     No family history of pancreatic cancer   Review at subsequent visits   Recommendation: risk-reducing salpingo-oophorectomy(RRSO), typically between 35 and 40 years old and  upon completion of child bearing.     Because ovarian cancer onset in patients with BRCA2 mutations is on average of 8-10 years later than in patients with BRCA1 mutations, it is reasonable to delay RRSO until 40-45 years in patients with BRCA2 mutations  unless age at diagnosis in the family warrants earlier age for consideration for prophylactic surgery.    Limited data suggest that there may be a slightly increased risk of serous uterine cancer among women with BRCA1+ pathogenic variants. The provider and the patient should discuss the risks and benefits of a concurrent hysterectomy at the time of RRSO for women with a BRCA1+ pathogenic variant /like pathogenic variant prior to surgery.      Salpingectomy alone is not the standard of care for risk reduction although clinical trials are ongoing.     Discuss option of risk-reducing mastectomy.    Consider risks and benefits of risk reducing agents, such as tamoxifen and raloxifene for breast and ovarian cancer.    Consider a full body skin and eye exam for melanoma screening for both BRCA1+ and BRCA2+.     NOTE: Women with BRCA mutation who are treated for breast cancer should have screening of the remaining breast tissue with annual mammography and breast MRI.         I spent 20 minutes with the patient with greater that 50% of it in counseling and coordinating care as documented above.    Cate Castro, APRN-CNS, OCN, AGN-BC  Clinical Nurse Specialist  Cancer Risk Management Program  [facility] [address]  [place] [address]    phone:  [phone]  Pager: [phone]  fax: [phone]    CC: Shameka Tovar MD

## 2021-01-21 ENCOUNTER — HOSPITAL ENCOUNTER (OUTPATIENT)
Dept: MAMMOGRAPHY | Facility: CLINIC | Age: 36
Discharge: HOME | End: 2021-01-21
Attending: CLINICAL NURSE SPECIALIST | Admitting: CLINICAL NURSE SPECIALIST
Payer: COMMERCIAL

## 2021-01-21 DIAGNOSIS — Z15.01 HEREDITARY BREAST AND OVARIAN CANCER SYNDROME ASSOCIATED WITH MUTATION IN BRCA2 GENE: ICD-10-CM

## 2021-01-21 DIAGNOSIS — R22.9 LOCALIZED SUPERFICIAL SWELLING, MASS, OR LUMP: ICD-10-CM

## 2021-01-21 DIAGNOSIS — Z15.02 HEREDITARY BREAST AND OVARIAN CANCER SYNDROME ASSOCIATED WITH MUTATION IN BRCA2 GENE: ICD-10-CM

## 2021-01-21 PROCEDURE — 76882 US LMTD JT/FCL EVL NVASC XTR: CPT | Mod: RT

## 2021-02-05 ENCOUNTER — OFFICE VISIT (OUTPATIENT)
Dept: FAMILY MEDICINE | Facility: CLINIC | Age: 36
End: 2021-02-05
Payer: COMMERCIAL

## 2021-02-05 VITALS
HEART RATE: 86 BPM | SYSTOLIC BLOOD PRESSURE: 101 MMHG | WEIGHT: 175 LBS | DIASTOLIC BLOOD PRESSURE: 60 MMHG | TEMPERATURE: 98.1 F | BODY MASS INDEX: 28.12 KG/M2 | HEIGHT: 66 IN | OXYGEN SATURATION: 98 %

## 2021-02-05 DIAGNOSIS — M54.2 NECK PAIN: ICD-10-CM

## 2021-02-05 DIAGNOSIS — R42 VERTIGO: Primary | ICD-10-CM

## 2021-02-05 PROCEDURE — 99214 OFFICE O/P EST MOD 30 MIN: CPT | Performed by: INTERNAL MEDICINE

## 2021-02-05 ASSESSMENT — MIFFLIN-ST. JEOR: SCORE: 1500.54

## 2021-02-05 ASSESSMENT — ENCOUNTER SYMPTOMS
SPEECH DIFFICULTY: 0
DIZZINESS: 1
WEAKNESS: 0
SHORTNESS OF BREATH: 0
FEVER: 0
CHILLS: 0
WHEEZING: 0
HEARTBURN: 0
HEADACHES: 1
LIGHT-HEADEDNESS: 1
CHEST TIGHTNESS: 0
FACIAL ASYMMETRY: 0
FATIGUE: 0
NAUSEA: 1
VOMITING: 0
PHOTOPHOBIA: 0

## 2021-02-05 NOTE — PROGRESS NOTES
"  Assessment & Plan     Vertigo  Discussed vertigo symptoms at length. Dizziness is continued to be occuring more frequently and with more triggers. Not particularly consistent with BPPV, vestibulitis. Meniere's, also no symptoms concerning for a central cause.  Recommended she see ENT for further eval.  Continue PT for neck, also will try for vestibular therapy.  Can try prn valium for significant vertigo, reviewed side effects.   - OTOLARYNGOLOGY REFERRAL  - PHYSICAL THERAPY REFERRAL; Future          Return in about 2 weeks (around 2/19/2021) for with ENT .    Roxana Loya MD  [facility] RENETTA Zarate is a 35 year old who presents to clinic today for the following health issues       HPI     Patient reports worsening dizziness and vertigo-like symptoms over the past few months and days.    Reports she had initial BPPV episode with dizziness and nausea in 2018, was seen by Vestibular PT, which resolved her symptoms in one visit.     [place] with a severe episode of dizziness \"world spinning\" with significant nausea, she was able to see Vestibular PT for 3 visits which helped relieve that episode. However, since there, she is very sensitive to some head movement (looking up quickly) which causes significant dizziness. Dizziness provoked by bending down and standing up again. She has done PT maneuvers at home but these are no longer working. She controls her head movements to limit these occurrences, which has been causing significant neck stiffness and tension headaches. She has been seeing PT for this but nor currently for Vestibular therapy.     This week, she has been having significant dizziness/nausea with turning over in bed or sitting up in bed, causing significant interruptions in sleep. Has tried dramamine, tylenol for next pain and motion sickness. Meclizine does not help. Denies recent falls, head injury, syncope, or " "signs of infection. Denies weakness, speech changes, blurry vision, hearing loss, tinnitis, chest pain, or palpitations.     FMH:  Paternal gradma mineres disease/hearing loss  Maternal grandma vertigo      Dizziness  Onset/Duration: since last summer   Description:   Do you feel faint: no  Does it feel like the surroundings (bed, room) are moving: no  Unsteady/off balance: YES  Have you passed out or fallen: no  Intensity: moderate  Progression of Symptoms: worsening  Accompanying Signs & Symptoms:  Heart palpitations or chest pain: no  Nausea, vomiting: no  Weakness or lack of coordination in arms or legs: no  Vision or speech changes: no  Numbness or tingling: no  Ringing in ears (Tinnitus): no  Hearing Loss: no  History:   Head trauma/concussion history: yes  20 yrs go   Previous similar symptoms: YES  Recent bleeding history: no  Any new medications (BP?): no  Precipitating factors:   Worse with activity: YES  Worse with head movement: YES  Alleviating factors:   Does staying in a fixed position give relief: no   Therapies tried and outcome: None        Review of Systems   Constitutional: Negative for chills, fatigue and fever.   HENT: Negative for ear pain, hearing loss and tinnitus.    Eyes: Negative for photophobia and visual disturbance.   Respiratory: Negative for chest tightness, shortness of breath and wheezing.    Gastrointestinal: Positive for nausea. Negative for heartburn and vomiting.   Neurological: Positive for dizziness, light-headedness and headaches. Negative for facial asymmetry, speech difficulty and weakness.            Objective    /60   Pulse 86   Temp 98.1  F (36.7  C) (Tympanic)   Ht 1.676 m (5' 6\")   Wt 79.4 kg (175 lb)   SpO2 98%   BMI 28.25 kg/m    Body mass index is 28.25 kg/m .  Physical Exam   GENERAL APPEARANCE: healthy, alert and no distress  EYES: Eyes grossly normal to inspection, PERRL, conjunctivae and sclerae normal, visual fields normal and no nystagmus " noted  HENT: ear canals and TM's normal  RESP: lungs clear to auscultation - no rales, rhonchi or wheezes  CV: regular rates and rhythm, normal S1 S2, no S3 or S4 and no murmur, click or rub

## 2021-02-07 RX ORDER — DIAZEPAM 2 MG
2-4 TABLET ORAL EVERY 6 HOURS PRN
Qty: 12 TABLET | Refills: 0 | Status: SHIPPED | OUTPATIENT
Start: 2021-02-07 | End: 2021-05-14

## 2021-02-10 ENCOUNTER — TELEPHONE (OUTPATIENT)
Dept: OTOLARYNGOLOGY | Facility: CLINIC | Age: 36
End: 2021-02-10

## 2021-02-10 NOTE — TELEPHONE ENCOUNTER
[facility] Call Center    Phone Message    May a detailed message be left on voicemail: no - pt submitted online request    Reason for Call: Appointment Intake    Referring Provider Name: Roxana Loya MD in  FP/IM/PEDS  Diagnosis and/or Symptoms: Vertigo    Action Taken: Message routed to:  [facility] CLINIC [facility]ORDINATORS-EYE-DENTAL-ENT- [[id number]]    Travel Screening: Not Applicable      Create Date/Time: 2021-02-09 04:12:38   Preferred Follow Up: Phone   [facility]: [phone]   Email: [email or web address]     Diagnosis/Appointment Reason: ENT-Vertigo   Preferred Appointment Day/Time: left blank   Preferred Clinic/Location: [place]     Website Requested From: None

## 2021-03-23 ENCOUNTER — TELEPHONE (OUTPATIENT)
Dept: ORTHOPEDICS | Facility: CLINIC | Age: 36
End: 2021-03-23

## 2021-03-23 NOTE — TELEPHONE ENCOUNTER
3/23 Called and left voicemail. Provided phone number [phone] to schedule procedure from 3/26 to may 14th at 3 p.m.     Briana Robles   Procedure    Ortho/Sports Med/Pod/Ent/Eye  [facility]   [phone]      ----- Message from Fiorella Bernabe LPN sent at 3/23/2021  2:19 PM CDT -----  Tadeo Warren,    The next available procedure slot on May 14th at 3pm. You can add her there.    Thanks!    Fiorella  ----- Message -----  From: Briana Robles  Sent: 3/23/2021   8:13 AM CDT  To: Fiorella Bernabe LPN    The call center transferred me a call...    This patient needs to reschedule 3/26 appointment I already cancelled it.     I tried to reschedule but could not find anything soon     Can you help? Or give me some days and times?     Thanks     Briana Robles   Procedure    Ortho/Sports Med/Pod/Ent/Eye  [facility]   [phone]

## 2021-03-31 ENCOUNTER — TELEPHONE (OUTPATIENT)
Dept: ONCOLOGY | Facility: CLINIC | Age: 36
End: 2021-03-31

## 2021-05-14 ENCOUNTER — OFFICE VISIT (OUTPATIENT)
Dept: SURGERY | Facility: CLINIC | Age: 36
End: 2021-05-14
Payer: COMMERCIAL

## 2021-05-14 DIAGNOSIS — Z90.13 S/P BILATERAL MASTECTOMY: Primary | ICD-10-CM

## 2021-05-14 PROCEDURE — 11403 EXC TR-EXT B9+MARG 2.1-3CM: CPT | Performed by: PLASTIC SURGERY

## 2021-05-14 PROCEDURE — 88305 TISSUE EXAM BY PATHOLOGIST: CPT | Performed by: PATHOLOGY

## 2021-05-14 ASSESSMENT — PAIN SCALES - GENERAL: PAINLEVEL: NO PAIN (0)

## 2021-05-14 NOTE — LETTER
5/14/2021         RE: Tessa Ospina  [address]  [facility] [address]        Dear Colleague,    Thank you for referring your patient, Tessa Ospina, to the [facility]. Please see a copy of my visit note below.    PRESENTING COMPLAINT:  Followup visit status post bilateral breast reconstruction for breast cancer with bilateral exquisitely tender nipples, requiring removal.    HISTORY OF PRESENTING COMPLAINT:  Ms. Ospina is 35 years old, here to have her nipples removed.  No change in history and physical exam.  All risks, benefits and alternatives of the procedure were explained.  She understood them all and wants to proceed.  She understands that I cannot guarantee all her pain will go away.    PROCEDURE NOTE:  After informed consent was obtained from the patient, the proper site and procedure were ascertained with her, and she was appropriately marked, she was taken to the procedure room.  She was prepped and draped in standard surgical fashion.  1% lidocaine with 1:100,000 epinephrine was injected in both areolas.  I waited 10 minutes for the medication to take medication to take effect.  I then cut vertically down the nipple, leaving 10% of the nipple attached.  The remainder was removed off of the base, sent off the table as specimens on each side.  The 10% flap was then thinned down to removal of tissue except for the skin.  Hemostasis was ensured and then both flaps were turned down in interrupted simple fashion and closed with 4-0 Monocryl suture.  Dry dressing placed on each side.  The patient tolerated the procedure well.  All counts were correct at the end of the case.  The patient will see me back in 2-3 weeks.    Total time spent with chart review, visit itself, procedure and post-visit paperwork was 20 minutes.          Again, thank you for allowing me to participate in the care of your patient.        Sincerely,        SUZY Arora MD

## 2021-05-14 NOTE — NURSING NOTE
The following medication was given:     MEDICATION:  Lidocaine with epinephrine 1% 1:[id number]  ROUTE: [place]  SITE: see procedure note  DOSE: see procedure note  LOT #: -[id number]  : [employer]  EXPIRATION DATE: 2/1/2022  NDC#: 8638-4083-10     Was there drug waste? 5c's  Multi-dose vial: Yes    Fiorella Bernabe LPN  May 14, 2021

## 2021-05-14 NOTE — PROGRESS NOTES
PRESENTING COMPLAINT:  Followup visit status post bilateral breast reconstruction for breast cancer with bilateral exquisitely tender nipples, requiring removal.    HISTORY OF PRESENTING COMPLAINT:  Ms. Ospina is 35 years old, here to have her nipples removed.  No change in history and physical exam.  All risks, benefits and alternatives of the procedure were explained.  She understood them all and wants to proceed.  She understands that I cannot guarantee all her pain will go away.    PROCEDURE NOTE:  After informed consent was obtained from the patient, the proper site and procedure were ascertained with her, and she was appropriately marked, she was taken to the procedure room.  She was prepped and draped in standard surgical fashion.  1% lidocaine with 1:100,000 epinephrine was injected in both areolas.  I waited 10 minutes for the medication to take medication to take effect.  I then cut vertically down the nipple, leaving 10% of the nipple attached.  The remainder was removed off of the base, sent off the table as specimens on each side.  The 10% flap was then thinned down to removal of tissue except for the skin.  Hemostasis was ensured and then both flaps were turned down in interrupted simple fashion and closed with 4-0 Monocryl suture.  Dry dressing placed on each side.  The patient tolerated the procedure well.  All counts were correct at the end of the case.  The patient will see me back in 2-3 weeks.    Total time spent with chart review, visit itself, procedure and post-visit paperwork was 20 minutes.

## 2021-05-14 NOTE — PATIENT INSTRUCTIONS
Excision Wound Care Instructions  I will experience scar, altered skin color, bleeding, swelling, pain, crusting and redness. I may experience altered sensation. Risks are excessive bleeding, infection, muscle weakness, thick (hypertrophic or keloidal) scar, and recurrence,. A second procedure may be recommended to obtain the best cosmetic or functional result.  After your surgery, Dermabond may be placed over the area that has sutures and a dry dressing. Please follow these instructions, as they will help you to prevent complications as your wound heals.  For the First 48 hours After Surgery:  1. If  pressure bandage used keep it on and keep it dry. If it should come loose, you may retape it, but do not take it off.  2. Relax and take it easy. Do not do any vigorous exercise, heavy lifting, or bending forward. This could cause the wound to bleed.  3. Post-operative pain is usually mild. You may take plain or extra strength Tylenol every 4 hours as needed (do not take more than 4,000mg in one day). Do not take any medicine that contains aspirin, ibuprofen or motrin unless you have been recommended these by a doctor.  Avoid alcohol and vitamin E as these may increase your tendency to bleed.  4. You may put an ice pack around the bandaged area for 20 minutes every 2-3 hours. This may help reduce swelling, bruising, and pain. Make sure the ice pack is waterproof so that the pressure bandage does not get wet.   48 Hours After Surgery  If dressing is present carefully remove. You may also now shower and get the wound wet. Wash wound with a mild soap and water.  Use caution when washing the wound. Be gentle and do not let the forceful shower stream hit the wound directly.  PAT dry.  Daily Wound Care:  1. Wash wound with a mild soap and water.  Use caution when washing the wound, be gentle and do not let the forceful shower stream hit the wound directly.  2. PAT DRY.  3. After one week start moisturizing the site with  Vaseline or Aquafore  4. No tub soaking, bathing or swimming while sutures are in place or until after follow up appt.      Call Us If:  1. You have pain that is not controlled with Tylenol.  2. You have signs or symptoms of an infection, such as: fever over 100 degrees F, redness, warmth, or foul-smelling or yellow/creamy drainage from the wound.  Who should I call with questions?    [facility]: [phone]     [facility]: [phone]    For urgent needs outside of business hours call the [facility] at [phone] and ask for the dermatology resident on call

## 2021-05-14 NOTE — NURSING NOTE
Tessa Ospina's goals for this visit include:   Chief Complaint   Patient presents with     Procedure     She requests these members of her care team be copied on today's visit information: Cate Castro    PCP: Shameka Tovar    Referring Provider:  No referring provider defined for this encounter.    There were no vitals taken for this visit.    Do you need any medication refills at today's visit? n/a

## 2021-05-21 LAB — COPATH REPORT: NORMAL

## 2021-05-24 ENCOUNTER — TELEPHONE (OUTPATIENT)
Dept: SURGERY | Facility: CLINIC | Age: 36
End: 2021-05-24

## 2021-05-24 NOTE — TELEPHONE ENCOUNTER
Pt called, no answer. VM Id's pt. Left detailed message with reason for call and  for pt to call the [facility] back at [phone].    Fiorella Bernabe LPN

## 2021-05-24 NOTE — TELEPHONE ENCOUNTER
----- Message from SUZY Arora MD sent at 5/21/2021  5:24 PM CDT -----  Regarding: Path  Hi    Please let her know path was benign    Hank Fritz

## 2021-09-17 DIAGNOSIS — Z15.09 BRCA2 POSITIVE: Primary | ICD-10-CM

## 2021-09-17 DIAGNOSIS — Z15.01 BRCA2 POSITIVE: Primary | ICD-10-CM

## 2021-09-17 DIAGNOSIS — Z91.89 AT RISK FOR CANCER: ICD-10-CM

## 2021-09-20 ENCOUNTER — HOSPITAL ENCOUNTER (OUTPATIENT)
Dept: ULTRASOUND IMAGING | Facility: CLINIC | Age: 36
Discharge: HOME | End: 2021-09-20
Attending: CLINICAL NURSE SPECIALIST | Admitting: CLINICAL NURSE SPECIALIST
Payer: COMMERCIAL

## 2021-09-20 DIAGNOSIS — Z15.01 BRCA2 POSITIVE: ICD-10-CM

## 2021-09-20 DIAGNOSIS — Z91.89 AT RISK FOR CANCER: ICD-10-CM

## 2021-09-20 DIAGNOSIS — Z15.09 BRCA2 POSITIVE: ICD-10-CM

## 2021-09-20 PROCEDURE — 76830 TRANSVAGINAL US NON-OB: CPT

## 2021-09-25 ENCOUNTER — HEALTH MAINTENANCE LETTER (OUTPATIENT)
Age: 36
End: 2021-09-25

## 2021-11-08 ENCOUNTER — OFFICE VISIT (OUTPATIENT)
Dept: OBGYN | Facility: CLINIC | Age: 36
End: 2021-11-08
Payer: COMMERCIAL

## 2021-11-08 VITALS
SYSTOLIC BLOOD PRESSURE: 106 MMHG | WEIGHT: 176.9 LBS | DIASTOLIC BLOOD PRESSURE: 63 MMHG | HEART RATE: 95 BPM | BODY MASS INDEX: 28.55 KG/M2

## 2021-11-08 DIAGNOSIS — Z15.09 BRCA2 GENE MUTATION POSITIVE: ICD-10-CM

## 2021-11-08 DIAGNOSIS — Z15.01 BRCA2 GENE MUTATION POSITIVE: ICD-10-CM

## 2021-11-08 DIAGNOSIS — Z01.419 WELL WOMAN EXAM WITH ROUTINE GYNECOLOGICAL EXAM: Primary | ICD-10-CM

## 2021-11-08 DIAGNOSIS — Z12.4 SCREENING FOR MALIGNANT NEOPLASM OF CERVIX: ICD-10-CM

## 2021-11-08 DIAGNOSIS — N93.9 ABNORMAL UTERINE BLEEDING (AUB): ICD-10-CM

## 2021-11-08 LAB
CANCER AG125 SERPL-ACNC: 12 U/ML (ref 0–30)
HGB BLD-MCNC: 13.2 G/DL (ref 11.7–15.7)
PROLACTIN SERPL-MCNC: 7 UG/L (ref 3–27)
TSH SERPL DL<=0.005 MIU/L-ACNC: 1.47 MU/L (ref 0.4–4)

## 2021-11-08 PROCEDURE — 86304 IMMUNOASSAY TUMOR CA 125: CPT | Performed by: OBSTETRICS & GYNECOLOGY

## 2021-11-08 PROCEDURE — 87624 HPV HI-RISK TYP POOLED RSLT: CPT | Performed by: OBSTETRICS & GYNECOLOGY

## 2021-11-08 PROCEDURE — 99395 PREV VISIT EST AGE 18-39: CPT | Performed by: OBSTETRICS & GYNECOLOGY

## 2021-11-08 PROCEDURE — 36415 COLL VENOUS BLD VENIPUNCTURE: CPT | Performed by: OBSTETRICS & GYNECOLOGY

## 2021-11-08 PROCEDURE — G0145 SCR C/V CYTO,THINLAYER,RESCR: HCPCS | Performed by: OBSTETRICS & GYNECOLOGY

## 2021-11-08 PROCEDURE — 84146 ASSAY OF PROLACTIN: CPT | Performed by: OBSTETRICS & GYNECOLOGY

## 2021-11-08 PROCEDURE — 85018 HEMOGLOBIN: CPT | Performed by: OBSTETRICS & GYNECOLOGY

## 2021-11-08 PROCEDURE — 84443 ASSAY THYROID STIM HORMONE: CPT | Performed by: OBSTETRICS & GYNECOLOGY

## 2021-11-08 PROCEDURE — 99213 OFFICE O/P EST LOW 20 MIN: CPT | Mod: 25 | Performed by: OBSTETRICS & GYNECOLOGY

## 2021-11-08 NOTE — PROGRESS NOTES
SUBJECTIVE:       HPI: Tessa Ospina is a 36 year old  who presents today for WWE.     Known BRCA2+  -s/p b/l mastectomy with reconstruction (pathology benign)  -Follows with oncology  -q6mo pelvic ultrasound and : Last pelvic ultrasound 2021 and unremarkable.  14 (2020)  -Undecided about prophylactic BSO, would like to avoid surgery at this time    She denies vaginal discharge, dyspareunia. She denies fevers/chills, nausea/vomiting, abdominal pain or bloating. Denies dysuria, hematuria, +occasional constipation or diarrhea.        Ob Hx:  s/p CSx2.      Gyn Hx: Patient's last menstrual period was 10/27/2021.    Patient is sexually active. One male partner   Last pap was 2019 nil, neg hpv. Next pap due 2024   STI history denies  Using vasectomy for contraception.   STD testing offered?  Declined  Menarche 13 years old. Menses every 23-25 days. heavy flow, lasting about 4 days (heavy for 2 days). Using diva cup, fills in about an hour on heavy days). Takes aleve, which helps. Menses have been like this since birth of last child.       reports that she has never smoked. She has never used smokeless tobacco.      Today's PHQ-2 Score:   PHQ-2 (  [employer]) 2021   Q1: Little interest or pleasure in doing things 0   Q2: Feeling down, depressed or hopeless 0   PHQ-2 Score 0   Q1: Little interest or pleasure in doing things -   Q2: Feeling down, depressed or hopeless -   PHQ-2 Score -     Today's PHQ-9 Score:   PHQ-9 SCORE 11/3/2020   PHQ-9 Total Score 1     Today's BRITT-7 Score:   BRITT-7 SCORE 11/3/2020   Total Score -   Total Score 1       Problem list and histories reviewed & adjusted, as indicated.  Additional history: as documented.    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     S/P bilateral mastectomy     Status post bilateral breast reconstruction      White classification A2 gestational diabetes mellitus (GDM)     ACL (anterior cruciate ligament) rupture     Past Surgical History:   Procedure Laterality Date     BREAST BIOPSY, RT/LT Right 2015    benign      SECTION  , 2013     x 2     COSMETIC SURGERY  2016, 2016    Prophylactic bilateral mastectomy and reconstruction     GRAFT FAT TO BREAST Bilateral 2017    Procedure: GRAFT FAT TO BREAST;  Bilateral Breast mastopexy and Fat Grafting from Abdomen;  Surgeon: SUZY Arora MD;  Location: [facility]     MASTECTOMY, NIPPLE SPARING Bilateral 6/15/2016    Procedure: MASTECTOMY, NIPPLE SPARING;  Surgeon: Nely Mcqueen MD;  Location: [facility] OR     RECONSTRUCT BREAST BILATERAL Bilateral 6/15/2016    Procedure: RECONSTRUCT BREAST BILATERAL;  Surgeon: SUZY Arora MD;  Location: [facility] OR     RECONSTRUCT BREAST SECOND STAGE BILATERAL N/A 9/15/2016    Procedure: RECONSTRUCT BREAST SECOND STAGE BILATERAL;  Surgeon: SUZY Arora MD;  Location: [facility] OR     TONSILLECTOMY & ADENOIDECTOMY  [address] [place] [address]        Social History     Tobacco Use     Smoking status: Never Smoker     Smokeless tobacco: Never Used   Substance Use Topics     Alcohol use: Yes     Alcohol/week: 0.0 standard drinks      Problem (# of Occurrences) Relation (Name,Age of Onset)    Breast Cancer (6) Mother (Maryann Waters, 49): BRCA2 genetic mutation, Other (30): maternal cousin; BRCA2 genetic mutation, Maternal Aunt (70), Other (40): maternal cousin, Other (Great aunt), Cousin (Cousin)    Cancer (1) Maternal Uncle (70): 3 uncles (all 70s and 80s)    Cancer - colorectal (2) Other (70): Maternal great grandfather, Paternal Uncle (40):  in 40s    Colon Cancer (1) Other (Uncle)    Diabetes (1) Other (Uncle)    Genetic Disorder (1) Maternal Aunt: BRCA2 genetic mutation    Meniere's disease (1) Paternal Grandmother    Other Cancer (3) Maternal Grandfather (Max Villar  60): Prostate and throat, Other (60): maternal cousin; prostate cancer, Other (4 Great Uncles)    Pre-Diabetes (1) Mother (Maryann Waters)    Prostate Cancer (1) Maternal Grandfather (Max Villar)    Vertigo (1) Maternal Grandmother            magnesium 250 MG tablet, Take 1 tablet by mouth daily  Multiple Vitamin (MULTIVITAMINS PO),   valACYclovir (VALTREX) 1000 mg tablet, TAKE 2 TABLETS BY MOUTH 2 TIMES DAILY FOR 1 DAY  predniSONE (DELTASONE) 10 MG tablet,     No current facility-administered medications on file prior to visit.    Allergies   Allergen Reactions     Percocet [Oxycodone-Acetaminophen] Nausea and Vomiting     Vicodin [Hydrocodone-Acetaminophen] Nausea and Vomiting     Sensitivity to narcotics.     Tramadol Nausea and Vomiting       ROS:  10 Point review of systems negative other noted above in HPI    OBJECTIVE:     /63   Pulse 95   Wt 80.2 kg (176 lb 14.4 oz)   LMP 10/27/2021   BMI 28.55 kg/m    Body mass index is 28.55 kg/m .      Gen: Alert, oriented, appropriately interactive, NAD  Eyes: Eyes grossly normal to inspection, conjunctivae and sclerae normal  CV: No edema  Resp: no audible wheeze, cough, or visible cyanosis.  No visible retractions or increased work of breathing.  Able to speak fully in complete sentences.  Breasts: no axillary adenopathy, no dominant masses, no skin changes. S/p b/l mastectomy with reconstruction  Abdomen: soft, non tender, non distended, no masses, no hernias. No inguinal lymphadenopathy.   External genitalia: no lesions; normal appearing external genitalia, bartholins glands, urethra, skenes glands  Vagina: no masses or lesions or discharge, normally rugated.  Cervix: no masses or lesions or discharge   Bimanual exam:   Nontender pelvic floor muscles  Urethra: nontender   Bladder: nontender and without massess, well supported   Uterus: midline, anteverted, small, mobile  no masses, non-tender  Adnexa: no masses or tenderness appreciated   No  cervical motion tenderness  MSK: normal gait, symmetric movements UE & LE  Lower extremities: non-tender, no edema  Neuro: Cranial nerves grossly intact, mentation intact and speech normal  Psych: mentation appears normal, affect normal/bright, judgement and insight intact, normal speech and appearance well-groomed      In-Clinic Test Results:  No results found for this or any previous visit (from the past 24 hour(s)).    ASSESSMENT/PLAN:                                                      Tessa Ospina is a 36 year old  who presents today for WWE, BRCA2 carrier, AUB      ICD-10-CM    1. Well woman exam with routine gynecological exam  Z01.419    2. BRCA2 gene mutation positive  Z15.01     Z15.09    3. Screening for malignant neoplasm of cervix  Z12.4 Pap screen with HPV - recommended age 30 - 65 years   4. Abnormal uterine bleeding (AUB)  N93.9 Hemoglobin     TSH with free T4 reflex     Prolactin     Hemoglobin     TSH with free T4 reflex     Prolactin       Pap - not due but would like done   STI screen- declines  Contraception- Vasectomy  Screening mammogram- NA  Flu vaccine- Today  COVID vaccine- Booster today    BRCA2, following with oncology. S/p b/l mastectomy with reconstruction. Bi-annual pelvic ultrasound and  recommended previously. Overdue for , would like ordered today. Recent pelvic ultrasound unremarkable. Discussed risk-reducing surgery, patient leaning towards hysterectomy at time of BSO when ready. Currently not ready for surgery and concerned about menopause SE, as well as recommending against HRT as first line management.     AUB with regular cycle. Base labs ordered to r/o other etiologies, anemia. Options for management reviewed, including medical and surgical treatment. Patient possibly interested in starting OCPs, will let us know if this is something she wants sent in. Eventually considering hysterectomy with BSO given BRCA2.    All questions answered  Shameka  DO SUZY [facility]

## 2021-11-10 LAB
BKR LAB AP GYN ADEQUACY: NORMAL
BKR LAB AP GYN INTERPRETATION: NORMAL
BKR LAB AP HPV REFLEX: NORMAL
BKR LAB AP LMP: NORMAL
BKR LAB AP PREVIOUS ABNORMAL: NORMAL
PATH REPORT.COMMENTS IMP SPEC: NORMAL
PATH REPORT.COMMENTS IMP SPEC: NORMAL
PATH REPORT.RELEVANT HX SPEC: NORMAL

## 2021-11-11 LAB
HUMAN PAPILLOMA VIRUS 16 DNA: NEGATIVE
HUMAN PAPILLOMA VIRUS 18 DNA: NEGATIVE
HUMAN PAPILLOMA VIRUS FINAL DIAGNOSIS: NORMAL
HUMAN PAPILLOMA VIRUS OTHER HR: NEGATIVE

## 2021-12-07 NOTE — TELEPHONE ENCOUNTER
Received request to see if a PA was needed.  Received codes to use per , 24489-97 and [id number].  I checked [employer] website and also talked to a representative, no PA is needed   no

## 2022-02-09 ENCOUNTER — OFFICE VISIT (OUTPATIENT)
Dept: FAMILY MEDICINE | Facility: CLINIC | Age: 37
End: 2022-02-09
Payer: COMMERCIAL

## 2022-02-09 VITALS
TEMPERATURE: 97.8 F | SYSTOLIC BLOOD PRESSURE: 120 MMHG | DIASTOLIC BLOOD PRESSURE: 84 MMHG | HEART RATE: 95 BPM | OXYGEN SATURATION: 96 % | BODY MASS INDEX: 27.97 KG/M2 | WEIGHT: 174 LBS | RESPIRATION RATE: 18 BRPM | HEIGHT: 66 IN

## 2022-02-09 DIAGNOSIS — U07.1 CLINICAL DIAGNOSIS OF COVID-19: Primary | ICD-10-CM

## 2022-02-09 PROCEDURE — 99212 OFFICE O/P EST SF 10 MIN: CPT | Performed by: FAMILY MEDICINE

## 2022-02-09 ASSESSMENT — MIFFLIN-ST. JEOR: SCORE: 1491.01

## 2022-02-09 NOTE — LETTER
To whom it may concern.      Tessa Osipna      1985           Patient is seen in the clinic 2/9/2022. She had positive COVID test on 1/14/2022.   Resulted 1/18/2022. She is currently symptoms free.  No restrictions to travel.                    Sincerely,         Manoj Simpson MD    2/9/2022

## 2022-02-09 NOTE — PROGRESS NOTES
"  Assessment & Plan     Clinical diagnosis of COVID-19  Patient had clinical diagnosis of COVID-19 she is asymptomatic currently.  There is no restriction to travel at this time as long as there is no symptoms.      56}     BMI:   Estimated body mass index is 28.08 kg/m  as calculated from the following:    Height as of this encounter: 1.676 m (5' 6\").    Weight as of this encounter: 78.9 kg (174 lb).           No follow-ups on file.    Manoj Simpson MD  [facility] Center[facility]ART[facility] is a 36 year old who presents for the following health issues     HPI     Concern - Letter for recovering from Covid-19 infection     Description: tested positive on 01/14- for traveling     Patient is currently asymptomatic however she had positive test resulted on 1/18/2022 symptoms started week prior and she submits sample on 1/14/2022.  Denies any concerns no current symptoms.  She is planning to travel would like a letter stating she is symptom-free.  Review of Systems   Constitutional, HEENT, cardiovascular, pulmonary, gi and gu systems are negative, except as otherwise noted.      Objective    /84   Pulse 95   Temp 97.8  F (36.6  C) (Tympanic)   Resp 18   Ht 1.676 m (5' 6\")   Wt 78.9 kg (174 lb)   SpO2 96%   BMI 28.08 kg/m    Body mass index is 28.08 kg/m .  Physical Exam   GENERAL: healthy, alert and no distress  NECK: no adenopathy, no asymmetry, masses, or scars and thyroid normal to palpation  RESP: lungs clear to auscultation - no rales, rhonchi or wheezes  CV: regular rate and rhythm, normal S1 S2, no S3 or S4, no murmur, click or rub, no peripheral edema and peripheral pulses strong  MS: no gross musculoskeletal defects noted, no edema            "

## 2022-04-12 ENCOUNTER — ANCILLARY PROCEDURE (OUTPATIENT)
Dept: ULTRASOUND IMAGING | Facility: CLINIC | Age: 37
End: 2022-04-12
Attending: CLINICAL NURSE SPECIALIST
Payer: COMMERCIAL

## 2022-04-12 DIAGNOSIS — Z15.09 BRCA2 POSITIVE: ICD-10-CM

## 2022-04-12 DIAGNOSIS — Z91.89 AT RISK FOR CANCER: ICD-10-CM

## 2022-04-12 DIAGNOSIS — Z15.01 BRCA2 POSITIVE: ICD-10-CM

## 2022-04-12 PROCEDURE — 76830 TRANSVAGINAL US NON-OB: CPT

## 2022-04-12 PROCEDURE — 76856 US EXAM PELVIC COMPLETE: CPT

## 2022-04-12 NOTE — PROGRESS NOTES
Oncology Risk Management Consultation:  Date on this visit: 2022    Tessa Ospina returns to the [facility] today for a follow up oncology risk management consultation. She requires screening and surveillance to minimize her risk of cancer secondary to having a deleterious BRCA2 mutation.  She is considered to be at high risk for hereditary breast and ovarian cancer. Also present at this visit is Glenna Gilbert,  Doctorate of Nursing Practice student at the [employer], who is shadowing in clinic today.     Primary Physician: Shameka Milligan DO    History Of Present Illness:  Ms. Ospina is a very pleasant, healthy 36 year old female who presents with BRCA2+ associated Hereditary Breast and Ovarian Cancer Syndrome.     Genetic testin2015 - POSITIVE for a BRCA2 mutation specifically [id number], the same genetic mutation in her mother. The testing was done using single site analysis through [employer].     Pertinent  history:  Menarche at age 13.  First child at age 26.    History of breastfeeding both children. No issues with mastitis.   Ovaries, fallopian tubes and uterus intact.  No history of breast hyperplasia, atypia or malignancy.   2016 - Prophylactic nipple sparing mastectomy with reconstruction (Dr. Nely Mcqueen and Dr. Lam Arora), pathology benign.  2021- Prophylactic nipple removal. Pathology:  Right breast nipple and skin   B: Left breast nipple and skin FINAL DIAGNOSIS:   A: Skin and nipple, right breast, excision:   - Benign nipple and skin   - Negative for atypia or malignancy   B: Skin and nipple, left breast, excision:   - Benign nipple and skin, with squamous metaplasia in a lactiferous duct   - Negative for atypia or malignancy        Screening history:  11/3/15 - Pelvic ultrasound - probable uterine fibroid in anterior myometrium and L ovarian collapsing follicle.   16: pelvic ultrasound, L complex ovarian lesion.  3/4/16: Pelvic ultrasound, small  L ovarian cyst.   11/16/2016 - Pelvic US, normal. No lesions seen.  6/7/2017 - Pelvic US, normal, 2 cm dominant follicle in R ovary  1/25/2018 - 2 dominant cysts in R ovary, consider short term follow up  3/12/2018 - R cysts resolved, pelvic US normal.  10/31/2018 -Pelvic ultrasound, negative pelvic ultrasound.  4/26/2019 - Pelvic ultrasound, Probable corpus luteal cyst in the right ovary. No other  definite abnormality  1/6/2020- Transvaginal ultrasound, Slightly thickened endometrium for age and menstrual status. Similar-appearing small cystic lesion in the right ovary, this may simply represent a recurrent ruptured follicle.  7/29/2020- Transvaginal ultrasound, Abnormally thickened endometrium for age and menstrual status, but similar to previous.No fibroids Probable ruptured follicle in the left ovary.  1/13/2021- Transvaginal ultrasound: Endometrial stripe remains upper normal limits to slightly  thickened given LMP 12/29/2020 but is similar to prior studies and is  otherwise unremarkable. Ovaries within normal limits with an incidental corpus luteum in the right ovary noted.  9/20/2021- Transvaginal ultrasound. Uniform linear echogenic endometrium, normal ovaries and uterus.  4/12/2022- Transvaginal US, normal     8/27/2019- Ultrasound of the right axilla for palpable lump. A normal-sized lymph node is identified in the palpable area of concern. No lymphadenopathy, mass, or other suspicious sonographic finding is seen.   1/21/2021- Ultrasound of right axilla for history of bilateral mastectomies and chronic right axillary pain. No underlying sonographic abnormalities, BiRads1.      3/19/2015 -  - 9  9/17/2015 -  - 17  3/3/2016 -  39 (high)  6/21/2016  - - 23  6/1/2017 -  - 23  1/25/2018 -  - 11  4/26/2019 -  - 13  11/3/2020 -  - 13  11/8/2021-  - 11    She denies urinary urgency and frequency, abdominal pain, bloating, constipation, unusual vaginal discharge and  early satiety.    Past Medical/Surgical History:  Past Medical History:   Diagnosis Date     BRCA2 positive     has had bilateral mastectomies and reconstruction, still has ovaries     PONV (postoperative nausea and vomiting)     Unsure if this was due to anesthesia and/or vicodin.     Past Surgical History:   Procedure Laterality Date     BREAST BIOPSY, RT/LT Right 2015    benign      SECTION  , 2013     x 2     COSMETIC SURGERY  2016, 2016    Prophylactic bilateral mastectomy and reconstruction     GRAFT FAT TO BREAST Bilateral 2017    Procedure: GRAFT FAT TO BREAST;  Bilateral Breast mastopexy and Fat Grafting from Abdomen;  Surgeon: SUZY Arora MD;  Location: [facility]     MASTECTOMY, NIPPLE SPARING Bilateral 6/15/2016    Procedure: MASTECTOMY, NIPPLE SPARING;  Surgeon: Nely Mcqueen MD;  Location: [facility] OR     RECONSTRUCT BREAST BILATERAL Bilateral 6/15/2016    Procedure: RECONSTRUCT BREAST BILATERAL;  Surgeon: SUZY Arora MD;  Location: [facility] OR     RECONSTRUCT BREAST SECOND STAGE BILATERAL N/A 9/15/2016    Procedure: RECONSTRUCT BREAST SECOND STAGE BILATERAL;  Surgeon: SUZY Arora MD;  Location: [facility] OR     [facility]         Allergies:  Allergies as of 2022 - Reviewed 2022   Allergen Reaction Noted     Percocet [oxycodone-acetaminophen] Nausea and Vomiting 2015     Vicodin [hydrocodone-acetaminophen] Nausea and Vomiting 2010     Tramadol Nausea and Vomiting 2018       Current Medications:  Current Outpatient Medications   Medication Sig Dispense Refill     magnesium 250 MG tablet Take 1 tablet by mouth daily       Multiple Vitamin (MULTIVITAMINS PO)        norethindrone-ethinyl estradiol (MICROGESTIN ) 1-20 MG-MCG tablet Take 1 tablet by mouth daily 90 tablet 4     valACYclovir (VALTREX) 1000 mg tablet TAKE 2 TABLETS BY MOUTH 2 TIMES DAILY FOR 1 DAY           Family History:  Family History   Problem Relation Age of Onset     Breast Cancer Mother 49        BRCA2 genetic mutation     Pre-Diabetes Mother      Other - See Comments Mother 61        proctosigmoiditis, infection in one implant, kidney failure, subsequent intestinal issues     Vertigo Maternal Grandmother      Other Cancer Maternal Grandfather 60        Prostate and throat     Prostate Cancer Maternal Grandfather         likely BRCA2+     Crohn's Disease Maternal Grandfather      Meniere's disease Paternal Grandmother      Genetic Disorder Maternal Aunt         BRCA2 genetic mutation     Breast Cancer Maternal Aunt 70     Cancer Maternal Uncle 70        3 uncles (all 70s and 80s)     Cancer - colorectal Paternal Uncle 40         in 40s     Breast Cancer Cousin      Breast Cancer Other 30        maternal cousin; BRCA2 genetic mutation     Other Cancer Other 60        maternal cousin; prostate cancer     Breast Cancer Other 40        maternal cousin     Cancer - colorectal Other 70        Maternal great grandfather     Breast Cancer Other      Colon Cancer Other      Diabetes Other      Other Cancer Other        Social History:  Social History     Socioeconomic History     Marital status:      Spouse name: Danny     Number of children: 2     Years of education: Not on file     Highest education level: Not on file   Occupational History     Occupation: [employer]     Employer: SELF   Tobacco Use     Smoking status: Never Smoker     Smokeless tobacco: Never Used   Vaping Use     Vaping Use: Never used   Substance and Sexual Activity     Alcohol use: Yes     Alcohol/week: 0.0 standard drinks     Drug use: No     Sexual activity: Yes     Partners: Male     Birth control/protection: Male Surgical     Comment: vasectomy   Other Topics Concern     Parent/sibling w/ CABG, MI or angioplasty before 65F 55M? Not Asked      Service Not Asked     Blood Transfusions Not Asked     Caffeine Concern No      Occupational Exposure No     Hobby Hazards No     Sleep Concern No     Stress Concern Yes     Weight Concern Yes     Comment: in the process of getting back to prepregnancy weight     Special Diet No     Back Care Not Asked     Exercise No     Comment: keeping busy with 2 children under the age of 4; exercising using high intensity interval classes     Bike Helmet Not Asked     Seat Belt Not Asked     Self-Exams Yes   Social History Narrative     Not on file     Social Determinants of Health     Financial Resource Strain: Not on file   Food Insecurity: Not on file   Transportation Needs: Not on file   Physical Activity: Not on file   Stress: Not on file   Social Connections: Not on file   Intimate Partner Violence: Not on file   Housing Stability: Not on file       Physical Exam:  There were no vitals taken for this visit.  GENERAL: Healthy, alert and no distress  EYES: Eyes grossly normal to inspection.  No discharge or erythema, or obvious scleral/conjunctival abnormalities.  RESP: No audible wheeze, cough, or visible cyanosis.  No visible retractions or increased work of breathing.    SKIN: Visible skin clear. No significant rash, abnormal pigmentation or lesions.  NEURO: Cranial nerves grossly intact.  Mentation and speech appropriate for age.  PSYCH: Mentation appears normal, affect normal/bright, judgement and insight intact, normal speech and appearance well-groomed.    Laboratory/Imaging Studies  No results found for any visits on 04/13/22.    ASSESSMENT  We reviewed her interval history; she and her whole family had CoVid this past year and then recently went to [facility] for a vacation after their recovery. They are all vaccinated against CoVid.  We reviewed her family history and discussed that her mother is now having some GI issues with regard to proctosigmoiditis.  She will begin routine colonoscopies around age 39.    American Cancer Society recommends starting at age 40, but as she has a family  history of colon cancer, she may elect to start earlier.    We discussed that we will resume her  tests, as her risk for epithelial ovarian cancer is high. She will be seeing her gynecologist soon and is planning to begin a different form of birth control in order to reduce her risk for ovarian cancer. She is considering a vaginal ring.  Individualized Surveillance Plan for women  Hereditary Breast and/or Ovarian Cancer Syndrome   Per NCCN Guidelines Version 2.2022   Recommended screening Test or procedure Last done Next Scheduled    Breast self awareness starting at age 18.    Breast cancer risk >60% Women should be familiar with their breasts and promptly report changes to their care provider. Periodic, consistent self exam may facilitate breast self awareness. Premenopausal women may find self exams to be most informative when performed at the end of menses.   4/13/2022 April 2023   Breast screening, starting at age 25 Clinical breast exams every 6 -12 months NA NA   Breast screening   Age 25-29 Annual breast MRI screening with contrast (or mammogram if MRI is unavailable) or individualized based on family history if a breast cancer diagnosis before age 30 is present.       Breast MRI is performed preferably on day 7-15 of menstrual cycle for premenopausal women.     NA     NA   Breast screening   Age >30-75 years     Annual mammogram (consider tomosynthesis mammogram) and annual screening MRI.     Breast MRI is performed preferably on day 7-15 of menstrual cycle for premenopausal women.    Age>75 years, management should be considered on an individual basis. Prophylactic   mastectomy 2016   NA   Ovarian cancer screening, starting at age 30-35    Absolute risk for epithelial ovarian cancer -39-58% for BRCA1+ carriers and 13-29% for BRCA2+ carriers   Consider transvaginal ultrasound and  tests. 4/12/2022- Transvaginal ultrasound. normal October, then repeat in April 2023   Pancreatic Cancer Screening  beginning at age 50 or 10 years prior to the earliest pancreatic cancer on the BRCA-side of the family  In families with exocrine pancreatic cancer in a first or second degree relative    Risk is <5% for BRCA1+ carriers    5-10% for BRCA2+ carriers     Consider Annual MRI/MRCP and/or Endoscopic Ultrasound No family history of pancreatic cancer    Recommendation: risk-reducing salpingo-oophorectomy(RRSO), typically between 35 and 40 years old and  upon completion of child bearing.     Because ovarian cancer onset in patients with BRCA2 mutations is on average of 8-10 years later than in patients with BRCA1 mutations, it is reasonable to delay RRSO until 40-45 years in patients with BRCA2 mutations  unless age at diagnosis in the family warrants earlier age for consideration for prophylactic surgery.    Limited data suggest that there may be a slightly increased risk of serous uterine cancer among women with BRCA1+ pathogenic variants. The provider and the patient should discuss the risks and benefits of a concurrent hysterectomy at the time of RRSO for women with a BRCA1+ pathogenic variant /like pathogenic variant prior to surgery.     Salpingectomy alone is not the standard of care for risk reduction although clinical trials are ongoing.     Discuss option of risk-reducing mastectomy.    Consider risks and benefits of risk reducing agents, such as tamoxifen and raloxifene for breast and ovarian cancer.    Consider a full body skin and eye exam for melanoma screening for both BRCA1+ and BRCA2+.     NOTE: Women with BRCA mutation who are treated for breast cancer should have screening of the remaining breast tissue with annual mammography and breast MRI.       I spent a total of 47 minutes on the day of the visit. Please see the note for further information on patient assessment and treatment.    Cate Castro, APRN-CNS, OCN, AGN-BC  Clinical Nurse Specialist  Cancer Risk Management Program  [facility]  [place]  phone:  [phone]  fax: [phone]    CC: Shameka Milligan DO

## 2022-04-13 ENCOUNTER — VIRTUAL VISIT (OUTPATIENT)
Dept: ONCOLOGY | Facility: CLINIC | Age: 37
End: 2022-04-13
Payer: COMMERCIAL

## 2022-04-13 DIAGNOSIS — Z91.89 AT RISK FOR CANCER: ICD-10-CM

## 2022-04-13 DIAGNOSIS — Z15.01 BRCA2 POSITIVE: Primary | ICD-10-CM

## 2022-04-13 DIAGNOSIS — Z15.01 HEREDITARY BREAST AND OVARIAN CANCER SYNDROME ASSOCIATED WITH MUTATION IN BRCA2 GENE: ICD-10-CM

## 2022-04-13 DIAGNOSIS — Z15.09 BRCA2 POSITIVE: Primary | ICD-10-CM

## 2022-04-13 DIAGNOSIS — Z15.02 HEREDITARY BREAST AND OVARIAN CANCER SYNDROME ASSOCIATED WITH MUTATION IN BRCA2 GENE: ICD-10-CM

## 2022-04-13 PROCEDURE — 99215 OFFICE O/P EST HI 40 MIN: CPT | Mod: GT | Performed by: CLINICAL NURSE SPECIALIST

## 2022-04-13 NOTE — PROGRESS NOTES
Tessa is a 36 year old who is being evaluated via a billable video visit.      How would you like to obtain your AVS? MyChart  If the video visit is dropped, the invitation should be resent by: Send to e-mail at: [email or web address]  Will anyone else be joining your video visit? Eden Weinstein    Video Start Time: 1107  Video-Visit Details    Type of service:  Video Visit    Video End Time:1123    Originating Location (pt. Location): Home    Distant Location (provider location):  [facility]     Platform used for Video Visit: [employer]

## 2022-04-13 NOTE — LETTER
2022     RE: Tessa Ospina  [address]  [facility] [address]    Dear Colleague,    Thank you for referring your patient, Tessa Ospina, to the [facility]. Please see a copy of my visit note below.    Oncology Risk Management Consultation:  Date on this visit: 2022    Tessa Ospina returns to the [facility] today for a follow up oncology risk management consultation. She requires screening and surveillance to minimize her risk of cancer secondary to having a deleterious BRCA2 mutation.  She is considered to be at high risk for hereditary breast and ovarian cancer. Also present at this visit is Glenna Gilbert,  Doctorate of Nursing Practice student at the [employer], who is shadowing in clinic today.     Primary Physician: Shameka Milligan DO    History Of Present Illness:  Ms. Ospina is a very pleasant, healthy 36 year old female who presents with BRCA2+ associated Hereditary Breast and Ovarian Cancer Syndrome.     Genetic testin2015 - POSITIVE for a BRCA2 mutation specifically [id number], the same genetic mutation in her mother. The testing was done using single site analysis through [employer].     Pertinent  history:  Menarche at age 13.  First child at age 26.    History of breastfeeding both children. No issues with mastitis.   Ovaries, fallopian tubes and uterus intact.  No history of breast hyperplasia, atypia or malignancy.   2016 - Prophylactic nipple sparing mastectomy with reconstruction (Dr. Nely Mcqueen and Dr. Lam Arora), pathology benign.  2021- Prophylactic nipple removal. Pathology:  Right breast nipple and skin   B: Left breast nipple and skin FINAL DIAGNOSIS:   A: Skin and nipple, right breast, excision:   - Benign nipple and skin   - Negative for atypia or malignancy   B: Skin and nipple, left breast, excision:   - Benign nipple and skin, with squamous metaplasia in a lactiferous duct   - Negative for atypia  or malignancy        Screening history:  11/3/15 - Pelvic ultrasound - probable uterine fibroid in anterior myometrium and L ovarian collapsing follicle.   1/26/16: pelvic ultrasound, L complex ovarian lesion.  3/4/16: Pelvic ultrasound, small L ovarian cyst.   11/16/2016 - Pelvic US, normal. No lesions seen.  6/7/2017 - Pelvic US, normal, 2 cm dominant follicle in R ovary  1/25/2018 - 2 dominant cysts in R ovary, consider short term follow up  3/12/2018 - R cysts resolved, pelvic US normal.  10/31/2018 -Pelvic ultrasound, negative pelvic ultrasound.  4/26/2019 - Pelvic ultrasound, Probable corpus luteal cyst in the right ovary. No other  definite abnormality  1/6/2020- Transvaginal ultrasound, Slightly thickened endometrium for age and menstrual status. Similar-appearing small cystic lesion in the right ovary, this may simply represent a recurrent ruptured follicle.  7/29/2020- Transvaginal ultrasound, Abnormally thickened endometrium for age and menstrual status, but similar to previous.No fibroids Probable ruptured follicle in the left ovary.  1/13/2021- Transvaginal ultrasound: Endometrial stripe remains upper normal limits to slightly  thickened given LMP 12/29/2020 but is similar to prior studies and is  otherwise unremarkable. Ovaries within normal limits with an incidental corpus luteum in the right ovary noted.  9/20/2021- Transvaginal ultrasound. Uniform linear echogenic endometrium, normal ovaries and uterus.  4/12/2022- Transvaginal US, normal     8/27/2019- Ultrasound of the right axilla for palpable lump. A normal-sized lymph node is identified in the palpable area of concern. No lymphadenopathy, mass, or other suspicious sonographic finding is seen.   1/21/2021- Ultrasound of right axilla for history of bilateral mastectomies and chronic right axillary pain. No underlying sonographic abnormalities, BiRads1.      3/19/2015 -  - 9  9/17/2015 -  - 18  3/3/2016 -  40 (high)  6/21/2016   - - 23  2017 -  - 23  2018 -  - 11  2019 -  - 14  11/3/2020 -  - 14  2021-  - 11    She denies urinary urgency and frequency, abdominal pain, bloating, constipation, unusual vaginal discharge and early satiety.    Past Medical/Surgical History:  Past Medical History:   Diagnosis Date     BRCA2 positive     has had bilateral mastectomies and reconstruction, still has ovaries     PONV (postoperative nausea and vomiting)     Unsure if this was due to anesthesia and/or vicodin.     Past Surgical History:   Procedure Laterality Date     BREAST BIOPSY, RT/LT Right 2015    benign      SECTION  , 2013     x 2     COSMETIC SURGERY  2016, 2016    Prophylactic bilateral mastectomy and reconstruction     GRAFT FAT TO BREAST Bilateral 2017    Procedure: GRAFT FAT TO BREAST;  Bilateral Breast mastopexy and Fat Grafting from Abdomen;  Surgeon: SUZY Arora MD;  [facility]     MASTECTOMY, NIPPLE SPARING Bilateral 6/15/2016    Procedure: MASTECTOMY, NIPPLE SPARING;  Surgeon: Nely Mcqueen MD;  [facility]     RECONSTRUCT BREAST BILATERAL Bilateral 6/15/2016    Procedure: RECONSTRUCT BREAST BILATERAL;  Surgeon: SUZY Arora MD;  [facility]     RECONSTRUCT BREAST SECOND STAGE BILATERAL N/A 9/15/2016    Procedure: RECONSTRUCT BREAST SECOND STAGE BILATERAL;  Surgeon: SUZY Arora MD;  Location: [facility] OR     TONSILLECTOMY & ADENOIDECTOMY       [facility]         Allergies:  Allergies as of 2022 - Reviewed 2022   Allergen Reaction Noted     Percocet [oxycodone-acetaminophen] Nausea and Vomiting 2015     Vicodin [hydrocodone-acetaminophen] Nausea and Vomiting 2010     Tramadol Nausea and Vomiting 2018       Current Medications:  Current Outpatient Medications   Medication Sig Dispense Refill     magnesium 250 MG tablet Take 1 tablet by mouth daily        Multiple Vitamin (MULTIVITAMINS PO)        norethindrone-ethinyl estradiol (MICROGESTIN ) 1-20 MG-MCG tablet Take 1 tablet by mouth daily 90 tablet 4     valACYclovir (VALTREX) 1000 mg tablet TAKE 2 TABLETS BY MOUTH 2 TIMES DAILY FOR 1 DAY          Family History:  Family History   Problem Relation Age of Onset     Breast Cancer Mother 49        BRCA2 genetic mutation     Pre-Diabetes Mother      Other - See Comments Mother 61        proctosigmoiditis, infection in one implant, kidney failure, subsequent intestinal issues     Vertigo Maternal Grandmother      Other Cancer Maternal Grandfather 60        Prostate and throat     Prostate Cancer Maternal Grandfather         likely BRCA2+     Crohn's Disease Maternal Grandfather      Meniere's disease Paternal Grandmother      Genetic Disorder Maternal Aunt         BRCA2 genetic mutation     Breast Cancer Maternal Aunt 70     Cancer Maternal Uncle 70        3 uncles (all 70s and 80s)     Cancer - colorectal Paternal Uncle 40         in 40s     Breast Cancer Cousin      Breast Cancer Other 30        maternal cousin; BRCA2 genetic mutation     Other Cancer Other 60        maternal cousin; prostate cancer     Breast Cancer Other 40        maternal cousin     Cancer - colorectal Other 70        Maternal great grandfather     Breast Cancer Other      Colon Cancer Other      Diabetes Other      Other Cancer Other        Social History:  Social History     Socioeconomic History     Marital status:      Spouse name: Danny     Number of children: 2     Years of education: Not on file     Highest education level: Not on file   Occupational History     Occupation: [employer]     Employer: SELF   Tobacco Use     Smoking status: Never Smoker     Smokeless tobacco: Never Used   Vaping Use     Vaping Use: Never used   Substance and Sexual Activity     Alcohol use: Yes     Alcohol/week: 0.0 standard drinks     Drug use: No     Sexual activity: Yes     Partners: Male     Birth  control/protection: Male Surgical     Comment: vasectomy   Other Topics Concern     Parent/sibling w/ CABG, MI or angioplasty before 65F 55M? Not Asked      Service Not Asked     Blood Transfusions Not Asked     Caffeine Concern No     Occupational Exposure No     Hobby Hazards No     Sleep Concern No     Stress Concern Yes     Weight Concern Yes     Comment: in the process of getting back to prepregnancy weight     Special Diet No     Back Care Not Asked     Exercise No     Comment: keeping busy with 2 children under the age of 4; exercising using high intensity interval classes     Bike Helmet Not Asked     Seat Belt Not Asked     Self-Exams Yes   Social History Narrative     Not on file     Social Determinants of Health     Financial Resource Strain: Not on file   Food Insecurity: Not on file   Transportation Needs: Not on file   Physical Activity: Not on file   Stress: Not on file   Social Connections: Not on file   Intimate Partner Violence: Not on file   Housing Stability: Not on file       Physical Exam:  There were no vitals taken for this visit.  GENERAL: Healthy, alert and no distress  EYES: Eyes grossly normal to inspection.  No discharge or erythema, or obvious scleral/conjunctival abnormalities.  RESP: No audible wheeze, cough, or visible cyanosis.  No visible retractions or increased work of breathing.    SKIN: Visible skin clear. No significant rash, abnormal pigmentation or lesions.  NEURO: Cranial nerves grossly intact.  Mentation and speech appropriate for age.  PSYCH: Mentation appears normal, affect normal/bright, judgement and insight intact, normal speech and appearance well-groomed.    Laboratory/Imaging Studies  No results found for any visits on 04/13/22.    ASSESSMENT  We reviewed her interval history; she and her whole family had CoVid this past year and then recently went to [facility] for a vacation after their recovery. They are all vaccinated against CoVid.  We reviewed her  family history and discussed that her mother is now having some GI issues with regard to proctosigmoiditis.  She will begin routine colonoscopies around age 40.    American Cancer Society recommends starting at age 40, but as she has a family history of colon cancer, she may elect to start earlier.    We discussed that we will resume her  tests, as her risk for epithelial ovarian cancer is high. She will be seeing her gynecologist soon and is planning to begin a different form of birth control in order to reduce her risk for ovarian cancer. She is considering a vaginal ring.  Individualized Surveillance Plan for women  Hereditary Breast and/or Ovarian Cancer Syndrome   Per NCCN Guidelines Version 2.2022   Recommended screening Test or procedure Last done Next Scheduled    Breast self awareness starting at age 18.    Breast cancer risk >60% Women should be familiar with their breasts and promptly report changes to their care provider. Periodic, consistent self exam may facilitate breast self awareness. Premenopausal women may find self exams to be most informative when performed at the end of menses.   4/13/2022 April 2023   Breast screening, starting at age 25 Clinical breast exams every 6 -12 months NA NA   Breast screening   Age 25-29 Annual breast MRI screening with contrast (or mammogram if MRI is unavailable) or individualized based on family history if a breast cancer diagnosis before age 30 is present.       Breast MRI is performed preferably on day 7-15 of menstrual cycle for premenopausal women.     NA     NA   Breast screening   Age >30-75 years     Annual mammogram (consider tomosynthesis mammogram) and annual screening MRI.     Breast MRI is performed preferably on day 7-15 of menstrual cycle for premenopausal women.    Age>75 years, management should be considered on an individual basis. Prophylactic   mastectomy 2016   NA   Ovarian cancer screening, starting at age 30-35    Absolute risk for  epithelial ovarian cancer -39-58% for BRCA1+ carriers and 13-29% for BRCA2+ carriers   Consider transvaginal ultrasound and  tests. 4/12/2022- Transvaginal ultrasound. normal October, then repeat in April 2023   Pancreatic Cancer Screening beginning at age 50 or 10 years prior to the earliest pancreatic cancer on the BRCA-side of the family  In families with exocrine pancreatic cancer in a first or second degree relative    Risk is <5% for BRCA1+ carriers    5-10% for BRCA2+ carriers     Consider Annual MRI/MRCP and/or Endoscopic Ultrasound No family history of pancreatic cancer    Recommendation: risk-reducing salpingo-oophorectomy(RRSO), typically between 35 and 40 years old and  upon completion of child bearing.     Because ovarian cancer onset in patients with BRCA2 mutations is on average of 8-10 years later than in patients with BRCA1 mutations, it is reasonable to delay RRSO until 40-45 years in patients with BRCA2 mutations  unless age at diagnosis in the family warrants earlier age for consideration for prophylactic surgery.    Limited data suggest that there may be a slightly increased risk of serous uterine cancer among women with BRCA1+ pathogenic variants. The provider and the patient should discuss the risks and benefits of a concurrent hysterectomy at the time of RRSO for women with a BRCA1+ pathogenic variant /like pathogenic variant prior to surgery.     Salpingectomy alone is not the standard of care for risk reduction although clinical trials are ongoing.     Discuss option of risk-reducing mastectomy.    Consider risks and benefits of risk reducing agents, such as tamoxifen and raloxifene for breast and ovarian cancer.    Consider a full body skin and eye exam for melanoma screening for both BRCA1+ and BRCA2+.     NOTE: Women with BRCA mutation who are treated for breast cancer should have screening of the remaining breast tissue with annual mammography and breast MRI.       I spent a total  of 47 minutes on the day of the visit. Please see the note for further information on patient assessment and treatment.    MESHA Conrad-CNS, OCN, AGN-BC  Clinical Nurse Specialist  Cancer Risk Management Program  [facility]  phone:  [phone]  fax: [phone]    CC: Shameka Milligan DO    Tessa is a 36 year old who is being evaluated via a billable video visit.      How would you like to obtain your AVS? MyChart  If the video visit is dropped, the invitation should be resent by: Send to e-mail at: [email or web address]  Will anyone else be joining your video visit? No     Maya Weinstein  Video Start Time: 1107  Video-Visit Details  Type of service:  Video Visit  Video End Time:1123  Originating Location (pt. Location): Home  Distant Location (provider location):  [facility]   Platform used for Video Visit: Ashley

## 2022-04-28 ENCOUNTER — LAB (OUTPATIENT)
Dept: LAB | Facility: CLINIC | Age: 37
End: 2022-04-28
Payer: COMMERCIAL

## 2022-04-28 ENCOUNTER — OFFICE VISIT (OUTPATIENT)
Dept: OBGYN | Facility: CLINIC | Age: 37
End: 2022-04-28
Payer: COMMERCIAL

## 2022-04-28 VITALS
HEART RATE: 79 BPM | OXYGEN SATURATION: 98 % | SYSTOLIC BLOOD PRESSURE: 109 MMHG | WEIGHT: 174.6 LBS | DIASTOLIC BLOOD PRESSURE: 69 MMHG | BODY MASS INDEX: 28.18 KG/M2

## 2022-04-28 DIAGNOSIS — Z15.01 BRCA2 POSITIVE: ICD-10-CM

## 2022-04-28 DIAGNOSIS — N93.9 ABNORMAL UTERINE BLEEDING (AUB): Primary | ICD-10-CM

## 2022-04-28 DIAGNOSIS — Z15.09 BRCA2 GENE MUTATION POSITIVE: ICD-10-CM

## 2022-04-28 DIAGNOSIS — B00.1 RECURRENT COLD SORES: ICD-10-CM

## 2022-04-28 DIAGNOSIS — Z15.09 BRCA2 POSITIVE: ICD-10-CM

## 2022-04-28 DIAGNOSIS — Z15.01 BRCA2 GENE MUTATION POSITIVE: ICD-10-CM

## 2022-04-28 LAB — CANCER AG125 SERPL-ACNC: 21 U/ML (ref 0–30)

## 2022-04-28 PROCEDURE — 99213 OFFICE O/P EST LOW 20 MIN: CPT | Performed by: OBSTETRICS & GYNECOLOGY

## 2022-04-28 PROCEDURE — 86304 IMMUNOASSAY TUMOR CA 125: CPT

## 2022-04-28 PROCEDURE — 36415 COLL VENOUS BLD VENIPUNCTURE: CPT

## 2022-04-28 RX ORDER — ACETAMINOPHEN AND CODEINE PHOSPHATE 120; 12 MG/5ML; MG/5ML
0.35 SOLUTION ORAL DAILY
Qty: 90 TABLET | Refills: 4 | Status: SHIPPED | OUTPATIENT
Start: 2022-04-28 | End: 2022-09-28

## 2022-04-28 RX ORDER — VALACYCLOVIR HYDROCHLORIDE 1 G/1
2000 TABLET, FILM COATED ORAL 2 TIMES DAILY
Qty: 4 TABLET | Refills: 4 | Status: SHIPPED | OUTPATIENT
Start: 2022-04-28 | End: 2022-04-29

## 2022-04-28 NOTE — PATIENT INSTRUCTIONS
If you have any questions regarding your visit, Please contact your care team.    [facility]: [phone]      [facility] CLINIC HOURS TELEPHONE NUMBER   Shamekacaro Milligan DO.    DARVIN Coh -  Shanique -       Jeanette RN  Elodia, RN  SHANA Castaneda     Monday, Thursday  [place]  7am-3pm    Tuesday, Wednesday  [place]  7am-3pm    E/O Friday & 3rd Wednesday  [facility]  [address] [place] [address]  [phone] ask for [facility]    Imaging Vrks[place]oni-[phone]       Urgent Care locations:  [facility] Saturday and Sunday   9 am - 5 pm    Monday-Friday   12 pm - 8 pm  Saturday and Sunday   9 am - 5 pm   [phone]     [facility]:  [phone]    If you need a medication refill, please contact your pharmacy. Please allow 3 business days for your refill to be completed.  As always, Thank you for trusting us with your healthcare needs!

## 2022-04-28 NOTE — PROGRESS NOTES
SUBJECTIVE:       HPI: Tessa Ospina is a 36 year old  who presents today for counseling of contraception/hormonal medications for abnormal bleeding.     Known BRCA2+, following with oncology  Tessa has been taking OCPs since 2021 with unwanted SE. Since starting, has had a recurrence of headaches, vertigo. She tried PT and other lifestyle changes, however did not see any improvement until stopped the pills. Also experienced low libido, possible depressed mood.  Periods while taking OCPs were very light and short, with improvement in cramping.  Prior to this, she had tried the NuvaRing, but does remember if she had unwanted SE.   Patient using vasectomy for permanent contraception.        Ob Hx:  s/p   OB History    Para Term  AB Living   2 2 2 0 0 2   SAB IAB Ectopic Multiple Live Births   0 0 0 0 2      # Outcome Date GA Lbr Juan J/2nd Weight Sex Delivery Anes PTL Lv   2 Term 2013    F CS-LTranv   [facility]      Birth Comments: gestational diabetes   1 Term 11    M CS-LTranv   [facility]    .      Gyn Hx: Patient's last menstrual period was 2022.    Patient is sexually active.    Last pap was 2021 nil neg hpv         reports that she has never smoked. She has never used smokeless tobacco.      Today's PHQ-2 Score:   PHQ-2 (  [employer]) 2022   Q1: Little interest or pleasure in doing things 0   Q2: Feeling down, depressed or hopeless 0   PHQ-2 Score 0   PHQ-2 Total Score (12-17 Years)- Positive if 3 or more points; Administer PHQ-A if positive -   Q1: Little interest or pleasure in doing things Not at all   Q2: Feeling down, depressed or hopeless Not at all   PHQ-2 Score 0     Today's PHQ-9 Score:   PHQ-9 SCORE 11/3/2020   PHQ-9 Total Score 1     Today's BRITT-7 Score:   BRITT-7 SCORE 11/3/2020   Total Score -   Total Score 1       Problem list and histories reviewed & adjusted, as indicated.  Additional history: as documented.    Patient Active Problem List   Diagnosis     BRCA2  gene mutation positive in female     Hereditary breast and ovarian cancer syndrome associated with mutation in BRCA2 gene     S/P bilateral mastectomy     Status post bilateral breast reconstruction     White classification A2 gestational diabetes mellitus (GDM)     ACL (anterior cruciate ligament) rupture     Past Surgical History:   Procedure Laterality Date     BREAST BIOPSY, RT/LT Right 2015    benign      SECTION  , 2013     x 2     COSMETIC SURGERY  2016, 2016    Prophylactic bilateral mastectomy and reconstruction     GRAFT FAT TO BREAST Bilateral 2017    Procedure: GRAFT FAT TO BREAST;  Bilateral Breast mastopexy and Fat Grafting from Abdomen;  Surgeon: SUZY Arora MD;  [facility]     MASTECTOMY, NIPPLE SPARING Bilateral 6/15/2016    Procedure: MASTECTOMY, NIPPLE SPARING;  Surgeon: Nely Mcqueen MD;  [facility]: [facility] OR     RECONSTRUCT BREAST BILATERAL Bilateral 6/15/2016    Procedure: RECONSTRUCT BREAST BILATERAL;  Surgeon: SUZY Arora MD;  [facility]     RECONSTRUCT BREAST SECOND STAGE BILATERAL N/A 9/15/2016    Procedure: RECONSTRUCT BREAST SECOND STAGE BILATERAL;  Surgeon: SUZY Arora MD;  [facility] OR     TONSILLECTOMY & ADENOIDECTOMY       [facility]        Social History     Tobacco Use     Smoking status: Never Smoker     Smokeless tobacco: Never Used   Substance Use Topics     Alcohol use: Yes     Alcohol/week: 0.0 standard drinks      Problem (# of Occurrences) Relation (Name,Age of Onset)    Breast Cancer (6) Mother (Maryann Waters, 49): BRCA2 genetic mutation, Maternal Aunt (70), Cousin (Cousin), Other (30): maternal cousin; BRCA2 genetic mutation, Other (40): maternal cousin, Other (Great aunt)    Cancer (1) Maternal Uncle (70): 3 uncles (all 70s and 80s)    Cancer - colorectal (2) Paternal Uncle (40):  in 40s, Other (70): Maternal great grandfather    Colon Cancer (1) Other (Uncle)     Crohn's Disease (1) Maternal Grandfather (Max Villar)    Diabetes (1) Other (Uncle)    Genetic Disorder (1) Maternal Aunt: BRCA2 genetic mutation    Meniere's disease (1) Paternal Grandmother    Other - See Comments (1) Mother (Maryann Waters, 61): proctosigmoiditis, infection in one implant, kidney failure, subsequent intestinal issues    Other Cancer (3) Maternal Grandfather (Max Villar, 60): Prostate and throat, Other (60): maternal cousin; prostate cancer, Other (4 Great Uncles)    Pre-Diabetes (1) Mother (Maryann Waters)    Prostate Cancer (1) Maternal Grandfather (Max Villar): likely BRCA2+    Vertigo (1) Maternal Grandmother            magnesium 250 MG tablet, Take 1 tablet by mouth daily  Multiple Vitamin (MULTIVITAMINS PO),   valACYclovir (VALTREX) 1000 mg tablet, TAKE 2 TABLETS BY MOUTH 2 TIMES DAILY FOR 1 DAY    No current facility-administered medications on file prior to visit.    Allergies   Allergen Reactions     Percocet [Oxycodone-Acetaminophen] Nausea and Vomiting     Vicodin [Hydrocodone-Acetaminophen] Nausea and Vomiting     Sensitivity to narcotics.     Tramadol Nausea and Vomiting       ROS:  10 Point review of systems negative other noted above in HPI    OBJECTIVE:     /69   Pulse 79   Wt 79.2 kg (174 lb 9.6 oz)   LMP 04/22/2022   SpO2 98%   Breastfeeding No   BMI 28.18 kg/m    Body mass index is 28.18 kg/m .      Gen: Alert, oriented, appropriately interactive, NAD  Chest: Symmetrical, unlabored breathing    In-Clinic Test Results:  No results found for this or any previous visit (from the past 24 hour(s)).   Results for orders placed or performed in visit on 04/12/22   US Pelvic Complete with Transvaginal    Narrative    Pelvic Ultrasound-transabdominal and transvaginal    Comparisons: 9/20/2021    History: BRCA mutation    Findings: The uterus measures 10.5 x 4.9 x 5.8cm. The endometrial  stripe measures 15 mm thick.    The right and left ovaries measure  3.4 x 3.2 x 2.8 cm and 3.1 x 2.1 x  3.3 cm, respectively. Both ovaries demonstrate normal blood flow.  There are small follicles in both ovaries and a corpus luteum on the  left. No suspicious finding is identified.      Impression    Impression: Normal pelvic ultrasound    ORVILLE LA MD         SYSTEM ID:  [id number]          ASSESSMENT/PLAN:                                                      Tessa Ospina is a 36 year old  who presents today for counseling of contraception/hormonal medications for abnormal bleeding.       ICD-10-CM    1. Abnormal uterine bleeding (AUB)  N93.9 norethindrone (MICRONOR) 0.35 MG tablet   2. BRCA2 gene mutation positive  Z15.01 norethindrone (MICRONOR) 0.35 MG tablet    Z15.09    3. Recurrent cold sores  B00.1 valACYclovir (VALTREX) 1000 mg tablet       Hormonal medications for suppression of menses, dysmenorrhea and anovulatory benefits in the realm of BRCA reviewed in detail. After discussing risks and benefits of options, she decided that she would like to switch to POP for now given unwanted SE from [facility]. This is very reasonable.    Reviewed potential side effects of OCP's including but not limited to thromboembolic events, hypertension, breakthrough bleeding, GI upset, and headaches.  Reviewed proper usage.     Refill valtrex sent for cold sores.    RTO 6-8 months for annual exam, sooner if needed    Shameka Milligan DO  [facility] [place]

## 2022-09-16 ENCOUNTER — E-VISIT (OUTPATIENT)
Dept: FAMILY MEDICINE | Facility: CLINIC | Age: 37
End: 2022-09-16
Payer: COMMERCIAL

## 2022-09-16 DIAGNOSIS — H81.10 BENIGN PAROXYSMAL POSITIONAL VERTIGO, UNSPECIFIED LATERALITY: Primary | ICD-10-CM

## 2022-09-16 PROCEDURE — 99421 OL DIG E/M SVC 5-10 MIN: CPT | Performed by: FAMILY MEDICINE

## 2022-09-28 ENCOUNTER — OFFICE VISIT (OUTPATIENT)
Dept: FAMILY MEDICINE | Facility: CLINIC | Age: 37
End: 2022-09-28
Payer: COMMERCIAL

## 2022-09-28 VITALS
RESPIRATION RATE: 16 BRPM | TEMPERATURE: 97.9 F | BODY MASS INDEX: 28.57 KG/M2 | HEART RATE: 86 BPM | WEIGHT: 177.8 LBS | DIASTOLIC BLOOD PRESSURE: 62 MMHG | SYSTOLIC BLOOD PRESSURE: 104 MMHG | HEIGHT: 66 IN | OXYGEN SATURATION: 98 %

## 2022-09-28 DIAGNOSIS — M25.531 RIGHT WRIST PAIN: ICD-10-CM

## 2022-09-28 DIAGNOSIS — Z86.32 HISTORY OF GESTATIONAL DIABETES: ICD-10-CM

## 2022-09-28 DIAGNOSIS — Z23 NEED FOR VACCINATION: ICD-10-CM

## 2022-09-28 DIAGNOSIS — Z01.818 PRE-OP EXAM: Primary | ICD-10-CM

## 2022-09-28 LAB
HBA1C MFR BLD: 4.9 % (ref 0–5.6)
HGB BLD-MCNC: 14.3 G/DL (ref 11.7–15.7)

## 2022-09-28 PROCEDURE — 36415 COLL VENOUS BLD VENIPUNCTURE: CPT | Performed by: PHYSICIAN ASSISTANT

## 2022-09-28 PROCEDURE — 0124A COVID-19,PF,PFIZER BOOSTER BIVALENT: CPT | Performed by: PHYSICIAN ASSISTANT

## 2022-09-28 PROCEDURE — 90686 IIV4 VACC NO PRSV 0.5 ML IM: CPT | Performed by: PHYSICIAN ASSISTANT

## 2022-09-28 PROCEDURE — 83036 HEMOGLOBIN GLYCOSYLATED A1C: CPT | Performed by: PHYSICIAN ASSISTANT

## 2022-09-28 PROCEDURE — 90471 IMMUNIZATION ADMIN: CPT | Performed by: PHYSICIAN ASSISTANT

## 2022-09-28 PROCEDURE — 99214 OFFICE O/P EST MOD 30 MIN: CPT | Mod: 25 | Performed by: PHYSICIAN ASSISTANT

## 2022-09-28 PROCEDURE — 91312 COVID-19,PF,PFIZER BOOSTER BIVALENT: CPT | Performed by: PHYSICIAN ASSISTANT

## 2022-09-28 PROCEDURE — 85018 HEMOGLOBIN: CPT | Performed by: PHYSICIAN ASSISTANT

## 2022-09-28 ASSESSMENT — PAIN SCALES - GENERAL: PAINLEVEL: NO PAIN (0)

## 2022-09-28 NOTE — PROGRESS NOTES
[address] [address]  Phone: [phone]  Primary Provider: Shameka Milligan  Pre-op Performing Provider: SARAH DOMINIQUE    PREOPERATIVE EVALUATION:  Today's date: 9/28/2022    Tessa Ospina is a 36 year old female who presents for a preoperative evaluation.    Surgical Information:  Surgery/Procedure: Wrist Surgery    [facility]    Surgeon:   Surgery Date: 10/06/2022  Time of Surgery:   Where patient plans to recover: At home with family  Fax number for surgical facility: [phone]    Type of Anesthesia Anticipated: to be determined    Assessment & Plan     The proposed surgical procedure is considered INTERMEDIATE risk.      ICD-10-CM    1. Pre-op exam  Z01.818 Hemoglobin     Hemoglobin   2. Right wrist pain  M25.531    3. History of gestational diabetes  Z86.32 Hemoglobin A1c     Hemoglobin A1c   4. Need for vaccination  Z23 INFLUENZA VACCINE IM > 6 MONTHS VALENT IIV4 (AFLURIA/FLUZONE)     COVID-19,PF,PFIZER BOOSTER BIVALENT 12+Yrs       Risks and Recommendations:  The patient has the following additional risks and recommendations for perioperative complications:   - No identified additional risk factors other than previously addressed    Medication Instructions:  Patient is on no chronic medications    RECOMMENDATION:  APPROVAL GIVEN to proceed with proposed procedure, without further diagnostic evaluation.    Subjective     HPI related to upcoming procedure: patient with chronic tear of Lt wrist cartilage, failed conservative measures, undergoing surgical repair on 10/6/2022. Presents today for pre-op exam.     Preop Questions 9/28/2022   1. Have you ever had a heart attack or stroke? No   2. Have you ever had surgery on your heart or blood vessels, such as a stent placement, a coronary artery bypass, or surgery on an artery in your head, neck, heart, or legs? No   3. Do you have chest pain  with activity? No   4. Do you have a history of  heart failure? No   5. Do you currently have a cold, bronchitis or symptoms of other infection? No   6. Do you have a cough, shortness of breath, or wheezing? No   7. Do you or anyone in your family have previous history of blood clots? No   8. Do you or does anyone in your family have a serious bleeding problem such as prolonged bleeding following surgeries or cuts? No   9. Have you ever had problems with anemia or been told to take iron pills? No   10. Have you had any abnormal blood loss such as black, tarry or bloody stools, or abnormal vaginal bleeding? No   11. Have you ever had a blood transfusion? No   12. Are you willing to have a blood transfusion if it is medically needed before, during, or after your surgery? Yes   13. Have you or any of your relatives ever had problems with anesthesia? YES - nausea, vomiting 2/2 anesthesia and pain medications   14. Do you have sleep apnea, excessive snoring or daytime drowsiness? No   15. Do you have any artifical heart valves or other implanted medical devices like a pacemaker, defibrillator, or continuous glucose monitor? No   16. Do you have artificial joints? No   17. Are you allergic to latex? No   18. Is there any chance that you may be pregnant? No       Health Care Directive:  Patient does not have a Health Care Directive or Living Will: Discussed advance care planning with patient; however, patient declined at this time.    Preoperative Review of :   reviewed - no record of controlled substances prescribed.    Status of Chronic Conditions:  See problem list for active medical problems.  Problems all longstanding and stable, except as noted/documented.  See ROS for pertinent symptoms related to these conditions.      Review of Systems  Constitutional, neuro, ENT, endocrine, pulmonary, cardiac, gastrointestinal, genitourinary, musculoskeletal, integument and psychiatric systems are negative, except as  otherwise noted.    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  S/p prophylactic doubled mastectomy       White classification A2 gestational diabetes mellitus (GDM) 2013     Priority: Medium      Past Medical History:   Diagnosis Date     BRCA2 positive     has had bilateral mastectomies and reconstruction, still has ovaries     PONV (postoperative nausea and vomiting)     Unsure if this was due to anesthesia and/or vicodin.     Past Surgical History:   Procedure Laterality Date     BREAST BIOPSY, RT/LT Right 2015    benign      SECTION  , 2013     x 2     COSMETIC SURGERY  2016, 2016    Prophylactic bilateral mastectomy and reconstruction     GRAFT FAT TO BREAST Bilateral 2017    Procedure: GRAFT FAT TO BREAST;  Bilateral Breast mastopexy and Fat Grafting from Abdomen;  Surgeon: SUZY Arora MD;  Location:  OR     MASTECTOMY, NIPPLE SPARING Bilateral 6/15/2016    Procedure: MASTECTOMY, NIPPLE SPARING;  Surgeon: Nely Mcqueen MD;  Location:  OR     RECONSTRUCT BREAST BILATERAL Bilateral 6/15/2016    Procedure: RECONSTRUCT BREAST BILATERAL;  Surgeon: SUZY Arora MD;  Location:  OR     RECONSTRUCT BREAST SECOND STAGE BILATERAL N/A 9/15/2016    Procedure: RECONSTRUCT BREAST SECOND STAGE BILATERAL;  Surgeon: SUZY Arora MD;  Location: [facility] OR     TONSILLECTOMY & ADENOIDECTOMY       [place] [facility] GUIDE[facility]IRE       Current Outpatient Medications  "  Medication Sig Dispense Refill     magnesium 250 MG tablet Take 1 tablet by mouth daily       Multiple Vitamin (MULTIVITAMINS PO)        valACYclovir (VALTREX) 1000 mg tablet Take 2 tablets (2,000 mg) by mouth 2 times daily for 1 day 4 tablet 4       Allergies   Allergen Reactions     Percocet [Oxycodone-Acetaminophen] Nausea and Vomiting     Vicodin [Hydrocodone-Acetaminophen] Nausea and Vomiting     Sensitivity to narcotics.     Tramadol Nausea and Vomiting        Social History     Tobacco Use     Smoking status: Never Smoker     Smokeless tobacco: Never Used   Substance Use Topics     Alcohol use: Yes     Alcohol/week: 0.0 standard drinks     Family History   Problem Relation Age of Onset     Breast Cancer Mother 49        BRCA2 genetic mutation     Pre-Diabetes Mother      Other - See Comments Mother 61        proctosigmoiditis, infection in one implant, kidney failure, subsequent intestinal issues     Vertigo Maternal Grandmother      Other Cancer Maternal Grandfather 60        Prostate and throat     Prostate Cancer Maternal Grandfather         likely BRCA2+     Crohn's Disease Maternal Grandfather      Meniere's disease Paternal Grandmother      Genetic Disorder Maternal Aunt         BRCA2 genetic mutation     Breast Cancer Maternal Aunt 70     Cancer Maternal Uncle 70        3 uncles (all 70s and 80s)     Cancer - colorectal Paternal Uncle 40         in 40s     Breast Cancer Cousin      Breast Cancer Other 30        maternal cousin; BRCA2 genetic mutation     Other Cancer Other 60        maternal cousin; prostate cancer     Breast Cancer Other 40        maternal cousin     Cancer - colorectal Other 70        Maternal great grandfather     Breast Cancer Other      Colon Cancer Other      Diabetes Other      Other Cancer Other      History   Drug Use No         Objective     /62   Pulse 86   Temp 97.9  F (36.6  C) (Tympanic)   Resp 16   Ht 1.676 m (5' 6\")   Wt 80.6 kg (177 lb 12.8 oz)   LMP " 09/20/2022   SpO2 98%   BMI 28.70 kg/m      Physical Exam    GENERAL APPEARANCE: healthy, alert and no distress     EYES: EOMI, PERRL     HENT: ear canals and TM's normal and nose and mouth without ulcers or lesions     NECK: no adenopathy, no asymmetry, masses, or scars and thyroid normal to palpation     RESP: lungs clear to auscultation - no rales, rhonchi or wheezes     CV: regular rates and rhythm, normal S1 S2, no S3 or S4 and no murmur, click or rub     MS: extremities normal- no gross deformities noted, no evidence of inflammation in joints, FROM in all extremities.     SKIN: no suspicious lesions or rashes     NEURO: Normal strength and tone, sensory exam grossly normal, mentation intact and speech normal     PSYCH: mentation appears normal. and affect normal/bright     LYMPHATICS: No cervical adenopathy    Recent Labs   Lab Test 11/08/21  0833   HGB 13.2        Diagnostics:  Recent Results (from the past 24 hour(s))   Hemoglobin A1c    Collection Time: 09/28/22 10:33 AM   Result Value Ref Range    Hemoglobin A1C 4.9 0.0 - 5.6 %   Hemoglobin    Collection Time: 09/28/22 10:33 AM   Result Value Ref Range    Hemoglobin 14.3 11.7 - 15.7 g/dL      No EKG required, no history of coronary heart disease, significant arrhythmia, peripheral arterial disease or other structural heart disease.    Revised Cardiac Risk Index (RCRI):  The patient has the following serious cardiovascular risks for perioperative complications:   - No serious cardiac risks = 0 points     RCRI Interpretation: 0 points: Class I (very low risk - 0.4% complication rate)           Signed Electronically by: Luciana Wilder PA-C  Copy of this evaluation report is provided to requesting physician.

## 2022-10-26 ENCOUNTER — VIRTUAL VISIT (OUTPATIENT)
Dept: ONCOLOGY | Facility: CLINIC | Age: 37
End: 2022-10-26
Payer: COMMERCIAL

## 2022-10-26 DIAGNOSIS — Z15.09 BRCA2 POSITIVE: Primary | ICD-10-CM

## 2022-10-26 DIAGNOSIS — Z15.01 HEREDITARY BREAST AND OVARIAN CANCER SYNDROME ASSOCIATED WITH MUTATION IN BRCA2 GENE: ICD-10-CM

## 2022-10-26 DIAGNOSIS — Z15.01 BRCA2 POSITIVE: Primary | ICD-10-CM

## 2022-10-26 DIAGNOSIS — Z91.89 AT RISK FOR CANCER: ICD-10-CM

## 2022-10-26 DIAGNOSIS — Z15.02 HEREDITARY BREAST AND OVARIAN CANCER SYNDROME ASSOCIATED WITH MUTATION IN BRCA2 GENE: ICD-10-CM

## 2022-10-26 PROBLEM — S63.591A COMPLEX TEAR OF TRIANGULAR FIBROCARTILAGE OF RIGHT WRIST: Status: ACTIVE | Noted: 2022-10-06

## 2022-10-26 PROCEDURE — 99214 OFFICE O/P EST MOD 30 MIN: CPT | Mod: GT | Performed by: CLINICAL NURSE SPECIALIST

## 2022-10-26 NOTE — PATIENT INSTRUCTIONS
Individualized Surveillance Plan for women  Hereditary Breast and/or Ovarian Cancer Syndrome   Per NCCN Guidelines Version 1.2023   Recommended screening Test or procedure Last done Next Scheduled    Breast self awareness starting at age 18.    Breast cancer risk >60% Women should be familiar with their breasts and promptly report changes to their care provider. Periodic, consistent self exam may facilitate breast self awareness. Premenopausal women may find self exams to be most informative when performed at the end of menses.     NA   October 2023   Breast screening, starting at age 25 Clinical breast exams every 6 -12 months NA   NA   Breast screening   Age 25-29 Annual breast MRI screening with contrast (or mammogram if MRI is unavailable) or individualized based on family history if a breast cancer diagnosis before age 30 is present.       Breast MRI is performed preferably on day 7-15 of menstrual cycle for premenopausal women.     NA     NA   Breast screening   Age >30-75 years     Annual mammogram (consider tomosynthesis mammogram) and annual screening MRI.     Breast MRI is performed preferably on day 7-15 of menstrual cycle for premenopausal women.    Age>75 years, management should be considered on an individual basis.     NA     NA   Ovarian cancer screening, starting at age 30-35    Absolute risk for epithelial ovarian cancer -39-58% for BRCA1+ carriers and 13-29% for BRCA2+ carriers   Consider transvaginal ultrasound and  tests. 4/12/2022- Transvaginal US, normal TVUS tomorrow    Repeat both in April and October   Pancreatic Cancer Screening beginning at age 50 or 10 years prior to the earliest pancreatic cancer on the BRCA-side of the family  In families with exocrine pancreatic cancer in a first or second degree relative    Risk is <5% for BRCA1+ carriers    5-10% for BRCA2+ carriers     Consider Annual MRI/MRCP and/or Endoscopic Ultrasound     No family history of pancreatic cancer   Review at  next visit   Recommendation: risk-reducing salpingo-oophorectomy(RRSO), typically between 35 and 40 years old and  upon completion of child bearing.     Because ovarian cancer onset in patients with BRCA2 mutations is on average of 8-10 years later than in patients with BRCA1 mutations, it is reasonable to delay RRSO until 40-45 years in patients with BRCA2 mutations  unless age at diagnosis in the family warrants earlier age for consideration for prophylactic surgery.    Limited data suggest that there may be a slightly increased risk of serous uterine cancer among women with BRCA1+ pathogenic variants. The provider and the patient should discuss the risks and benefits of a concurrent hysterectomy at the time of RRSO for women with a BRCA1+ pathogenic variant /like pathogenic variant prior to surgery.     Salpingectomy alone is not the standard of care for risk reduction although clinical trials are ongoing.     Discuss option of risk-reducing mastectomy.    Consider risks and benefits of risk reducing agents, such as tamoxifen and raloxifene for breast and ovarian cancer.    Consider a full body skin and eye exam for melanoma screening for both BRCA1+ and BRCA2+.     NOTE: Women with BRCA mutation who are treated for breast cancer should have screening of the remaining breast tissue with annual mammography and breast MRI.

## 2022-10-26 NOTE — PROGRESS NOTES
Tessa is a 37 year old who is being evaluated via a billable video visit.  Currently in [place].    How would you like to obtain your AVS? MyChart  If the video visit is dropped, the invitation should be resent by: Send to e-mail at: [email or web address]  Will anyone else be joining your video visit? No     NIKHIL Guadarrama      Video-Visit Details    Video Start Time: 1350    Type of service:  Video Visit    Video End Time:2:03 PM    Originating Location (pt. Location): Home    Distant Location (provider location):  On-site    Platform used for Video Visit: [facility]    Oncology Risk Management Consultation:  Date on this visit: 10/26/2022    Tessa Ospina is participating in a billable video visit today for a follow up oncology risk management consultation. She requires screening and surveillance to minimize her risk of cancer secondary to having a deleterious BRCA2 mutation.  She is considered to be at high risk for hereditary breast and ovarian cancer.      Primary Physician: Shameka Milligan DO     History Of Present Illness:  Ms. Ospina is a very pleasant, healthy 37 year old female who presents with BRCA2+ associated Hereditary Breast and Ovarian Cancer Syndrome.      Genetic testin2015 - POSITIVE for a BRCA2 mutation specifically [id number], the same genetic mutation in her mother. The testing was done using single site analysis through [facility].     Pertinent  history:  Menarche at age 13.  First child at age 26.    History of breastfeeding both children. No issues with mastitis.   Ovaries, fallopian tubes and uterus intact.  No history of breast hyperplasia, atypia or malignancy.   2016 - Prophylactic nipple sparing mastectomy with reconstruction (Dr. Nely Mcqueen and Dr. Lam Arora), pathology benign.  2021- Prophylactic nipple removal. Pathology:  Right breast nipple and skin   B: Left breast nipple and skin FINAL DIAGNOSIS:   A: Skin and nipple, right breast, excision:   - Benign  nipple and skin   - Negative for atypia or malignancy   B: Skin and nipple, left breast, excision:   - Benign nipple and skin, with squamous metaplasia in a lactiferous duct   - Negative for atypia or malignancy         Screening history:  11/3/15 - Pelvic ultrasound - probable uterine fibroid in anterior myometrium and L ovarian collapsing follicle.   1/26/16: pelvic ultrasound, L complex ovarian lesion.  3/4/16: Pelvic ultrasound, small L ovarian cyst.   11/16/2016 - Pelvic US, normal. No lesions seen.  6/7/2017 - Pelvic US, normal, 2 cm dominant follicle in R ovary  1/25/2018 - 2 dominant cysts in R ovary, consider short term follow up  3/12/2018 - R cysts resolved, pelvic US normal.  10/31/2018 -Pelvic ultrasound, negative pelvic ultrasound.  4/26/2019 - Pelvic ultrasound, Probable corpus luteal cyst in the right ovary. No other  definite abnormality  1/6/2020- Transvaginal ultrasound, Slightly thickened endometrium for age and menstrual status. Similar-appearing small cystic lesion in the right ovary, this may simply represent a recurrent ruptured follicle.  7/29/2020- Transvaginal ultrasound, Abnormally thickened endometrium for age and menstrual status, but similar to previous.No fibroids Probable ruptured follicle in the left ovary.  1/13/2021- Transvaginal ultrasound: Endometrial stripe remains upper normal limits to slightly  thickened given LMP 12/29/2020 but is similar to prior studies and is  otherwise unremarkable. Ovaries within normal limits with an incidental corpus luteum in the right ovary noted.  9/20/2021- Transvaginal ultrasound. Uniform linear echogenic endometrium, normal ovaries and uterus.  4/12/2022- Transvaginal US, normal     8/27/2019- Ultrasound of the right axilla for palpable lump. A normal-sized lymph node is identified in the palpable area of concern. No lymphadenopathy, mass, or other suspicious sonographic finding is seen.   1/21/2021- Ultrasound of right axilla for history of  bilateral mastectomies and chronic right axillary pain. No underlying sonographic abnormalities, BiRads1.      3/19/2015 -  - 9  2015 -  - 17  3/3/2016 -  39 (high)  2016  - - 23  2017 -  - 23  2018 -  - 11  2019 -  - 13  11/3/2020 -  - 14  2021-  - 12  2022- -65     She denies urinary urgency and frequency, bloating, constipation, unusual vaginal discharge and early satiety. She acknowledges twinges of pelvic pain, similar to when she has had ovarian cysts in the past.    Past Medical/Surgical History:  Past Medical History:   Diagnosis Date     BRCA2 positive     has had bilateral mastectomies and reconstruction, still has ovaries     PONV (postoperative nausea and vomiting)     Unsure if this was due to anesthesia and/or vicodin.     Past Surgical History:   Procedure Laterality Date     BREAST BIOPSY, RT/LT Right 2015    benign      SECTION  , 2013     x 2     COSMETIC SURGERY  2016, 2016    Prophylactic bilateral mastectomy and reconstruction     GRAFT FAT TO BREAST Bilateral 2017    Procedure: GRAFT FAT TO BREAST;  Bilateral Breast mastopexy and Fat Grafting from Abdomen;  Surgeon: SUZY Arora MD;  Location: [facility]     MASTECTOMY, NIPPLE SPARING Bilateral 6/15/2016    Procedure: MASTECTOMY, NIPPLE SPARING;  Surgeon: Nely Mcqueen MD;  Location: [facility] OR     RECONSTRUCT BREAST BILATERAL Bilateral 6/15/2016    Procedure: RECONSTRUCT BREAST BILATERAL;  Surgeon: SUZY Arora MD;  Location: [facility] OR     RECONSTRUCT BREAST SECOND STAGE BILATERAL N/A 9/15/2016    Procedure: RECONSTRUCT BREAST SECOND STAGE BILATERAL;  Surgeon: SUZY Arora MD;  Location: [facility] OR     TONSILLECTOMY & ADENOIDECTOMY       [facility]         Allergies:  Allergies as of 10/26/2022 - Reviewed 10/26/2022   Allergen Reaction Noted     Oxycodone-acetaminophen Nausea and  Vomiting and Nausea 2015     Vicodin [hydrocodone-acetaminophen] Nausea and Vomiting 2010     Gluten meal Other (See Comments) 2013     Tramadol Nausea and Vomiting and Nausea 2018       Current Medications:  Current Outpatient Medications   Medication Sig Dispense Refill     magnesium 250 MG tablet Take 1 tablet by mouth daily       Multiple Vitamin (MULTIVITAMINS PO)        valACYclovir (VALTREX) 1000 mg tablet Take 2 tablets (2,000 mg) by mouth 2 times daily for 1 day 4 tablet 4        Family History:  Family History   Problem Relation Age of Onset     Breast Cancer Mother 49        BRCA2 genetic mutation     Pre-Diabetes Mother      Other - See Comments Mother 61        proctosigmoiditis, infection in one implant, kidney failure, subsequent intestinal issues     Vertigo Maternal Grandmother      Other Cancer Maternal Grandfather 60        Prostate and throat     Prostate Cancer Maternal Grandfather         likely BRCA2+     Crohn's Disease Maternal Grandfather      Meniere's disease Paternal Grandmother      Genetic Disorder Maternal Aunt         BRCA2 genetic mutation     Breast Cancer Maternal Aunt 70     Cancer Maternal Uncle 70        3 uncles (all 70s and 80s)     Cancer - colorectal Paternal Uncle 40         in 40s     Breast Cancer Cousin      Breast Cancer Other 30        maternal cousin; BRCA2 genetic mutation     Other Cancer Other 60        maternal cousin; prostate cancer     Breast Cancer Other 40        maternal cousin     Cancer - colorectal Other 70        Maternal great grandfather     Breast Cancer Other      Colon Cancer Other      Diabetes Other      Other Cancer Other        Social History:  Social History     Socioeconomic History     Marital status:      Spouse name: Danny     Number of children: 2     Years of education: Not on file     Highest education level: Not on file   Occupational History     Occupation: [employer]     Employer: SELF   Tobacco Use      Smoking status: Never     Smokeless tobacco: Never   Vaping Use     Vaping Use: Never used   Substance and Sexual Activity     Alcohol use: Yes     Alcohol/week: 0.0 standard drinks     Drug use: No     Sexual activity: Yes     Partners: Male     Birth control/protection: Male Surgical     Comment: vasectomy   Other Topics Concern     Parent/sibling w/ CABG, MI or angioplasty before 65F 55M? Not Asked      Service Not Asked     Blood Transfusions Not Asked     Caffeine Concern No     Occupational Exposure No     Hobby Hazards No     Sleep Concern No     Stress Concern Yes     Weight Concern Yes     Comment: in the process of getting back to prepregnancy weight     Special Diet No     Back Care Not Asked     Exercise No     Comment: keeping busy with 2 children under the age of 4; exercising using high intensity interval classes     Bike Helmet Not Asked     Seat Belt Not Asked     Self-Exams Yes   Social History Narrative     Not on file     Social Determinants of Health     Financial Resource Strain: Not on file   Food Insecurity: Not on file   Transportation Needs: Not on file   Physical Activity: Not on file   Stress: Not on file   Social Connections: Not on file   Intimate Partner Violence: Not on file   Housing Stability: Not on file       Physical Exam:  LMP 09/20/2022   GENERAL: Healthy, alert and mild distress, appropriately tearful at times.  EYES: Eyes grossly normal to inspection.  No discharge or erythema, or obvious scleral/conjunctival abnormalities.  RESP: No audible wheeze, cough, or visible cyanosis.  No visible retractions or increased work of breathing.    SKIN: Visible skin clear. No significant rash, abnormal pigmentation or lesions.  NEURO: Cranial nerves grossly intact.  Mentation and speech appropriate for age.  PSYCH: Mentation appears normal, affect normal/bright, judgement and insight intact, normal speech and appearance well-groomed.    Laboratory/Imaging Studies  No results found  for any visits on 10/26/22.    ASSESSMENT  We discussed her interval history; she recently had wrist surgery to repair a torn tendon in her right wrist      We reviewed her family history; there are no changes in her family's medical history. No hx of pancreatic cancer. Her  also suffered a seizure and was hospitalized for three days which has been stressful for her and her family.  She notes that he is better and that she and her family are evaluating how to minimize and deal with stress in their lives in order to preserve their health.    She  Is up to date on most of her screenings and will make a lab appointment for tomorrow for a  draw and a follow up appointment with Dermatology.  She has not yet restarted birth control pills because she feels that one type gave her vestibular side effects and the other increased her depression. She would be using these to minimize her risk for ovarian cancer; if there were a type she could tolerate, they may afford her a statistically significant risk reduction of up to 50%.  A meta-analysis of 18 studies, which were either case-control or retrospective cohort studies, of oral contraceptive use in BRCA mutation carriers included 2855 breast cancer cases and 1503 ovarian cancer cases (Oral contraceptive use and breast or ovarian cancer risk in BRCA1/2 carriers: a meta-analysis.Mikaela S, Ernesto M, Santiago N, Darron I, Margot B, Jeannette P, Jacob L, Maisonneuve P, Won S Eur J Cancer. 2010;46(12):2275.)  Use of oral contraceptives in BRCA mutation carriers was associated with a significantly reduced risk of ovarian cancer (summary relative risk [SRR] 0.50, 95% CI 0.33-0.75). For each additional 10 years of oral contraceptive use, there was a significantly reduced ovarian cancer risk (SRR 0.64, 95% CI 0.53-0.78). Other methods that might work for her would be the Estring vaginal ring and the Nexplanon implant.     At this point, she expects she will have an  ovarian cyst on her transvaginal US tomorrow.  She will proceed with the following plan.  Individualized Surveillance Plan for women  Hereditary Breast and/or Ovarian Cancer Syndrome   Per NCCN Guidelines Version 1.2023   Recommended screening Test or procedure Last done Next Scheduled    Breast self awareness starting at age 18.    Breast cancer risk >60% Women should be familiar with their breasts and promptly report changes to their care provider. Periodic, consistent self exam may facilitate breast self awareness. Premenopausal women may find self exams to be most informative when performed at the end of menses.     NA   October 2023   Breast screening, starting at age 25 Clinical breast exams every 6 -12 months NA   NA   Breast screening   Age 25-29 Annual breast MRI screening with contrast (or mammogram if MRI is unavailable) or individualized based on family history if a breast cancer diagnosis before age 30 is present.       Breast MRI is performed preferably on day 7-15 of menstrual cycle for premenopausal women.     NA     NA   Breast screening   Age >30-75 years     Annual mammogram (consider tomosynthesis mammogram) and annual screening MRI.     Breast MRI is performed preferably on day 7-15 of menstrual cycle for premenopausal women.    Age>75 years, management should be considered on an individual basis.     NA     NA   Ovarian cancer screening, starting at age 30-35    Absolute risk for epithelial ovarian cancer -39-58% for BRCA1+ carriers and 13-29% for BRCA2+ carriers   Consider transvaginal ultrasound and  tests. 4/12/2022- Transvaginal US, normal TVUS tomorrow    Repeat both in April and October   Pancreatic Cancer Screening beginning at age 50 or 10 years prior to the earliest pancreatic cancer on the BRCA-side of the family  In families with exocrine pancreatic cancer in a first or second degree relative    Risk is <5% for BRCA1+ carriers    5-10% for BRCA2+ carriers     Consider Annual  MRI/MRCP and/or Endoscopic Ultrasound     No family history of pancreatic cancer   Review at next visit   Recommendation: risk-reducing salpingo-oophorectomy(RRSO), typically between 35 and 40 years old and  upon completion of child bearing.     Because ovarian cancer onset in patients with BRCA2 mutations is on average of 8-10 years later than in patients with BRCA1 mutations, it is reasonable to delay RRSO until 40-45 years in patients with BRCA2 mutations  unless age at diagnosis in the family warrants earlier age for consideration for prophylactic surgery.    Limited data suggest that there may be a slightly increased risk of serous uterine cancer among women with BRCA1+ pathogenic variants. The provider and the patient should discuss the risks and benefits of a concurrent hysterectomy at the time of RRSO for women with a BRCA1+ pathogenic variant /like pathogenic variant prior to surgery.     Salpingectomy alone is not the standard of care for risk reduction although clinical trials are ongoing.     Discuss option of risk-reducing mastectomy.    Consider risks and benefits of risk reducing agents, such as tamoxifen and raloxifene for breast and ovarian cancer.    Consider a full body skin and eye exam for melanoma screening for both BRCA1+ and BRCA2+.     NOTE: Women with BRCA mutation who are treated for breast cancer should have screening of the remaining breast tissue with annual mammography and breast MRI.       I spent a total of 39 minutes on the day of the visit. Please see the note for further information on patient assessment and treatment.    MESHA Conrad-CNS, OCN, AGN-BC  Clinical Nurse Specialist  Cancer Risk Management Program  [facility]    CC: Shameka Milligan DO

## 2022-10-26 NOTE — LETTER
10/26/2022         RE: Tessa Ospina  [address]  [facility] [address]        Dear Colleague,    Thank you for referring your patient, Tessa Ospina, to the [facility]. Please see a copy of my visit note below.      Oncology Risk Management Consultation:  Date on this visit: 10/26/2022    Tessa Ospina is participating in a billable video visit today for a follow up oncology risk management consultation. She requires screening and surveillance to minimize her risk of cancer secondary to having a deleterious BRCA2 mutation.  She is considered to be at high risk for hereditary breast and ovarian cancer.      Primary Physician: Shameka Milligan DO     History Of Present Illness:  Ms. Ospina is a very pleasant, healthy 37 year old female who presents with BRCA2+ associated Hereditary Breast and Ovarian Cancer Syndrome.      Genetic testin2015 - POSITIVE for a BRCA2 mutation specifically [id number], the same genetic mutation in her mother. The testing was done using single site analysis through [facility].     Pertinent  history:  Menarche at age 13.  First child at age 26.    History of breastfeeding both children. No issues with mastitis.   Ovaries, fallopian tubes and uterus intact.  No history of breast hyperplasia, atypia or malignancy.   2016 - Prophylactic nipple sparing mastectomy with reconstruction (Dr. Nely Mcqueen and Dr. Lam Arora), pathology benign.  2021- Prophylactic nipple removal. Pathology:  Right breast nipple and skin   B: Left breast nipple and skin FINAL DIAGNOSIS:   A: Skin and nipple, right breast, excision:   - Benign nipple and skin   - Negative for atypia or malignancy   B: Skin and nipple, left breast, excision:   - Benign nipple and skin, with squamous metaplasia in a lactiferous duct   - Negative for atypia or malignancy         Screening history:  11/3/15 - Pelvic ultrasound - probable uterine fibroid in anterior myometrium and  L ovarian collapsing follicle.   1/26/16: pelvic ultrasound, L complex ovarian lesion.  3/4/16: Pelvic ultrasound, small L ovarian cyst.   11/16/2016 - Pelvic US, normal. No lesions seen.  6/7/2017 - Pelvic US, normal, 2 cm dominant follicle in R ovary  1/25/2018 - 2 dominant cysts in R ovary, consider short term follow up  3/12/2018 - R cysts resolved, pelvic US normal.  10/31/2018 -Pelvic ultrasound, negative pelvic ultrasound.  4/26/2019 - Pelvic ultrasound, Probable corpus luteal cyst in the right ovary. No other  definite abnormality  1/6/2020- Transvaginal ultrasound, Slightly thickened endometrium for age and menstrual status. Similar-appearing small cystic lesion in the right ovary, this may simply represent a recurrent ruptured follicle.  7/29/2020- Transvaginal ultrasound, Abnormally thickened endometrium for age and menstrual status, but similar to previous.No fibroids Probable ruptured follicle in the left ovary.  1/13/2021- Transvaginal ultrasound: Endometrial stripe remains upper normal limits to slightly  thickened given LMP 12/29/2020 but is similar to prior studies and is  otherwise unremarkable. Ovaries within normal limits with an incidental corpus luteum in the right ovary noted.  9/20/2021- Transvaginal ultrasound. Uniform linear echogenic endometrium, normal ovaries and uterus.  4/12/2022- Transvaginal US, normal     8/27/2019- Ultrasound of the right axilla for palpable lump. A normal-sized lymph node is identified in the palpable area of concern. No lymphadenopathy, mass, or other suspicious sonographic finding is seen.   1/21/2021- Ultrasound of right axilla for history of bilateral mastectomies and chronic right axillary pain. No underlying sonographic abnormalities, BiRads1.      3/19/2015 -  - 8  9/17/2015 -  - 17  3/3/2016 -  39 (high)  6/21/2016  - - 23  6/1/2017 -  - 22  1/25/2018 -  - 10  4/26/2019 -  - 13  11/3/2020 -  - 13  11/8/2021-  -  11  2022- -65     She denies urinary urgency and frequency, bloating, constipation, unusual vaginal discharge and early satiety. She acknowledges twinges of pelvic pain, similar to when she has had ovarian cysts in the past.    Past Medical/Surgical History:  Past Medical History:   Diagnosis Date     BRCA2 positive     has had bilateral mastectomies and reconstruction, still has ovaries     PONV (postoperative nausea and vomiting)     Unsure if this was due to anesthesia and/or vicodin.     Past Surgical History:   Procedure Laterality Date     BREAST BIOPSY, RT/LT Right 2015    benign      SECTION  , 2013     x 2     COSMETIC SURGERY  2016, 2016    Prophylactic bilateral mastectomy and reconstruction     GRAFT FAT TO BREAST Bilateral 2017    Procedure: GRAFT FAT TO BREAST;  Bilateral Breast mastopexy and Fat Grafting from Abdomen;  Surgeon: SUZY Arora MD;  Location: [facility] OR     MASTECTOMY, NIPPLE SPARING Bilateral 6/15/2016    Procedure: MASTECTOMY, NIPPLE SPARING;  Surgeon: Nely Mcqueen MD;  Location: [facility] OR     RECONSTRUCT BREAST BILATERAL Bilateral 6/15/2016    Procedure: RECONSTRUCT BREAST BILATERAL;  Surgeon: SUZY Arora MD;  Location: [facility] OR     RECONSTRUCT BREAST SECOND STAGE BILATERAL N/A 9/15/2016    Procedure: RECONSTRUCT BREAST SECOND STAGE BILATERAL;  Surgeon: SUZY Arora MD;  Location: [facility] OR     TONSILLECTOMY & ADENOIDECTOMY       [facility] GUIDEWIRE         Allergies:  Allergies as of 10/26/2022 - Reviewed 10/26/2022   Allergen Reaction Noted     Oxycodone-acetaminophen Nausea and Vomiting and Nausea 2015     Vicodin [hydrocodone-acetaminophen] Nausea and Vomiting 2010     Gluten meal Other (See Comments) 2013     Tramadol Nausea and Vomiting and Nausea 2018       Current Medications:  Current Outpatient Medications   Medication Sig Dispense Refill     magnesium 250 MG tablet  Take 1 tablet by mouth daily       Multiple Vitamin (MULTIVITAMINS PO)        valACYclovir (VALTREX) 1000 mg tablet Take 2 tablets (2,000 mg) by mouth 2 times daily for 1 day 4 tablet 4        Family History:  Family History   Problem Relation Age of Onset     Breast Cancer Mother 49        BRCA2 genetic mutation     Pre-Diabetes Mother      Other - See Comments Mother 61        proctosigmoiditis, infection in one implant, kidney failure, subsequent intestinal issues     Vertigo Maternal Grandmother      Other Cancer Maternal Grandfather 60        Prostate and throat     Prostate Cancer Maternal Grandfather         likely BRCA2+     Crohn's Disease Maternal Grandfather      Meniere's disease Paternal Grandmother      Genetic Disorder Maternal Aunt         BRCA2 genetic mutation     Breast Cancer Maternal Aunt 70     Cancer Maternal Uncle 70        3 uncles (all 70s and 80s)     Cancer - colorectal Paternal Uncle 40         in 40s     Breast Cancer Cousin      Breast Cancer Other 30        maternal cousin; BRCA2 genetic mutation     Other Cancer Other 60        maternal cousin; prostate cancer     Breast Cancer Other 40        maternal cousin     Cancer - colorectal Other 70        Maternal great grandfather     Breast Cancer Other      Colon Cancer Other      Diabetes Other      Other Cancer Other        Social History:  Social History     Socioeconomic History     Marital status:      Spouse name: Danny     Number of children: 2     Years of education: Not on file     Highest education level: Not on file   Occupational History     Occupation: [employer]     Employer: SELF   Tobacco Use     Smoking status: Never     Smokeless tobacco: Never   Vaping Use     Vaping Use: Never used   Substance and Sexual Activity     Alcohol use: Yes     Alcohol/week: 0.0 standard drinks     Drug use: No     Sexual activity: Yes     Partners: Male     Birth control/protection: Male Surgical     Comment: vasectomy   Other Topics  Concern     Parent/sibling w/ CABG, MI or angioplasty before 65F 55M? Not Asked      Service Not Asked     Blood Transfusions Not Asked     Caffeine Concern No     Occupational Exposure No     Hobby Hazards No     Sleep Concern No     Stress Concern Yes     Weight Concern Yes     Comment: in the process of getting back to prepregnancy weight     Special Diet No     Back Care Not Asked     Exercise No     Comment: keeping busy with 2 children under the age of 4; exercising using high intensity interval classes     Bike Helmet Not Asked     Seat Belt Not Asked     Self-Exams Yes   Social History Narrative     Not on file     Social Determinants of Health     Financial Resource Strain: Not on file   Food Insecurity: Not on file   Transportation Needs: Not on file   Physical Activity: Not on file   Stress: Not on file   Social Connections: Not on file   Intimate Partner Violence: Not on file   Housing Stability: Not on file       Physical Exam:  LMP 09/20/2022   GENERAL: Healthy, alert and mild distress, appropriately tearful at times.  EYES: Eyes grossly normal to inspection.  No discharge or erythema, or obvious scleral/conjunctival abnormalities.  RESP: No audible wheeze, cough, or visible cyanosis.  No visible retractions or increased work of breathing.    SKIN: Visible skin clear. No significant rash, abnormal pigmentation or lesions.  NEURO: Cranial nerves grossly intact.  Mentation and speech appropriate for age.  PSYCH: Mentation appears normal, affect normal/bright, judgement and insight intact, normal speech and appearance well-groomed.    Laboratory/Imaging Studies  No results found for any visits on 10/26/22.    ASSESSMENT  We discussed her interval history; she recently had wrist surgery to repair a torn tendon in her right wrist      We reviewed her family history; there are no changes in her family's medical history. No hx of pancreatic cancer. Her  also suffered a seizure and was  hospitalized for three days which has been stressful for her and her family.  She notes that he is better and that she and her family are evaluating how to minimize and deal with stress in their lives in order to preserve their health.    She  Is up to date on most of her screenings and will make a lab appointment for tomorrow for a  draw and a follow up appointment with Dermatology.  She has not yet restarted birth control pills because she feels that one type gave her vestibular side effects and the other increased her depression. She would be using these to minimize her risk for ovarian cancer; if there were a type she could tolerate, they may afford her a statistically significant risk reduction of up to 50%.  A meta-analysis of 18 studies, which were either case-control or retrospective cohort studies, of oral contraceptive use in BRCA mutation carriers included 2855 breast cancer cases and 1503 ovarian cancer cases (Oral contraceptive use and breast or ovarian cancer risk in BRCA1/2 carriers: a meta-analysis.Mikaela S, Ernesto M, Santiago N, Darron I, Margot B, Jeannette P, Jacob L, Maisonneuve P, Won S Eur J Cancer. 2010;46(12):2275.)  Use of oral contraceptives in BRCA mutation carriers was associated with a significantly reduced risk of ovarian cancer (summary relative risk [SRR] 0.50, 95% CI 0.33-0.75). For each additional 10 years of oral contraceptive use, there was a significantly reduced ovarian cancer risk (SRR 0.64, 95% CI 0.53-0.78). Other methods that might work for her would be the Estring vaginal ring and the Nexplanon implant.     At this point, she expects she will have an ovarian cyst on her transvaginal US tomorrow.  She will proceed with the following plan.  Individualized Surveillance Plan for women  Hereditary Breast and/or Ovarian Cancer Syndrome   Per NCCN Guidelines Version 1.2023   Recommended screening Test or procedure Last done Next Scheduled    Breast self awareness starting  at age 18.    Breast cancer risk >60% Women should be familiar with their breasts and promptly report changes to their care provider. Periodic, consistent self exam may facilitate breast self awareness. Premenopausal women may find self exams to be most informative when performed at the end of menses.     NA   October 2023   Breast screening, starting at age 25 Clinical breast exams every 6 -12 months NA   NA   Breast screening   Age 25-29 Annual breast MRI screening with contrast (or mammogram if MRI is unavailable) or individualized based on family history if a breast cancer diagnosis before age 30 is present.       Breast MRI is performed preferably on day 7-15 of menstrual cycle for premenopausal women.     NA     NA   Breast screening   Age >30-75 years     Annual mammogram (consider tomosynthesis mammogram) and annual screening MRI.     Breast MRI is performed preferably on day 7-15 of menstrual cycle for premenopausal women.    Age>75 years, management should be considered on an individual basis.     NA     NA   Ovarian cancer screening, starting at age 30-35    Absolute risk for epithelial ovarian cancer -39-58% for BRCA1+ carriers and 13-29% for BRCA2+ carriers   Consider transvaginal ultrasound and  tests. 4/12/2022- Transvaginal US, normal TVUS tomorrow    Repeat both in April and October   Pancreatic Cancer Screening beginning at age 50 or 10 years prior to the earliest pancreatic cancer on the BRCA-side of the family  In families with exocrine pancreatic cancer in a first or second degree relative    Risk is <5% for BRCA1+ carriers    5-10% for BRCA2+ carriers     Consider Annual MRI/MRCP and/or Endoscopic Ultrasound     No family history of pancreatic cancer   Review at next visit   Recommendation: risk-reducing salpingo-oophorectomy(RRSO), typically between 35 and 40 years old and  upon completion of child bearing.     Because ovarian cancer onset in patients with BRCA2 mutations is on average of  8-10 years later than in patients with BRCA1 mutations, it is reasonable to delay RRSO until 40-45 years in patients with BRCA2 mutations  unless age at diagnosis in the family warrants earlier age for consideration for prophylactic surgery.    Limited data suggest that there may be a slightly increased risk of serous uterine cancer among women with BRCA1+ pathogenic variants. The provider and the patient should discuss the risks and benefits of a concurrent hysterectomy at the time of RRSO for women with a BRCA1+ pathogenic variant /like pathogenic variant prior to surgery.     Salpingectomy alone is not the standard of care for risk reduction although clinical trials are ongoing.     Discuss option of risk-reducing mastectomy.    Consider risks and benefits of risk reducing agents, such as tamoxifen and raloxifene for breast and ovarian cancer.    Consider a full body skin and eye exam for melanoma screening for both BRCA1+ and BRCA2+.     NOTE: Women with BRCA mutation who are treated for breast cancer should have screening of the remaining breast tissue with annual mammography and breast MRI.       I spent a total of 39 minutes on the day of the visit. Please see the note for further information on patient assessment and treatment.    MESHA Conrad-CNS, OCN, AGN-BC  Clinical Nurse Specialist  Cancer Risk Management Program  [facility]    CC: Shameka Milligan DO

## 2022-10-27 ENCOUNTER — ANCILLARY PROCEDURE (OUTPATIENT)
Dept: ULTRASOUND IMAGING | Facility: CLINIC | Age: 37
End: 2022-10-27
Attending: CLINICAL NURSE SPECIALIST
Payer: COMMERCIAL

## 2022-10-27 ENCOUNTER — LAB (OUTPATIENT)
Dept: LAB | Facility: CLINIC | Age: 37
End: 2022-10-27
Payer: COMMERCIAL

## 2022-10-27 DIAGNOSIS — Z15.01 BRCA2 POSITIVE: ICD-10-CM

## 2022-10-27 DIAGNOSIS — Z15.09 BRCA2 POSITIVE: ICD-10-CM

## 2022-10-27 DIAGNOSIS — Z15.02 HEREDITARY BREAST AND OVARIAN CANCER SYNDROME ASSOCIATED WITH MUTATION IN BRCA2 GENE: ICD-10-CM

## 2022-10-27 DIAGNOSIS — Z11.59 NEED FOR HEPATITIS C SCREENING TEST: ICD-10-CM

## 2022-10-27 DIAGNOSIS — Z91.89 AT RISK FOR CANCER: ICD-10-CM

## 2022-10-27 DIAGNOSIS — Z15.01 HEREDITARY BREAST AND OVARIAN CANCER SYNDROME ASSOCIATED WITH MUTATION IN BRCA2 GENE: ICD-10-CM

## 2022-10-27 DIAGNOSIS — Z11.4 SCREENING FOR HIV (HUMAN IMMUNODEFICIENCY VIRUS): ICD-10-CM

## 2022-10-27 LAB — CANCER AG125 SERPL-ACNC: 13 U/ML

## 2022-10-27 PROCEDURE — 36415 COLL VENOUS BLD VENIPUNCTURE: CPT

## 2022-10-27 PROCEDURE — 76830 TRANSVAGINAL US NON-OB: CPT

## 2022-10-27 PROCEDURE — 87389 HIV-1 AG W/HIV-1&-2 AB AG IA: CPT

## 2022-10-27 PROCEDURE — 86304 IMMUNOASSAY TUMOR CA 125: CPT

## 2022-10-27 PROCEDURE — 86803 HEPATITIS C AB TEST: CPT

## 2022-10-28 LAB
HCV AB SERPL QL IA: NONREACTIVE
HIV 1+2 AB+HIV1 P24 AG SERPL QL IA: NONREACTIVE

## 2022-12-15 ENCOUNTER — OFFICE VISIT (OUTPATIENT)
Dept: OBGYN | Facility: CLINIC | Age: 37
End: 2022-12-15
Payer: COMMERCIAL

## 2022-12-15 VITALS
WEIGHT: 177 LBS | DIASTOLIC BLOOD PRESSURE: 77 MMHG | SYSTOLIC BLOOD PRESSURE: 114 MMHG | HEIGHT: 67 IN | OXYGEN SATURATION: 100 % | HEART RATE: 74 BPM | BODY MASS INDEX: 27.78 KG/M2

## 2022-12-15 DIAGNOSIS — Z15.01 HEREDITARY BREAST AND OVARIAN CANCER SYNDROME ASSOCIATED WITH MUTATION IN BRCA2 GENE: ICD-10-CM

## 2022-12-15 DIAGNOSIS — Z98.890 STATUS POST BILATERAL BREAST RECONSTRUCTION: ICD-10-CM

## 2022-12-15 DIAGNOSIS — Z01.419 WELL WOMAN EXAM WITH ROUTINE GYNECOLOGICAL EXAM: Primary | ICD-10-CM

## 2022-12-15 DIAGNOSIS — Z15.02 HEREDITARY BREAST AND OVARIAN CANCER SYNDROME ASSOCIATED WITH MUTATION IN BRCA2 GENE: ICD-10-CM

## 2022-12-15 PROCEDURE — 99395 PREV VISIT EST AGE 18-39: CPT | Performed by: OBSTETRICS & GYNECOLOGY

## 2022-12-15 NOTE — PROGRESS NOTES
SUBJECTIVE:       HPI: Tessa Ospina is a 37 year old  who presents today for WWE.     BRCA2 positive  Following with oncology.    Pertinent  history:  Menarche at age 13.  First child at age 26.    History of breastfeeding both children. No issues with mastitis.   Ovaries, fallopian tubes and uterus intact.  No history of breast hyperplasia, atypia or malignancy.   2016 - Prophylactic nipple sparing mastectomy with reconstruction (Dr. Nely Mcqueen and Dr. Lam Arora), pathology benign.  2021- Prophylactic nipple removal. Pathology:  Right breast nipple and skin   B: Left breast nipple and skin FINAL DIAGNOSIS:   A: Skin and nipple, right breast, excision:   - Benign nipple and skin   - Negative for atypia or malignancy   B: Skin and nipple, left breast, excision:   - Benign nipple and skin, with squamous metaplasia in a lactiferous duct   - Negative for atypia or malignancy         Screening history:  11/3/15 - Pelvic ultrasound - probable uterine fibroid in anterior myometrium and L ovarian collapsing follicle.   16: pelvic ultrasound, L complex ovarian lesion.  3/4/16: Pelvic ultrasound, small L ovarian cyst.   2016 - Pelvic US, normal. No lesions seen.  2017 - Pelvic US, normal, 2 cm dominant follicle in R ovary  2018 - 2 dominant cysts in R ovary, consider short term follow up  3/12/2018 - R cysts resolved, pelvic US normal.  10/31/2018 -Pelvic ultrasound, negative pelvic ultrasound.  2019 - Pelvic ultrasound, Probable corpus luteal cyst in the right ovary. No other  definite abnormality  2020- Transvaginal ultrasound, Slightly thickened endometrium for age and menstrual status. Similar-appearing small cystic lesion in the right ovary, this may simply represent a recurrent ruptured follicle.  2020- Transvaginal ultrasound, Abnormally thickened endometrium for age and menstrual status, but similar to previous.No fibroids Probable ruptured follicle in the left  "ovary.  1/13/2021- Transvaginal ultrasound: Endometrial stripe remains upper normal limits to slightly  thickened given LMP 12/29/2020 but is similar to prior studies and is  otherwise unremarkable. Ovaries within normal limits with an incidental corpus luteum in the right ovary noted.  9/20/2021- Transvaginal ultrasound. Uniform linear echogenic endometrium, normal ovaries and uterus.  4/12/2022- Transvaginal US, normal     8/27/2019- Ultrasound of the right axilla for palpable lump. A normal-sized lymph node is identified in the palpable area of concern. No lymphadenopathy, mass, or other suspicious sonographic finding is seen.   1/21/2021- Ultrasound of right axilla for history of bilateral mastectomies and chronic right axillary pain. No underlying sonographic abnormalities, BiRads1.      3/19/2015 -  - 9  9/17/2015 -  - 18  3/3/2016 -  39 (high)  6/21/2016  - - 23  6/1/2017 -  - 23  1/25/2018 -  - 11  4/26/2019 -  - 14  11/3/2020 -  - 14  11/8/2021-  - 12  4/28/2022- -14    \"Recommendation: risk-reducing salpingo-oophorectomy(RRSO), typically between 35 and 40 years old and  upon completion of child bearing.      Because ovarian cancer onset in patients with BRCA2 mutations is on average of 8-10 years later than in patients with BRCA1 mutations, it is reasonable to delay RRSO until 40-45 years in patients with BRCA2 mutations  unless age at diagnosis in the family warrants earlier age for consideration for prophylactic surgery.     Limited data suggest that there may be a slightly increased risk of serous uterine cancer among women with BRCA1+ pathogenic variants. The provider and the patient should discuss the risks and benefits of a concurrent hysterectomy at the time of RRSO for women with a BRCA1+ pathogenic variant /like pathogenic variant prior to surgery.      Salpingectomy alone is not the standard of care for risk reduction although clinical trials are " "ongoing.      Discuss option of risk-reducing mastectomy.     Consider risks and benefits of risk reducing agents, such as tamoxifen and raloxifene for breast and ovarian cancer.     Consider a full body skin and eye exam for melanoma screening for both BRCA1+ and BRCA2+.      NOTE: Women with BRCA mutation who are treated for breast cancer should have screening of the remaining breast tissue with annual mammography and breast MRI.\"      Ob Hx:  s/p   OB History    Para Term  AB Living   2 2 2 0 0 2   SAB IAB Ectopic Multiple Live Births   0 0 0 0 2      # Outcome Date GA Lbr Juan J/2nd Weight Sex Delivery Anes PTL Lv   2 Term 2013    F CS-LTranv   [facility]      Birth Comments: gestational diabetes   1 Term 11    M CS-LTranv   [facility]    .      Gyn Hx: No LMP recorded.     Last pap was  2021 nil neg hpv  Lab Results   Component Value Date    PAP NIL 2019    PAP NIL 2016     Using vasectomy for contraception.    Menses every 28 days. Reg-heavy flow.         reports that she has never smoked. She has never used smokeless tobacco.      Today's PHQ-2 Score:   PHQ-2 (  [employer]) 2022   Q1: Little interest or pleasure in doing things 0   Q2: Feeling down, depressed or hopeless 0   PHQ-2 Score 0   PHQ-2 Total Score (12-17 Years)- Positive if 3 or more points; Administer PHQ-A if positive -   Q1: Little interest or pleasure in doing things Not at all   Q2: Feeling down, depressed or hopeless Not at all   PHQ-2 Score 0     Today's PHQ-9 Score:   PHQ-9 SCORE 11/3/2020   PHQ-9 Total Score 1     Today's BRITT-7 Score:   BRITT-7 SCORE 11/3/2020   Total Score -   Total Score 1       Problem list and histories reviewed & adjusted, as indicated.  Additional history: as documented.    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     S/P bilateral mastectomy     Status " post bilateral breast reconstruction     White classification A2 gestational diabetes mellitus (GDM)     ACL (anterior cruciate ligament) rupture     Complex tear of triangular fibrocartilage of right wrist     Past Surgical History:   Procedure Laterality Date     BREAST BIOPSY, RT/LT Right 2015    benign      SECTION  , 2013     x 2     COSMETIC SURGERY  2016, 2016    Prophylactic bilateral mastectomy and reconstruction     GRAFT FAT TO BREAST Bilateral 2017    Procedure: GRAFT FAT TO BREAST;  Bilateral Breast mastopexy and Fat Grafting from Abdomen;  Surgeon: SUZY Arora MD;  [facility]     MASTECTOMY, NIPPLE SPARING Bilateral 6/15/2016    Procedure: MASTECTOMY, NIPPLE SPARING;  Surgeon: Nely Mcqueen MD;  Location: [facility] OR     RECONSTRUCT BREAST BILATERAL Bilateral 6/15/2016    Procedure: RECONSTRUCT BREAST BILATERAL;  Surgeon: SUZY Arora MD;  Location: [facility] OR     RECONSTRUCT BREAST SECOND STAGE BILATERAL N/A 9/15/2016    Procedure: RECONSTRUCT BREAST SECOND STAGE BILATERAL;  Surgeon: SUZY Arora MD;  Location: [facility] OR     TONSILLECTOMY & ADENOIDECTOMY  [address] [place] [address]        Social History     Tobacco Use     Smoking status: Never     Smokeless tobacco: Never   Substance Use Topics     Alcohol use: Yes     Alcohol/week: 0.0 standard drinks      Problem (# of Occurrences) Relation (Name,Age of Onset)    Pre-Diabetes (1) Mother (Maryann Waters)    Cancer (1) Maternal Uncle (70): 3 uncles (all 70s and 80s)    Diabetes (1) Other (Uncle)    Genetic Disorder (1) Maternal Aunt: BRCA2 genetic mutation    Breast Cancer (6) Mother (Maryann Waters, 49): BRCA2 genetic mutation, Maternal Aunt (70), Cousin (Cousin), Other (30): maternal cousin; BRCA2 genetic mutation, Other (40): maternal cousin, Other (Great aunt)    Cancer - colorectal (2) Paternal Uncle (40):  in 40s, Other (70): Maternal great grandfather     Prostate Cancer (1) Maternal Grandfather (Max Villar): likely BRCA2+    Meniere's disease (1) Paternal Grandmother    Vertigo (1) Maternal Grandmother    Other Cancer (3) Maternal Grandfather (Max Villar, 60): Prostate and throat, Other (60): maternal cousin; prostate cancer, Other (4 Great Uncles)    Crohn's Disease (1) Maternal Grandfather (Max Villar)    Other - See Comments (1) Mother (Maryann Waters, 61): proctosigmoiditis, infection in one implant, kidney failure, subsequent intestinal issues    Colon Cancer (1) Other (Uncle)            magnesium 250 MG tablet, Take 1 tablet by mouth daily  Multiple Vitamin (MULTIVITAMINS PO),   valACYclovir (VALTREX) 1000 mg tablet, Take 2 tablets (2,000 mg) by mouth 2 times daily for 1 day    No current facility-administered medications on file prior to visit.    Allergies   Allergen Reactions     Oxycodone-Acetaminophen Nausea and Vomiting and Nausea     Vicodin [Hydrocodone-Acetaminophen] Nausea and Vomiting     Sensitivity to narcotics.     Gluten Meal Other (See Comments)     Other reaction(s): *Unknown     Tramadol Nausea and Vomiting and Nausea       ROS:  10 Point review of systems negative other noted above in HPI    OBJECTIVE:     There were no vitals taken for this visit.  There is no height or weight on file to calculate BMI.      Gen: Alert, oriented, appropriately interactive, NAD  Eyes: Eyes grossly normal to inspection, conjunctivae and sclerae normal  CV: No edema  Breasts: no axillary adenopathy, no dominant masses, no skin changes. S/p b/l mastectomy with reconstruction  Resp: no audible wheeze, cough, or visible cyanosis.  No visible retractions or increased work of breathing.  Able to speak fully in complete sentences.  Abdomen: soft, non tender, non distended, no masses, no hernias.   External genitalia: no lesions; normal appearing external genitalia, bartholins glands, urethra, skenes glands  Vagina: no masses or lesions or discharge,  normally rugated.  Cervix: no masses or lesions or discharge   Bimanual exam:   Nontender pelvic floor muscles  Urethra: nontender   Bladder: nontender and without massess, well supported   Uterus: midline, anteverted, small, mobile  no masses, non-tender  Adnexa: no masses or tenderness appreciated   No cervical motion tenderness  MSK: normal gait, symmetric movements UE & LE  Lower extremities: non-tender, no edema  Neuro: Cranial nerves grossly intact, mentation intact and speech normal  Psych: mentation appears normal, affect normal/bright, judgement and insight intact, normal speech and appearance well-groomed      In-Clinic Test Results:  No results found for this or any previous visit (from the past 24 hour(s)).    ASSESSMENT/PLAN:                                                      Tessa Ospina is a 37 year old  who presents today for WWE      ICD-10-CM    1. Well woman exam with routine gynecological exam  Z01.419       2. 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 Status post bilateral breast reconstruction  Z98.890           Pap due 2024 per guidelines  Contraception- vasectomy  Flu vaccine- UTD  COVID vaccine- UTD  Smoking cessation counseling- NA  BRCA-2 carrier s/p b/l mastectomy with reconstruction. Not ready for RRBSO. Unwanted SE with [facility]. Declines alternative options. Following with oncology, preventative measures above. All questions answered.     RTO 1 year      Shameka Milligan DO  [facility] [place]

## 2022-12-15 NOTE — PATIENT INSTRUCTIONS
If you have any questions regarding your visit, Please contact your care team.    [facility]: [phone]      [facility] CLINIC HOURS TELEPHONE NUMBER   Shameka Milligan DO.    DARVIN Cho-Surgery Scheduler  Shanique - Surgery Scheduler    SHANA Reid, SHANA Castaneda RN     Monday, Thursday  [place]  7am-3pm    Tuesday, Wednesday  [place]  7am-3pm    [place] Friday &   [place]    Typical Surgery Days: Thursday or Friday   [facility]  [address] [place] [address] [phone] Phone  [phone] Fax    [address] [address] [phone] Phone    Imaging [phone] Phone    [facility]:  [phone] Phone     **Surgeries** Our Surgery Schedulers will contact you to schedule. If you do not receive a call within 3 business days, please call [phone].    Urgent Care locations:  [facility] Saturday and    9 am - 5 pm    Monday-Friday   12 pm - 8 pm  Saturday and    9 am - 5 pm   [phone]       If you need a medication refill, please contact your pharmacy. Please allow 3 business days for your refill to be completed.  As always, Thank you for trusting us with your healthcare needs!

## 2023-02-24 NOTE — PROGRESS NOTES
[facility] Dermatology Note  Encounter Date: Feb 27, 2023  Office Visit     Dermatology Problem List:  0. NUB - R lower back - s/p 2/27/23  1. History of atypical nevi  - Compound melanocytic nevus with mild atypia, R medial mid back s/p shave biopsy 9/18/2020  - Compound dysplastic nevus with mild atypia, L upper abdomen s/p shave biopsy 9/18/2020    PMHx: BRCA2 positive s/p prophylactic bilateral mastectomy w/ reconstruction  ____________________________________________    Assessment & Plan:    # NUB, R lower back ddx DN vs MM vs other  - Shave biopsy today; see procedure note below.    # Benign lesions: Multiple benign nevi, solar lentigos, cherry angiomas. Explained to patient benign nature of lesion. No treatment is necessary at this time unless the lesion changes or becomes symptomatic.   - ABCDs of melanoma were discussed and self skin checks were advised.  - Sun precaution was advised including the use of sun screens of SPF 30 or higher, sun protective clothing, and avoidance of tanning beds.    # History of dysplastic nevi  # History of BRCA2 mutation  - ABCDEs of melanoma advised  - Continue photoprotection  - Continue yearly skin exams  - Advised to monitor for changing, non-healing, bleeding, painful, changing, or otherwise symptomatic lesions    Procedures Performed:   - Shave biopsy procedure note, location(s): see above. After discussion of benefits and risks including but not limited to bleeding, infection, scar, incomplete removal, recurrence, and non-diagnostic biopsy, written consent and photographs were obtained. The area was cleaned with isopropyl alcohol. 0.5mL of 1% lidocaine with epinephrine was injected to obtain adequate anesthesia of lesion(s). Shave biopsy at site(s) performed. Hemostasis was achieved with aluminium chloride. Petrolatum ointment and a sterile dressing were applied. The patient tolerated the procedure and no complications were noted. The patient was  provided with verbal and written post care instructions.     Follow-up: pending pathology, otherwise 1 year(s) in-person, or earlier for new or changing lesions    Staff and Scribe:     Scribe Disclosure:  I, Skyler Rodriguez, am serving as a scribe to document services personally performed by Krista Wahl PA-C based on data collection and the provider's statements to me.   Provider Disclosure:   The documentation recorded by the scribe accurately reflects the services I personally performed and the decisions made by me.    All risks, benefits and alternatives were discussed with patient.  Patient is in agreement and understands the assessment and plan.  All questions were answered.    Krista Wahl PA-C, [facility]  [facility] [place]: Phone: [phone], Fax: [phone]  [facility]: Phone: [phone],  Fax: [phone]  [facility]: Phone: [phone], Fax: [phone]    ____________________________________________    CC: Skin Check (FBSC)    HPI:  Ms. Tessa Ospina is a(n) 37 year old female who presents today as a return patient for a FBSE. She is new to me. Last seen in dermatology by Agustina Rsos PA-C on 9/18/2020, at which time patient underwent shave biopsies to NUBs on the R medial mid back revealing a compound melanocytic nevus with mild atypia and L upper abdomen revealing a compound dysplastic nevus with mild atypia.    Today, patient does not endorse any specific concerns.    Patient is otherwise feeling well, without additional skin concerns.    Labs Reviewed:  N/A    Physical Exam:  Vitals: There were no vitals taken for this visit.  SKIN: Total skin excluding the undergarment areas was performed. The exam included the head/face, neck, both arms, chest, back, abdomen, both legs, digits and/or nails.   - 4 mm brown macule with an ill-defined border on the R lower back.   - There are dome shaped  bright red papules on the trunk and extremities.   - Multiple regular brown pigmented macules and papules are identified on the trunk and extremities.   - Scattered brown macules on sun exposed areas.  - No other lesions of concern on areas examined.             Medications:  Current Outpatient Medications   Medication     magnesium 250 MG tablet     Multiple Vitamin (MULTIVITAMINS PO)     valACYclovir (VALTREX) 1000 mg tablet     No current facility-administered medications for this visit.      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        CC Krista Wahl PA-C  [address] [place] [address] on close of this encounter.

## 2023-02-27 ENCOUNTER — OFFICE VISIT (OUTPATIENT)
Dept: FAMILY MEDICINE | Facility: CLINIC | Age: 38
End: 2023-02-27
Payer: COMMERCIAL

## 2023-02-27 DIAGNOSIS — D49.2 NEOPLASM OF UNSPECIFIED BEHAVIOR OF BONE, SOFT TISSUE, AND SKIN: ICD-10-CM

## 2023-02-27 DIAGNOSIS — L81.4 SOLAR LENTIGO: ICD-10-CM

## 2023-02-27 DIAGNOSIS — Z15.02 BRCA2 GENE MUTATION POSITIVE IN FEMALE: Primary | ICD-10-CM

## 2023-02-27 DIAGNOSIS — D22.9 MULTIPLE BENIGN NEVI: ICD-10-CM

## 2023-02-27 DIAGNOSIS — D18.01 CHERRY ANGIOMA: ICD-10-CM

## 2023-02-27 DIAGNOSIS — Z87.898 HISTORY OF ATYPICAL NEVUS: ICD-10-CM

## 2023-02-27 DIAGNOSIS — Z15.01 BRCA2 GENE MUTATION POSITIVE IN FEMALE: Primary | ICD-10-CM

## 2023-02-27 DIAGNOSIS — Z15.09 BRCA2 GENE MUTATION POSITIVE IN FEMALE: Primary | ICD-10-CM

## 2023-02-27 DIAGNOSIS — Z86.018 HISTORY OF DYSPLASTIC NEVUS: ICD-10-CM

## 2023-02-27 PROCEDURE — 88342 IMHCHEM/IMCYTCHM 1ST ANTB: CPT | Performed by: PATHOLOGY

## 2023-02-27 PROCEDURE — 88305 TISSUE EXAM BY PATHOLOGIST: CPT | Performed by: PATHOLOGY

## 2023-02-27 PROCEDURE — 99203 OFFICE O/P NEW LOW 30 MIN: CPT | Mod: 25 | Performed by: PHYSICIAN ASSISTANT

## 2023-02-27 PROCEDURE — 88341 IMHCHEM/IMCYTCHM EA ADD ANTB: CPT | Performed by: PATHOLOGY

## 2023-02-27 PROCEDURE — 11102 TANGNTL BX SKIN SINGLE LES: CPT | Performed by: PHYSICIAN ASSISTANT

## 2023-02-27 ASSESSMENT — PAIN SCALES - GENERAL: PAINLEVEL: NO PAIN (0)

## 2023-02-27 NOTE — LETTER
2/27/2023         RE: Tessa Ospina  [address]  [place] [address]        Dear Colleague,    Thank you for referring your patient, Tessa Ospina, to the [facility]. Please see a copy of my visit note below.    [facility] Dermatology Note  Encounter Date: Feb 27, 2023  Office Visit     Dermatology Problem List:  0. NUB - R lower back - s/p 2/27/23  1. History of atypical nevi  - Compound melanocytic nevus with mild atypia, R medial mid back s/p shave biopsy 9/18/2020  - Compound dysplastic nevus with mild atypia, L upper abdomen s/p shave biopsy 9/18/2020    PMHx: BRCA2 positive s/p prophylactic bilateral mastectomy w/ reconstruction  ____________________________________________    Assessment & Plan:    # NUB, R lower back ddx DN vs MM vs other  - Shave biopsy today; see procedure note below.    # Benign lesions: Multiple benign nevi, solar lentigos, cherry angiomas. Explained to patient benign nature of lesion. No treatment is necessary at this time unless the lesion changes or becomes symptomatic.   - ABCDs of melanoma were discussed and self skin checks were advised.  - Sun precaution was advised including the use of sun screens of SPF 30 or higher, sun protective clothing, and avoidance of tanning beds.    # History of dysplastic nevi  # History of BRCA2 mutation  - ABCDEs of melanoma advised  - Continue photoprotection  - Continue yearly skin exams  - Advised to monitor for changing, non-healing, bleeding, painful, changing, or otherwise symptomatic lesions    Procedures Performed:   - Shave biopsy procedure note, location(s): see above. After discussion of benefits and risks including but not limited to bleeding, infection, scar, incomplete removal, recurrence, and non-diagnostic biopsy, written consent and photographs were obtained. The area was cleaned with isopropyl alcohol. 0.5mL of 1% lidocaine with epinephrine was injected to obtain adequate  anesthesia of lesion(s). Shave biopsy at site(s) performed. Hemostasis was achieved with aluminium chloride. Petrolatum ointment and a sterile dressing were applied. The patient tolerated the procedure and no complications were noted. The patient was provided with verbal and written post care instructions.     Follow-up: pending pathology, otherwise 1 year(s) in-person, or earlier for new or changing lesions    Staff and Scribe:     Scribe Disclosure:  I, Skyler Rodriguez, am serving as a scribe to document services personally performed by Krista Wahl PA-C based on data collection and the provider's statements to me.   Provider Disclosure:   The documentation recorded by the scribe accurately reflects the services I personally performed and the decisions made by me.    All risks, benefits and alternatives were discussed with patient.  Patient is in agreement and understands the assessment and plan.  All questions were answered.    Krista Wahl PA-C, [facility]  [facility] [place]: Phone: [phone], Fax: [phone]  [facility]: Phone: [phone],  Fax: [phone]  [facility]: Phone: [phone], Fax: [phone]    ____________________________________________    CC: Skin Check (FBSC)    HPI:  Ms. Tessa Ospina is a(n) 37 year old female who presents today as a return patient for a FBSE. She is new to me. Last seen in dermatology by Agustina Ross PA-C on 9/18/2020, at which time patient underwent shave biopsies to NUBs on the R medial mid back revealing a compound melanocytic nevus with mild atypia and L upper abdomen revealing a compound dysplastic nevus with mild atypia.    Today, patient does not endorse any specific concerns.    Patient is otherwise feeling well, without additional skin concerns.    Labs Reviewed:  N/A    Physical Exam:  Vitals: There were no vitals taken for this visit.  SKIN: Total  skin excluding the undergarment areas was performed. The exam included the head/face, neck, both arms, chest, back, abdomen, both legs, digits and/or nails.   - 4 mm brown macule with an ill-defined border on the R lower back.   - There are dome shaped bright red papules on the trunk and extremities.   - Multiple regular brown pigmented macules and papules are identified on the trunk and extremities.   - Scattered brown macules on sun exposed areas.  - No other lesions of concern on areas examined.             Medications:  Current Outpatient Medications   Medication     magnesium 250 MG tablet     Multiple Vitamin (MULTIVITAMINS PO)     valACYclovir (VALTREX) 1000 mg tablet     No current facility-administered medications for this visit.      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        CC Krista Wahl PA-C  [address] [place] [phone] on close of this encounter.      Again, thank you for allowing me to participate in the care of your patient.        Sincerely,        Krista Wahl PA-C

## 2023-02-27 NOTE — PATIENT INSTRUCTIONS
Wound Care After a Biopsy    What is a skin biopsy?  A skin biopsy allows the doctor to examine a very small piece of tissue under the microscope to determine the diagnosis and the best treatment for the skin condition. A local anesthetic (numbing medicine)  is injected with a very small needle into the skin area to be tested. A small piece of skin is taken from the area. Sometimes a suture (stitch) is used.     What are the risks of a skin biopsy?  I will experience scar, bleeding, swelling, pain, crusting and redness. I may experience incomplete removal or recurrence. Risks of this procedure are excessive bleeding, bruising, infection, nerve damage, numbness, thick (hypertrophic or keloidal) scar and non-diagnostic biopsy.    How should I care for my wound for the first 24 hours?  Keep the wound dry and covered for 24 hours  If it bleeds, hold direct pressure on the area for 15 minutes. If bleeding does not stop then go to the emergency room  Avoid strenuous exercise the first 1-2 days or as your doctor instructs you    How should I care for the wound after 24 hours?  After 24 hours, remove the bandage  You may bathe or shower as normal  If you had a scalp biopsy, you can shampoo as usual and can use shower water to clean the biopsy site daily  Clean the wound twice a day with gentle soap and water  Do not scrub, be gentle  Apply white petroleum/Vaseline after cleaning the wound with a cotton swab or a clean finger, and keep the site covered with a Bandaid /bandage. Bandages are not necessary with a scalp biopsy  If you are unable to cover the site with a Bandaid /bandage, re-apply ointment 2-3 times a day to keep the site moist. Moisture will help with healing  Avoid strenuous activity for first 1-2 days  Avoid lakes, rivers, pools, and oceans until the stitches are removed or the site is healed    How do I clean my wound?  Wash hands thoroughly with soap or use hand  before all wound care  Clean the  wound with gentle soap and water  Apply white petroleum/Vaseline  to wound after it is clean  Replace the Bandaid /bandage to keep the wound covered for the first few days or as instructed by your doctor  If you had a scalp biopsy, warm shower water to the area on a daily basis should suffice    What should I use to clean my wound?   Cotton-tipped applicators (Qtips )  White petroleum jelly (Vaseline ). Use a clean new container and use Q-tips to apply.  Bandaids   as needed  Gentle soap     How should I care for my wound long term?  Do not get your wound dirty  Keep up with wound care for one week or until the area is healed.  A small scab will form and fall off by itself when the area is completely healed. The area will be red and will become pink in color as it heals. Sun protection is very important for how your scar will turn out. Sunscreen with an SPF 30 or greater is recommended once the area is healed.  You should have some soreness but it should be mild and slowly go away over several days. Talk to your doctor about using tylenol for pain,    When should I call my doctor?  If you have increased:   Pain or swelling  Pus or drainage (clear or slightly yellow drainage is ok)  Temperature over 100F  Spreading redness or warmth around wound    When will I hear about my results?  The biopsy results can take 2 weeks to come back.  Your results will automatically release to [employer] before your provider has even reviewed them.  The clinic will call you with the results, send you a [employer] message, or have you schedule a follow-up clinic or phone time to discuss the results.  Contact our clinics if you do not hear from us in 2 weeks.    Who should I call with questions?  [facility]: [phone]  [facility]: [phone]  For urgent needs outside of business hours call the [facility] at [phone] and ask for the dermatology resident on call        Patient Education     Checking for Skin Cancer  You can find cancer early by checking your skin each month. There are 3 kinds of skin cancer. They are melanoma, basal cell carcinoma, and squamous cell carcinoma. Doing monthly skin checks is the best way to find new marks or skin changes. Follow the instructions below for checking your skin.   The ABCDEs of checking moles for melanoma   Check your moles or growths for signs of melanoma using ABCDE:   Asymmetry: the sides of the mole or growth don t match  Border: the edges are ragged, notched, or blurred  Color: the color within the mole or growth varies  Diameter: the mole or growth is larger than 6 mm (size of a pencil eraser)  Evolving: the size, shape, or color of the mole or growth is changing (evolving is not shown in the images below)    Checking for other types of skin cancer  Basal cell carcinoma or squamous cell carcinoma have symptoms such as:     A spot or mole that looks different from all other marks on your skin  Changes in how an area feels, such as itching, tenderness, or pain  Changes in the skin's surface, such as oozing, bleeding, or scaliness  A sore that does not heal  New swelling or redness beyond the border of a mole    Who s at risk?  Anyone can get skin cancer. But you are at greater risk if you have:   Fair skin, light-colored hair, or light-colored eyes  Many moles or abnormal moles on your skin  A history of sunburns from sunlight or tanning beds  A family history of skin cancer  A history of exposure to radiation or chemicals  A weakened immune system  If you have had skin cancer in the past, you are at risk for recurring skin cancer.   How to check your skin  Do your monthly skin checkups in front of a full-length mirror. Check all parts of your body, including your:   Head (ears, face, neck, and scalp)  Torso (front, back, and sides)  Arms (tops, undersides, upper, and lower armpits)  Hands (palms, backs, and fingers, including  under the nails)  Buttocks and genitals  Legs (front, back, and sides)  Feet (tops, soles, toes, including under the nails, and between toes)  If you have a lot of moles, take digital photos of them each month. Make sure to take photos both up close and from a distance. These can help you see if any moles change over time.   Most skin changes are not cancer. But if you see any changes in your skin, call your doctor right away. Only he or she can diagnose a problem. If you have skin cancer, seeing your doctor can be the first step toward getting the treatment that could save your life.   [employer] last reviewed this educational content on 4/1/2019 2000-2020 [employer]. [address] [place] [address], [place] [address]. All rights reserved. This information is not intended as a substitute for professional medical care. Always follow your healthcare professional's instructions.       When should I call my doctor?  If you are worsening or not improving, please, contact us or seek urgent care as noted below.     Who should I call with questions (adults)?  [facility] (adult and pediatric): [phone]  [facility] (adult): [phone]  For urgent needs outside of business hours call the [facility] at [phone] and ask for the dermatology resident on call to be paged  If this is a medical emergency and you are unable to reach an ER, Call [phone]    Who should I call with questions (pediatric)?  [facility]- Pediatric Dermatology  Dr. Sadia Gomez, Dr. Ree Loza, Dr. Aminata Tolliver, ROSINA Gray, Dr. Jyoti Rajput, Dr. Jailyn Guillen & Dr. Jorje Murphy  Non-urgent nurse triage line; [phone]- Jennie and Loli SALDANA Care Coordinatorgaby Campbell (/Complex ) [phone]    If you need a prescription refill, please contact your pharmacy. Refills are approved or denied by our  Physicians during normal business hours, Monday through Fridays  Per office policy, refills will not be granted if you have not been seen within the past year (or sooner depending on your child's condition)    Scheduling Information:  Pediatric Appointment Scheduling and Call Center [phone]  Radiology Scheduling- [phone]  Sedation Unit Scheduling- [phone]  [place] Scheduling- General [phone]; Pediatric Dermatology [phone]  Main  Services: [phone]  Danish: [phone]  Mozambican: [phone]  Hmong/Taiwanese/Maori: [phone]  Preadmission Nursing Department Fax Number: [phone] (Fax all pre-operative paperwork to this number)    For urgent matters arising during evenings, weekends, or holidays that cannot wait for normal business hours please call [phone] and ask for the dermatology resident on call to be paged.

## 2023-03-01 LAB
PATH REPORT.COMMENTS IMP SPEC: NORMAL
PATH REPORT.FINAL DX SPEC: NORMAL
PATH REPORT.GROSS SPEC: NORMAL
PATH REPORT.MICROSCOPIC SPEC OTHER STN: NORMAL
PATH REPORT.RELEVANT HX SPEC: NORMAL

## 2023-05-09 ENCOUNTER — LAB (OUTPATIENT)
Dept: LAB | Facility: CLINIC | Age: 38
End: 2023-05-09
Attending: CLINICAL NURSE SPECIALIST
Payer: COMMERCIAL

## 2023-05-09 ENCOUNTER — ANCILLARY PROCEDURE (OUTPATIENT)
Dept: ULTRASOUND IMAGING | Facility: CLINIC | Age: 38
End: 2023-05-09
Attending: CLINICAL NURSE SPECIALIST
Payer: COMMERCIAL

## 2023-05-09 DIAGNOSIS — Z83.79 FAMILY HISTORY OF CELIAC DISEASE: Primary | ICD-10-CM

## 2023-05-09 PROCEDURE — 76856 US EXAM PELVIC COMPLETE: CPT | Performed by: RADIOLOGY

## 2023-05-09 PROCEDURE — 76830 TRANSVAGINAL US NON-OB: CPT | Performed by: RADIOLOGY

## 2023-05-09 PROCEDURE — 36415 COLL VENOUS BLD VENIPUNCTURE: CPT

## 2023-05-09 PROCEDURE — 86304 IMMUNOASSAY TUMOR CA 125: CPT | Performed by: CLINICAL NURSE SPECIALIST

## 2023-05-09 PROCEDURE — 86231 EMA EACH IG CLASS: CPT | Mod: 90

## 2023-05-11 LAB — ENDOMYSIUM IGA TITR SER IF: NORMAL {TITER}

## 2023-10-18 ENCOUNTER — ANCILLARY PROCEDURE (OUTPATIENT)
Dept: ULTRASOUND IMAGING | Facility: CLINIC | Age: 38
End: 2023-10-18
Attending: CLINICAL NURSE SPECIALIST
Payer: COMMERCIAL

## 2023-10-18 PROCEDURE — 76830 TRANSVAGINAL US NON-OB: CPT | Performed by: RADIOLOGY

## 2023-10-18 PROCEDURE — 76856 US EXAM PELVIC COMPLETE: CPT | Performed by: RADIOLOGY

## 2023-10-25 ENCOUNTER — ONCOLOGY VISIT (OUTPATIENT)
Dept: ONCOLOGY | Facility: CLINIC | Age: 38
End: 2023-10-25
Attending: CLINICAL NURSE SPECIALIST
Payer: COMMERCIAL

## 2023-10-25 ENCOUNTER — PATIENT OUTREACH (OUTPATIENT)
Dept: ONCOLOGY | Facility: CLINIC | Age: 38
End: 2023-10-25

## 2023-10-25 VITALS
RESPIRATION RATE: 16 BRPM | HEIGHT: 67 IN | BODY MASS INDEX: 28.08 KG/M2 | OXYGEN SATURATION: 98 % | DIASTOLIC BLOOD PRESSURE: 71 MMHG | HEART RATE: 73 BPM | WEIGHT: 178.9 LBS | SYSTOLIC BLOOD PRESSURE: 106 MMHG

## 2023-10-25 DIAGNOSIS — Z12.73 ENCOUNTER FOR SCREENING FOR MALIGNANT NEOPLASM OF OVARY: Primary | ICD-10-CM

## 2023-10-25 DIAGNOSIS — Z15.09 BRCA2 POSITIVE: ICD-10-CM

## 2023-10-25 DIAGNOSIS — Z15.01 BRCA2 POSITIVE: ICD-10-CM

## 2023-10-25 PROCEDURE — 99213 OFFICE O/P EST LOW 20 MIN: CPT | Performed by: CLINICAL NURSE SPECIALIST

## 2023-10-25 ASSESSMENT — PAIN SCALES - GENERAL: PAINLEVEL: NO PAIN (0)

## 2023-10-25 NOTE — NURSING NOTE
"Oncology Rooming Note    October 25, 2023 10:06 AM   Tessa Ospina is a 38 year old female who presents for:    Chief Complaint   Patient presents with    Family History Of Cancer     Follow up     Initial Vitals: /71 (BP Location: Right arm)   Pulse 73   Resp 16   Ht 1.7 m (5' 6.93\")   Wt 81.1 kg (178 lb 14.4 oz)   LMP 08/30/2023 (Exact Date)   SpO2 98%   BMI 28.08 kg/m   Estimated body mass index is 28.08 kg/m  as calculated from the following:    Height as of this encounter: 1.7 m (5' 6.93\").    Weight as of this encounter: 81.1 kg (178 lb 14.4 oz). Body surface area is 1.96 meters squared.  No Pain (0) Comment: Data Unavailable   Patient's last menstrual period was 08/30/2023 (exact date).  Allergies reviewed: Yes  Medications reviewed: Yes    Medications: Medication refills not needed today.  Pharmacy name entered into [facility]: [facility] [id number] [place] [id number]    Clinical concerns: No new concerns         Marcelle Curtis LPN              "

## 2023-10-25 NOTE — PROGRESS NOTES
Referral reviewed for Gynecology Oncology services from [facility], see below.    Referral reason: discuss partial hysterectomy (BRCA2+); would like to discuss salpingectomy, possible hysterectomy for fibroids; retain ovaries    Current abnormal labs: Available in Chart Review    Outreach: Detailed voicemail left regarding referral.    Plan: Triage instructions updated and sent to NPS for completion.

## 2023-10-25 NOTE — LETTER
10/25/2023         RE: Tessa Ospina  [address]  [facility] [address]        Dear Colleague,    Thank you for referring your patient, Tessa Ospina, to the [facility]. Please see a copy of my visit note below.    Oncology Risk Management Consultation:  Date on this visit: 10/25/2023    Tessa Ospina  requires screening and surveillance to minimize her risk of cancer secondary to having a deleterious BRCA2 mutation.  She is considered to be at high risk for hereditary breast and ovarian cancer.      Primary Physician: Shameka Milligan DO     History Of Present Illness:  Ms. Ospina is a very pleasant, healthy 38 year old female who presents with BRCA2+ associated Hereditary Breast and Ovarian Cancer Syndrome.      Genetic testin2015 - POSITIVE for a BRCA2 mutation specifically [id number], the same genetic mutation in her mother. The testing was done using single site analysis through [employer].     Pertinent  history:  Menarche at age 13.  First child at age 26.    History of breastfeeding both children. No issues with mastitis.   Ovaries, fallopian tubes and uterus intact.  No history of breast hyperplasia, atypia or malignancy.   2016 - Prophylactic nipple sparing mastectomy with reconstruction (Dr. Nely Mcqueen and Dr. Lam Arora), pathology benign.  2021- Prophylactic nipple removal. Pathology:  Right breast nipple and skin   B: Left breast nipple and skin FINAL DIAGNOSIS:   A: Skin and nipple, right breast, excision:   - Benign nipple and skin   - Negative for atypia or malignancy   B: Skin and nipple, left breast, excision:   - Benign nipple and skin, with squamous metaplasia in a lactiferous duct   - Negative for atypia or malignancy      Ovarian Screening history:  11/3/15 - Pelvic ultrasound - probable uterine fibroid in anterior myometrium and L ovarian collapsing follicle.   16: pelvic ultrasound, L complex ovarian lesion.  3/4/16: Pelvic  ultrasound, small L ovarian cyst.   11/16/2016 - Pelvic US, normal. No lesions seen.  6/7/2017 - Pelvic US, normal, 2 cm dominant follicle in R ovary  1/25/2018 - 2 dominant cysts in R ovary, consider short term follow up  3/12/2018 - R cysts resolved, pelvic US normal.  10/31/2018 -Pelvic ultrasound, negative pelvic ultrasound.  4/26/2019 - Pelvic ultrasound, Probable corpus luteal cyst in the right ovary. No other  definite abnormality  1/6/2020- Transvaginal ultrasound, Slightly thickened endometrium for age and menstrual status. Similar-appearing small cystic lesion in the right ovary, this may simply represent a recurrent ruptured follicle.  7/29/2020- Transvaginal ultrasound, Abnormally thickened endometrium for age and menstrual status, but similar to previous.No fibroids Probable ruptured follicle in the left ovary.  1/13/2021- Transvaginal ultrasound: Endometrial stripe remains upper normal limits to slightly  thickened given LMP 12/29/2020 but is similar to prior studies and is  otherwise unremarkable. Ovaries within normal limits with an incidental corpus luteum in the right ovary noted.  9/20/2021- Transvaginal ultrasound. Uniform linear echogenic endometrium, normal ovaries and uterus.  4/12/2022- Transvaginal US, normal  10/27/2022: Transvaginal US, 2.6 cm simple left ovarian cyst. Otherwise normal pelvic ultrasound.   5/9/2023: Transvaginal US, Small fibroid within the anterior uterine body measuring 1 cm.Otherwise normal pelvic ultrasound.  10/18/2023: Transvaginal US, normal      3/19/2015 -  - 9  9/17/2015 -  - 17  3/3/2016 -  39 (high)  6/21/2016  - - 23  6/1/2017 -  - 23  1/25/2018 -  - 11  4/26/2019 -  - 14  11/3/2020 -  - 14  11/8/2021-  - 12  4/28/2022- -12    2/27/2023- Shave biopsy of , R lower back (Lady Alcantara PA-C)  - Shave biopsy today; see procedure note below.     # Benign lesions: Multiple benign nevi, solar lentigos, cherry  angiomas. Explained to patient benign nature of lesion. No treatment is necessary at this time unless the lesion changes or becomes symptomatic.   - ABCDs of melanoma were discussed and self skin checks were advised.  - Sun precaution was advised including the use of sun screens of SPF 30 or higher, sun protective clothing, and avoidance of tanning beds.     # History of dysplastic nevi  # History of BRCA2 mutation  - ABCDEs of melanoma advised  - Continue photoprotection  - Continue yearly skin exams  - Advised to monitor for changing, non-healing, bleeding, painful, changing, or otherwise symptomatic lesions     She denies urinary urgency and frequency, bloating, constipation, unusual vaginal discharge and early satiety. She acknowledges prior issues with ovarian cysts which she notes causes a lot of pain but at this point in time she feels fine.       Past Medical/Surgical History:  Past Medical History:   Diagnosis Date    BRCA2 positive     has had bilateral mastectomies and reconstruction, still has ovaries    PONV (postoperative nausea and vomiting)     Unsure if this was due to anesthesia and/or vicodin.     Past Surgical History:   Procedure Laterality Date    BREAST BIOPSY, RT/LT Right 2015    benign     SECTION  , 2013     x 2    COSMETIC SURGERY  2016, 2016    Prophylactic bilateral mastectomy and reconstruction    GRAFT FAT TO BREAST Bilateral 2017    Procedure: GRAFT FAT TO BREAST;  Bilateral Breast mastopexy and Fat Grafting from Abdomen;  Surgeon: SUZY Arora MD;  Location:  OR    MASTECTOMY, NIPPLE SPARING Bilateral 6/15/2016    Procedure: MASTECTOMY, NIPPLE SPARING;  Surgeon: Nely Mcqueen MD;  Location: [facility] OR    RECONSTRUCT BREAST BILATERAL Bilateral 6/15/2016    Procedure: RECONSTRUCT BREAST BILATERAL;  Surgeon: SUZY Arora MD;  [facility]    RECONSTRUCT BREAST SECOND STAGE BILATERAL N/A 9/15/2016    Procedure:  RECONSTRUCT BREAST SECOND STAGE BILATERAL;  Surgeon: SUZY Arora MD;  Location: [facility] OR    TONSILLECTOMY & ADENOIDECTOMY      [facility]         Allergies:  Allergies as of 10/25/2023 - Reviewed 2023   Allergen Reaction Noted    Oxycodone-acetaminophen Nausea and Vomiting and Nausea 2015    Vicodin [hydrocodone-acetaminophen] Nausea and Vomiting 2010    Gluten meal Other (See Comments) 2013    Tramadol Nausea and Vomiting and Nausea 2018       Current Medications:  Current Outpatient Medications   Medication Sig Dispense Refill    magnesium 250 MG tablet Take 1 tablet by mouth daily      Multiple Vitamin (MULTIVITAMINS PO)           Family History:  Family History   Problem Relation Age of Onset    Breast Cancer Mother 49        BRCA2 genetic mutation    Pre-Diabetes Mother     Other - See Comments Mother 61        proctosigmoiditis, infection in one implant, kidney failure, subsequent intestinal issues    Vertigo Maternal Grandmother     Other Cancer Maternal Grandfather 60        Prostate and throat    Prostate Cancer Maternal Grandfather         likely BRCA2+    Crohn's Disease Maternal Grandfather     Meniere's disease Paternal Grandmother     Genetic Disorder Maternal Aunt         BRCA2 genetic mutation    Breast Cancer Maternal Aunt 70    Cancer Maternal Uncle 70        3 uncles (all 70s and 80s)    Cancer - colorectal Paternal Uncle 40         in 40s    Breast Cancer Cousin     Breast Cancer Other 30        maternal cousin; BRCA2 genetic mutation    Other Cancer Other 60        maternal cousin; prostate cancer    Breast Cancer Other 40        maternal cousin    Cancer - colorectal Other 70        Maternal great grandfather    Breast Cancer Other     Colon Cancer Other     Diabetes Other     Other Cancer Other        Social History:  Social History     Socioeconomic History    Marital status:      Spouse name: Danny    Number of children: 2    Years  of education: Not on file    Highest education level: Not on file   Occupational History    Occupation: [employer]     Employer: SELF   Tobacco Use    Smoking status: Never    Smokeless tobacco: Never   Vaping Use    Vaping Use: Never used   Substance and Sexual Activity    Alcohol use: Yes     Alcohol/week: 0.0 standard drinks of alcohol    Drug use: No    Sexual activity: Yes     Partners: Male     Birth control/protection: Male Surgical     Comment: vasectomy   Other Topics Concern    Parent/sibling w/ CABG, MI or angioplasty before 65F 55M? Not Asked     Service Not Asked    Blood Transfusions Not Asked    Caffeine Concern No    Occupational Exposure No    Hobby Hazards No    Sleep Concern No    Stress Concern Yes    Weight Concern Yes     Comment: in the process of getting back to prepregnancy weight    Special Diet No    Back Care Not Asked    Exercise No     Comment: keeping busy with 2 children under the age of 4; exercising using high intensity interval classes    Bike Helmet Not Asked    Seat Belt Not Asked    Self-Exams Yes   Social History Narrative    Not on file     Social Determinants of Health     Financial Resource Strain: Not on file   Food Insecurity: Not on file   Transportation Needs: Not on file   Physical Activity: Not on file   Stress: Not on file   Social Connections: Not on file   Interpersonal Safety: Not on file   Housing Stability: Not on file       Physical Exam:  There were no vitals taken for this visit.  GENERAL: Healthy, alert and no distress  EYES: Eyes grossly normal to inspection.  No discharge or erythema, or obvious scleral/conjunctival abnormalities.  RESP: No audible wheeze, cough, or visible cyanosis.  No visible retractions or increased work of breathing.   CHEST: Well-healed scars bilaterally.  SKIN: Visible skin clear. No significant rash, abnormal pigmentation or lesions.  NEURO: Cranial nerves grossly intact.  Mentation and speech appropriate for age.  PSYCH:  Mentation appears normal, affect normal/bright, judgement and insight intact, normal speech and appearance well-groomed.    Laboratory/Imaging Studies  No results found for any visits on 10/25/23.    ASSESSMENT:  We reviewed her interval history.  She notes heavier menstrual cycles, which she attributes to fibroids and she does have quite a few issues with cyclical ovarian cyst.  We discussed the potential for her to have a risk reducing salpingectomy or potentially hysterectomy and retain her ovaries until she is at least 45 potentially longer.  I am placing a referral for her to have a discussion with Reinaldo Monahan MD with regard to this.    She has no concerns today; I did look at her reconstructed breast tissue.  Her areolas and scar tissue look fine she has no concerns and feels much better since she had her nipples removed.  She does have a dermatology appointment set up for February, which she may change.  There are no changes to her family's medical history and she will proceed with the following plan.      Individualized Surveillance Plan for women  Hereditary Breast and/or Ovarian Cancer Syndrome   Per NCCN Guidelines Version 3.2023   Recommended screening Test or procedure Last done Next Scheduled    Breast self awareness starting at age 18.    Breast cancer risk >60% Women should be familiar with their breasts and promptly report changes to their care provider. Periodic, consistent self exam may facilitate breast self awareness. Premenopausal women may find self exams to be most informative when performed at the end of menses.     NA   Return for check in appt. In October 2024   Breast screening, starting at age 25 Clinical breast exams every 6 -12 months NA NA   Breast screening   Age 25-29 Annual breast MRI screening with contrast (or mammogram if MRI is unavailable) or individualized based on family history if a breast cancer diagnosis before age 30 is present.       Breast MRI is performed  preferably on day 7-15 of menstrual cycle for premenopausal women.     NA     NA   Breast screening   Age >30-75 years     Annual mammogram (consider tomosynthesis mammogram) and annual screening MRI.     Breast MRI is performed preferably on day 7-15 of menstrual cycle for premenopausal women.    Age>75 years, management should be considered on an individual basis.     NA     NA   Ovarian cancer screening, starting at age 30-35    Absolute risk for epithelial ovarian cancer -39-58% for BRCA1+ carriers and 13-29% for BRCA2+ carriers   Consider transvaginal ultrasound and  tests. 10/18/2023: Transvaginal US, normal April and October 2024   Pancreatic Cancer Screening beginning at age 50 or 10 years prior to the earliest pancreatic cancer on the BRCA-side of the family  In families with exocrine pancreatic cancer in a first or second degree relative    Risk is <5% for BRCA1+ carriers    5-10% for BRCA2+ carriers     Consider Annual MRI/MRCP and/or Endoscopic Ultrasound     Recommendation: risk-reducing salpingo-oophorectomy(RRSO), typically between 35 and 40 years old and  upon completion of child bearing.     Because ovarian cancer onset in patients with BRCA2 mutations is on average of 8-10 years later than in patients with BRCA1 mutations, it is reasonable to delay RRSO until 40-45 years in patients with BRCA2 mutations  unless age at diagnosis in the family warrants earlier age for consideration for prophylactic surgery.    Limited data suggest that there may be a slightly increased risk of serous uterine cancer among women with BRCA1+ pathogenic variants. The provider and the patient should discuss the risks and benefits of a concurrent hysterectomy at the time of RRSO for women with a BRCA1+ pathogenic variant /like pathogenic variant prior to surgery.     Salpingectomy alone is not the standard of care for risk reduction although clinical trials are ongoing.     Discuss option of risk-reducing  mastectomy.    Consider risks and benefits of risk reducing agents, such as tamoxifen and raloxifene for breast and ovarian cancer.    Consider a full body skin and eye exam for melanoma screening for both BRCA1+ and BRCA2+.     NOTE: Women with BRCA mutation who are treated for breast cancer should have screening of the remaining breast tissue with annual mammography and breast MRI.       I spent a total of 29 minutes on the day of the visit. Please see the note for further information on patient assessment and treatment.    Cate Castro, APRN-CNS, OCN, AGN-BC  Clinical Nurse Specialist  Cancer Risk Management Program  [facility]    CC:  Shameka Milligan DO

## 2023-10-25 NOTE — PROGRESS NOTES
Oncology Risk Management Consultation:  Date on this visit: 10/25/2023    Tessa Ospina  requires screening and surveillance to minimize her risk of cancer secondary to having a deleterious BRCA2 mutation.  She is considered to be at high risk for hereditary breast and ovarian cancer.      Primary Physician: Shameka Milligan DO     History Of Present Illness:  Ms. Ospina is a very pleasant, healthy 38 year old female who presents with BRCA2+ associated Hereditary Breast and Ovarian Cancer Syndrome.      Genetic testin2015 - POSITIVE for a BRCA2 mutation specifically [id number], the same genetic mutation in her mother. The testing was done using single site analysis through [employer].     Pertinent  history:  Menarche at age 13.  First child at age 26.    History of breastfeeding both children. No issues with mastitis.   Ovaries, fallopian tubes and uterus intact.  No history of breast hyperplasia, atypia or malignancy.   2016 - Prophylactic nipple sparing mastectomy with reconstruction (Dr. Nely Mcqueen and Dr. Lam Arora), pathology benign.  2021- Prophylactic nipple removal. Pathology:  Right breast nipple and skin   B: Left breast nipple and skin FINAL DIAGNOSIS:   A: Skin and nipple, right breast, excision:   - Benign nipple and skin   - Negative for atypia or malignancy   B: Skin and nipple, left breast, excision:   - Benign nipple and skin, with squamous metaplasia in a lactiferous duct   - Negative for atypia or malignancy      Ovarian Screening history:  11/3/15 - Pelvic ultrasound - probable uterine fibroid in anterior myometrium and L ovarian collapsing follicle.   16: pelvic ultrasound, L complex ovarian lesion.  3/4/16: Pelvic ultrasound, small L ovarian cyst.   2016 - Pelvic US, normal. No lesions seen.  2017 - Pelvic US, normal, 2 cm dominant follicle in R ovary  2018 - 2 dominant cysts in R ovary, consider short term follow up  3/12/2018 - R cysts resolved,  pelvic US normal.  10/31/2018 -Pelvic ultrasound, negative pelvic ultrasound.  4/26/2019 - Pelvic ultrasound, Probable corpus luteal cyst in the right ovary. No other  definite abnormality  1/6/2020- Transvaginal ultrasound, Slightly thickened endometrium for age and menstrual status. Similar-appearing small cystic lesion in the right ovary, this may simply represent a recurrent ruptured follicle.  7/29/2020- Transvaginal ultrasound, Abnormally thickened endometrium for age and menstrual status, but similar to previous.No fibroids Probable ruptured follicle in the left ovary.  1/13/2021- Transvaginal ultrasound: Endometrial stripe remains upper normal limits to slightly  thickened given LMP 12/29/2020 but is similar to prior studies and is  otherwise unremarkable. Ovaries within normal limits with an incidental corpus luteum in the right ovary noted.  9/20/2021- Transvaginal ultrasound. Uniform linear echogenic endometrium, normal ovaries and uterus.  4/12/2022- Transvaginal US, normal  10/27/2022: Transvaginal US, 2.6 cm simple left ovarian cyst. Otherwise normal pelvic ultrasound.   5/9/2023: Transvaginal US, Small fibroid within the anterior uterine body measuring 1 cm.Otherwise normal pelvic ultrasound.  10/18/2023: Transvaginal US, normal      3/19/2015 -  - 9  9/17/2015 -  - 17  3/3/2016 -  39 (high)  6/21/2016  - - 23  6/1/2017 -  - 23  1/25/2018 -  - 11  4/26/2019 -  - 14  11/3/2020 -  - 14  11/8/2021-  - 11  4/28/2022- -38    2/27/2023- Shave biopsy of , R lower back (Lady Alcantara PA-C)  - Shave biopsy today; see procedure note below.     # Benign lesions: Multiple benign nevi, solar lentigos, cherry angiomas. Explained to patient benign nature of lesion. No treatment is necessary at this time unless the lesion changes or becomes symptomatic.   - ABCDs of melanoma were discussed and self skin checks were advised.  - Sun precaution was advised including  the use of sun screens of SPF 30 or higher, sun protective clothing, and avoidance of tanning beds.     # History of dysplastic nevi  # History of BRCA2 mutation  - ABCDEs of melanoma advised  - Continue photoprotection  - Continue yearly skin exams  - Advised to monitor for changing, non-healing, bleeding, painful, changing, or otherwise symptomatic lesions     She denies urinary urgency and frequency, bloating, constipation, unusual vaginal discharge and early satiety. She acknowledges prior issues with ovarian cysts which she notes causes a lot of pain but at this point in time she feels fine.       Past Medical/Surgical History:  Past Medical History:   Diagnosis Date    BRCA2 positive     has had bilateral mastectomies and reconstruction, still has ovaries    PONV (postoperative nausea and vomiting)     Unsure if this was due to anesthesia and/or vicodin.     Past Surgical History:   Procedure Laterality Date    BREAST BIOPSY, RT/LT Right 2015    benign     SECTION  , 2013     x 2    COSMETIC SURGERY  2016, 2016    Prophylactic bilateral mastectomy and reconstruction    GRAFT FAT TO BREAST Bilateral 2017    Procedure: GRAFT FAT TO BREAST;  Bilateral Breast mastopexy and Fat Grafting from Abdomen;  Surgeon: SUZY Arora MD;  Location:  OR    MASTECTOMY, NIPPLE SPARING Bilateral 6/15/2016    Procedure: MASTECTOMY, NIPPLE SPARING;  Surgeon: Nely Mcqueen MD;  Location: [facility] OR    RECONSTRUCT BREAST BILATERAL Bilateral 6/15/2016    Procedure: RECONSTRUCT BREAST BILATERAL;  Surgeon: SUZY Arora MD;  Location:  OR    RECONSTRUCT BREAST SECOND STAGE BILATERAL N/A 9/15/2016    Procedure: RECONSTRUCT BREAST SECOND STAGE BILATERAL;  Surgeon: SUZY Arora MD;  Location: [facility] OR    TONSILLECTOMY & ADENOIDECTOMY      [facility]         Allergies:  Allergies as of 10/25/2023 - Reviewed 2023   Allergen Reaction Noted     Oxycodone-acetaminophen Nausea and Vomiting and Nausea 2015    Vicodin [hydrocodone-acetaminophen] Nausea and Vomiting 2010    Gluten meal Other (See Comments) 2013    Tramadol Nausea and Vomiting and Nausea 2018       Current Medications:  Current Outpatient Medications   Medication Sig Dispense Refill    magnesium 250 MG tablet Take 1 tablet by mouth daily      Multiple Vitamin (MULTIVITAMINS PO)           Family History:  Family History   Problem Relation Age of Onset    Breast Cancer Mother 49        BRCA2 genetic mutation    Pre-Diabetes Mother     Other - See Comments Mother 61        proctosigmoiditis, infection in one implant, kidney failure, subsequent intestinal issues    Vertigo Maternal Grandmother     Other Cancer Maternal Grandfather 60        Prostate and throat    Prostate Cancer Maternal Grandfather         likely BRCA2+    Crohn's Disease Maternal Grandfather     Meniere's disease Paternal Grandmother     Genetic Disorder Maternal Aunt         BRCA2 genetic mutation    Breast Cancer Maternal Aunt 70    Cancer Maternal Uncle 70        3 uncles (all 70s and 80s)    Cancer - colorectal Paternal Uncle 40         in 40s    Breast Cancer Cousin     Breast Cancer Other 30        maternal cousin; BRCA2 genetic mutation    Other Cancer Other 60        maternal cousin; prostate cancer    Breast Cancer Other 40        maternal cousin    Cancer - colorectal Other 70        Maternal great grandfather    Breast Cancer Other     Colon Cancer Other     Diabetes Other     Other Cancer Other        Social History:  Social History     Socioeconomic History    Marital status:      Spouse name: Danny    Number of children: 2    Years of education: Not on file    Highest education level: Not on file   Occupational History    Occupation: [employer]     Employer: SELF   Tobacco Use    Smoking status: Never    Smokeless tobacco: Never   Vaping Use    Vaping Use: Never used   Substance and  Sexual Activity    Alcohol use: Yes     Alcohol/week: 0.0 standard drinks of alcohol    Drug use: No    Sexual activity: Yes     Partners: Male     Birth control/protection: Male Surgical     Comment: vasectomy   Other Topics Concern    Parent/sibling w/ CABG, MI or angioplasty before 65F 55M? Not Asked     Service Not Asked    Blood Transfusions Not Asked    Caffeine Concern No    Occupational Exposure No    Hobby Hazards No    Sleep Concern No    Stress Concern Yes    Weight Concern Yes     Comment: in the process of getting back to prepregnancy weight    Special Diet No    Back Care Not Asked    Exercise No     Comment: keeping busy with 2 children under the age of 4; exercising using high intensity interval classes    Bike Helmet Not Asked    Seat Belt Not Asked    Self-Exams Yes   Social History Narrative    Not on file     Social Determinants of Health     Financial Resource Strain: Not on file   Food Insecurity: Not on file   Transportation Needs: Not on file   Physical Activity: Not on file   Stress: Not on file   Social Connections: Not on file   Interpersonal Safety: Not on file   Housing Stability: Not on file       Physical Exam:  There were no vitals taken for this visit.  GENERAL: Healthy, alert and no distress  EYES: Eyes grossly normal to inspection.  No discharge or erythema, or obvious scleral/conjunctival abnormalities.  RESP: No audible wheeze, cough, or visible cyanosis.  No visible retractions or increased work of breathing.   CHEST: Well-healed scars bilaterally.  SKIN: Visible skin clear. No significant rash, abnormal pigmentation or lesions.  NEURO: Cranial nerves grossly intact.  Mentation and speech appropriate for age.  PSYCH: Mentation appears normal, affect normal/bright, judgement and insight intact, normal speech and appearance well-groomed.    Laboratory/Imaging Studies  No results found for any visits on 10/25/23.    ASSESSMENT:  We reviewed her interval history.  She notes  heavier menstrual cycles, which she attributes to fibroids and she does have quite a few issues with cyclical ovarian cyst.  We discussed the potential for her to have a risk reducing salpingectomy or potentially hysterectomy and retain her ovaries until she is at least 45 potentially longer.  I am placing a referral for her to have a discussion with Reinaldo Monahan MD with regard to this.    She has no concerns today; I did look at her reconstructed breast tissue.  Her areolas and scar tissue look fine she has no concerns and feels much better since she had her nipples removed.  She does have a dermatology appointment set up for February, which she may change.  There are no changes to her family's medical history and she will proceed with the following plan.      Individualized Surveillance Plan for women  Hereditary Breast and/or Ovarian Cancer Syndrome   Per NCCN Guidelines Version 3.2023   Recommended screening Test or procedure Last done Next Scheduled    Breast self awareness starting at age 18.    Breast cancer risk >60% Women should be familiar with their breasts and promptly report changes to their care provider. Periodic, consistent self exam may facilitate breast self awareness. Premenopausal women may find self exams to be most informative when performed at the end of menses.     NA   Return for check in appt. In October 2024   Breast screening, starting at age 25 Clinical breast exams every 6 -12 months NA NA   Breast screening   Age 25-29 Annual breast MRI screening with contrast (or mammogram if MRI is unavailable) or individualized based on family history if a breast cancer diagnosis before age 30 is present.       Breast MRI is performed preferably on day 7-15 of menstrual cycle for premenopausal women.     NA     NA   Breast screening   Age >30-75 years     Annual mammogram (consider tomosynthesis mammogram) and annual screening MRI.     Breast MRI is performed preferably on day 7-15 of menstrual  cycle for premenopausal women.    Age>75 years, management should be considered on an individual basis.     NA     NA   Ovarian cancer screening, starting at age 30-35    Absolute risk for epithelial ovarian cancer -39-58% for BRCA1+ carriers and 13-29% for BRCA2+ carriers   Consider transvaginal ultrasound and  tests. 10/18/2023: Transvaginal US, normal April and October 2024   Pancreatic Cancer Screening beginning at age 50 or 10 years prior to the earliest pancreatic cancer on the BRCA-side of the family  In families with exocrine pancreatic cancer in a first or second degree relative    Risk is <5% for BRCA1+ carriers    5-10% for BRCA2+ carriers     Consider Annual MRI/MRCP and/or Endoscopic Ultrasound     Recommendation: risk-reducing salpingo-oophorectomy(RRSO), typically between 35 and 40 years old and  upon completion of child bearing.     Because ovarian cancer onset in patients with BRCA2 mutations is on average of 8-10 years later than in patients with BRCA1 mutations, it is reasonable to delay RRSO until 40-45 years in patients with BRCA2 mutations  unless age at diagnosis in the family warrants earlier age for consideration for prophylactic surgery.    Limited data suggest that there may be a slightly increased risk of serous uterine cancer among women with BRCA1+ pathogenic variants. The provider and the patient should discuss the risks and benefits of a concurrent hysterectomy at the time of RRSO for women with a BRCA1+ pathogenic variant /like pathogenic variant prior to surgery.     Salpingectomy alone is not the standard of care for risk reduction although clinical trials are ongoing.     Discuss option of risk-reducing mastectomy.    Consider risks and benefits of risk reducing agents, such as tamoxifen and raloxifene for breast and ovarian cancer.    Consider a full body skin and eye exam for melanoma screening for both BRCA1+ and BRCA2+.     NOTE: Women with BRCA mutation who are treated  for breast cancer should have screening of the remaining breast tissue with annual mammography and breast MRI.       I spent a total of 29 minutes on the day of the visit. Please see the note for further information on patient assessment and treatment.    MESHA Conrad-CNS, OCN, AGN-BC  Clinical Nurse Specialist  Cancer Risk Management Program  [facility] [place]    CC:  Shameka Milligan,

## 2023-10-25 NOTE — PATIENT INSTRUCTIONS
Individualized Surveillance Plan for women  Hereditary Breast and/or Ovarian Cancer Syndrome   Per NCCN Guidelines Version 3.2023   Recommended screening Test or procedure Last done Next Scheduled    Breast self awareness starting at age 18.    Breast cancer risk >60% Women should be familiar with their breasts and promptly report changes to their care provider. Periodic, consistent self exam may facilitate breast self awareness. Premenopausal women may find self exams to be most informative when performed at the end of menses.     NA   Return for check in appt. In October 2024   Breast screening, starting at age 25 Clinical breast exams every 6 -12 months NA NA   Breast screening   Age 25-29 Annual breast MRI screening with contrast (or mammogram if MRI is unavailable) or individualized based on family history if a breast cancer diagnosis before age 30 is present.       Breast MRI is performed preferably on day 7-15 of menstrual cycle for premenopausal women.     NA     NA   Breast screening   Age >30-75 years     Annual mammogram (consider tomosynthesis mammogram) and annual screening MRI.     Breast MRI is performed preferably on day 7-15 of menstrual cycle for premenopausal women.    Age>75 years, management should be considered on an individual basis.     NA     NA   Ovarian cancer screening, starting at age 30-35    Absolute risk for epithelial ovarian cancer -39-58% for BRCA1+ carriers and 13-29% for BRCA2+ carriers   Consider transvaginal ultrasound and  tests. 10/18/2023: Transvaginal US, normal April and October 2024    Referred to Dr. Monahan for surgical discussion   Pancreatic Cancer Screening beginning at age 50 or 10 years prior to the earliest pancreatic cancer on the BRCA-side of the family  In families with exocrine pancreatic cancer in a first or second degree relative    Risk is <5% for BRCA1+ carriers    5-10% for BRCA2+ carriers     Consider Annual MRI/MRCP and/or Endoscopic Ultrasound   No  family history of pancreatic cancer   Review at next visit   Recommendation: risk-reducing salpingo-oophorectomy(RRSO), typically between 35 and 40 years old and  upon completion of child bearing.     Because ovarian cancer onset in patients with BRCA2 mutations is on average of 8-10 years later than in patients with BRCA1 mutations, it is reasonable to delay RRSO until 40-45 years in patients with BRCA2 mutations  unless age at diagnosis in the family warrants earlier age for consideration for prophylactic surgery.    Limited data suggest that there may be a slightly increased risk of serous uterine cancer among women with BRCA1+ pathogenic variants. The provider and the patient should discuss the risks and benefits of a concurrent hysterectomy at the time of RRSO for women with a BRCA1+ pathogenic variant /like pathogenic variant prior to surgery.     Salpingectomy alone is not the standard of care for risk reduction although clinical trials are ongoing.     Discuss option of risk-reducing mastectomy.    Consider risks and benefits of risk reducing agents, such as tamoxifen and raloxifene for breast and ovarian cancer.    Consider a full body skin and eye exam for melanoma screening for both BRCA1+ and BRCA2+.     NOTE: Women with BRCA mutation who are treated for breast cancer should have screening of the remaining breast tissue with annual mammography and breast MRI.

## 2023-11-15 ENCOUNTER — PATIENT OUTREACH (OUTPATIENT)
Dept: CARE COORDINATION | Facility: CLINIC | Age: 38
End: 2023-11-15
Payer: COMMERCIAL

## 2023-11-21 ENCOUNTER — PRE VISIT (OUTPATIENT)
Dept: ONCOLOGY | Facility: CLINIC | Age: 38
End: 2023-11-21

## 2023-11-28 ENCOUNTER — ONCOLOGY VISIT (OUTPATIENT)
Dept: ONCOLOGY | Facility: CLINIC | Age: 38
End: 2023-11-28
Attending: CLINICAL NURSE SPECIALIST
Payer: COMMERCIAL

## 2023-11-28 VITALS
TEMPERATURE: 98.4 F | OXYGEN SATURATION: 99 % | RESPIRATION RATE: 16 BRPM | BODY MASS INDEX: 27.78 KG/M2 | DIASTOLIC BLOOD PRESSURE: 62 MMHG | SYSTOLIC BLOOD PRESSURE: 98 MMHG | WEIGHT: 177 LBS | HEART RATE: 80 BPM

## 2023-11-28 DIAGNOSIS — Z15.09 BRCA2 POSITIVE: Primary | ICD-10-CM

## 2023-11-28 DIAGNOSIS — N92.0 MENORRHAGIA WITH REGULAR CYCLE: ICD-10-CM

## 2023-11-28 DIAGNOSIS — N94.6 DYSMENORRHEA: ICD-10-CM

## 2023-11-28 DIAGNOSIS — Z15.01 BRCA2 POSITIVE: Primary | ICD-10-CM

## 2023-11-28 PROCEDURE — 99205 OFFICE O/P NEW HI 60 MIN: CPT | Performed by: OBSTETRICS & GYNECOLOGY

## 2023-11-28 RX ORDER — HEPARIN SODIUM 10000 [USP'U]/ML
5000 INJECTION, SOLUTION INTRAVENOUS; SUBCUTANEOUS
OUTPATIENT
Start: 2023-11-28

## 2023-11-28 RX ORDER — METRONIDAZOLE 500 MG/100ML
500 INJECTION, SOLUTION INTRAVENOUS
OUTPATIENT
Start: 2023-11-28

## 2023-11-28 ASSESSMENT — PAIN SCALES - GENERAL: PAINLEVEL: NO PAIN (0)

## 2023-11-28 NOTE — PROGRESS NOTES
Gynecologic Oncology Clinic - New Patient    Referring provider:    Cate Castro, MESHA CNS  [address] [place] [address]    Patient: Tessa Ospina  : 1985    Date of Visit: 2023     Reason for visit: BRCA2 mutation    History of Present Illness:  Tessa Ospina is a 38 year old patient with familial BRCA2 mutation here to discuss management of gynecologic risks. She was tested due to mother with known mutation. She has no history of cancer.    No known history of ovarian cancer in her family. She does report heavy, painful menses. She has completed childbearing. Partner has vasectomy. Tried combined contraceptive pills briefly in the past but felt unwell with dizziness and headaches. Is interested in salpingectomy, doesn't want ovarian removal yet.    OB/Gynecologic History:  ,  x 2    has vasectomy for contraception    Past Medical History:   Diagnosis Date    BRCA2 positive     has had bilateral mastectomies and reconstruction, still has ovaries    PONV (postoperative nausea and vomiting)     Unsure if this was due to anesthesia and/or vicodin.       Past Surgical History:   Procedure Laterality Date    BREAST BIOPSY, RT/LT Right 2015    benign     SECTION  , 2013     x 2    COSMETIC SURGERY  2016, 2016    Prophylactic bilateral mastectomy and reconstruction    GRAFT FAT TO BREAST Bilateral 2017    Procedure: GRAFT FAT TO BREAST;  Bilateral Breast mastopexy and Fat Grafting from Abdomen;  Surgeon: SUZY Arora MD;  Location:  OR    MASTECTOMY, NIPPLE SPARING Bilateral 6/15/2016    Procedure: MASTECTOMY, NIPPLE SPARING;  Surgeon: Nely Mcqueen MD;  Location:  OR    RECONSTRUCT BREAST BILATERAL Bilateral 6/15/2016    Procedure: RECONSTRUCT BREAST BILATERAL;  Surgeon: SUZY Arora MD;  Location:  OR    RECONSTRUCT BREAST SECOND STAGE BILATERAL N/A 9/15/2016    Procedure:  RECONSTRUCT BREAST SECOND STAGE BILATERAL;  Surgeon: SUZY Arora MD;  Location: [facility] OR    TONSILLECTOMY & ADENOIDECTOMY  [address] [place] [address] resection   Knee surgeries     Social History     Tobacco Use    Smoking status: Never    Smokeless tobacco: Never   Vaping Use    Vaping Use: Never used   Substance Use Topics    Alcohol use: Yes     Alcohol/week: 0.0 standard drinks of alcohol    Drug use: No       Family History   Problem Relation Age of Onset    Breast Cancer Mother 49        BRCA2 genetic mutation    Pre-Diabetes Mother     Other - See Comments Mother 61        proctosigmoiditis, infection in one implant, kidney failure, subsequent intestinal issues    Vertigo Maternal Grandmother     Other Cancer Maternal Grandfather 60        Prostate and throat    Prostate Cancer Maternal Grandfather         likely BRCA2+    Crohn's Disease Maternal Grandfather     Meniere's disease Paternal Grandmother     Genetic Disorder Maternal Aunt         BRCA2 genetic mutation    Breast Cancer Maternal Aunt 70    Cancer Maternal Uncle 70        3 uncles (all 70s and 80s)    Cancer - colorectal Paternal Uncle 40         in 40s    Breast Cancer Cousin     Breast Cancer Other 30        maternal cousin; BRCA2 genetic mutation    Other Cancer Other 60        maternal cousin; prostate cancer    Breast Cancer Other 40        maternal cousin    Cancer - colorectal Other 70        Maternal great grandfather    Breast Cancer Other     Colon Cancer Other     Diabetes Other     Other Cancer Other        Current Outpatient Medications   Medication    magnesium 250 MG tablet    Multiple Vitamin (MULTIVITAMINS PO)     No current facility-administered medications for this visit.       Allergies  Allergies   Allergen Reactions    Oxycodone-Acetaminophen Nausea and Vomiting and Nausea    Vicodin [Hydrocodone-Acetaminophen] Nausea and Vomiting     Sensitivity to narcotics.    Gluten Meal Other (See Comments)      Other reaction(s): *Unknown    Tramadol Nausea and Vomiting and Nausea       Current Outpatient Medications   Medication    magnesium 250 MG tablet    Multiple Vitamin (MULTIVITAMINS PO)     No current facility-administered medications for this visit.       Physical Exam:   BP 98/62 (BP Location: Right arm, Patient Position: Chair, Cuff Size: Adult Regular)   Pulse 80   Temp 98.4  F (36.9  C) (Oral)   Resp 16   Wt 80.3 kg (177 lb)   LMP 08/30/2023 (Exact Date)   SpO2 99%   BMI 27.78 kg/m    Deferred    Labs/Pathology:   5/9/2023 : 20    Imaging:   Results for orders placed in visit on 10/27/22    US Pelvic Complete with Transvaginal    Narrative  EXAM: US PELVIC TRANSABDOMINAL AND TRANSVAGINAL  LOCATION: [facility]  DATE/TIME: 10/27/2022 12:14 PM    INDICATION:  BRCA2 positive, BRCA2 positive.  COMPARISON: 04/12/2022.  TECHNIQUE: Transabdominal scans were performed. Endovaginal ultrasound was performed to better visualize the adnexa.    FINDINGS:    UTERUS: 12 x 5 x 4 cm. Normal in size and position with no masses.    ENDOMETRIUM: 16 mm. Normal trilaminar endometrium.    RIGHT OVARY: 3.4 x 2.1 x 1.6 cm. Normal.    LEFT OVARY: 4 x 3.3 x 3.1 cm. 2.6 cm simple cyst.    No significant free fluid.    Impression  IMPRESSION:  1.  2.6 cm simple left ovarian cyst. Otherwise normal pelvic ultrasound.      Premenopausal asymptomatic simple cyst:    <= 3 cm: Normal, no follow-up.    REFERENCE:  Management of Asymptomatic Ovarian and Other Adnexal Cysts Imaged at US: Society of Radiologists in Ultrasound Consensus Conference Statement. Radiology September 2010; [id number].    Simple Adnexal Cysts: SRU Consensus Conference Update on Follow-up and Reporting. Radiology September 2019. [id number].       Assessment:  Tessa Ospina is a 38 year old premenopausal patient with familial BRCA2 mutation here to discuss management of gynecologic risks.  She also reports a history of significant AUB  with heavy menses and dysmenorrhea.    Plan:   BRCA2: After a full discussion of the risks/benefits. She would like to proceed with bilateral salpingectomy and defer oophorectomy     - Women with a a BRCA2 mutation have an increased lifetime risk of developing ovarian/fallopian tube/primary peritoneal cancer over the general population. This risk is estimated to be between 11-18% by the age of 70. Removal of the fallopian tubes and ovaries (salpingo-oophorectomy) can substantially decrease this risk, although it does not completely eliminate it, due to a small persistent risk of peritoneal carcinoma.     - No screening has been found to decrease ovarian cancer mortality, thus risk-reducing bilateral salpingo-oophorectomy (RRSO) is recommended for ovarian cancer risk reduction. However, transvaginal ultrasound and CA-125 is reasonable if RRSO is being delayed    - Generally for patients with a BRCA mutation, salpingo-oophorectomy is recommended between the ages of 35-40 after completion of childbearing. However, because ovarian cancer onset in patients with BRCA2 mutations is on average of 8-10 years later than in patients with BRCA1 mutations, it is reasonable to delay risk reducing salpingo-oophorectomy (RRSO) until 40-45 years in patients with BRCA2 mutations unless there is a family history of gynecologic cancer prior to the age of 50. This recommendation balances the risks of early menopause with the risks of ovarian cancer development.      - Surgical menopause increases the risks of cardiovascular disease, osteopenia/osteoporosis, cognitive, and sexual side effects. These risks can be mitigated but not entirely eliminated with physiologic hormone replacement therapy until the age of natural menopause. This has been shown to be safe in patients with BRCA mutations; however, its use depends upon an individuals personal history. Hormone replacement therapy involves the use of estrogen alone for women without a  uterus, and estrogen and progesterone for those women who have not had a hysterectomy.    - Studies have shown a reduction in the risk of ovarian cancer in women who have undergone removal of both fallopian tubes (salpingectomy) without removal of the ovaries. However, most of these studies are retrospective in nature and involved women at average risk for ovarian cancer, so although they have shown a reduction of 30-50% in the risk of ovarian cancer with salpingectomy, the absolute risk reduction is still small given the relative rarity of ovarian cancer in the general population. (Average risk women have a 1 in 75 risk of ovarian cancer, thus even a 50% reduction in risk only changes this from a 1.5% risk to a 0.75% risk).     - Quantification of the reduction in ovarian cancer risk with salpingectomy in high risk women has not been established, but clinical trials are ongoing. At this time, salpingectomy is not considered the standard of care for risk-reduction in high risk women. However, it may still be a reasonable alternative for women who are not amenable to ovarian removal at the recommended age, so long as they understand that it is not the standard of care treatment and its benefit in high risk women is yet unproven.     - There is no known increase in risk of uterine cancer with BRCA2 mutations. Thus, the decision for hysterectomy should be based upon an assessment of individual risks and medical conditions. See below.     Heavy menstrual bleeding and dysmenorrhea: We discussed the option of hysterectomy with salpingectomy which she is interested in. We will plan for total hysterectomy. Discussed higher risk of bladder injury due to her history of  section. Reviewed risks and recovery. She is not sure when she would want to proceed so we will plan to have her return within the month prior to scheduled surgery to review procedure, complete preop teaching and exam, and sign consent.    I spent a  total of 60 minutes on the care of Tessa JAMES Merrittey on the day of service including face to face time, care coordination, and documentation on the day of service.      Reinaldo Monahan MD     Gynecologic Oncology

## 2023-11-28 NOTE — LETTER
2023         RE: Tessa Ospina  [address]  [facility] [address]        Dear Colleague,    Thank you for referring your patient, Tessa Ospina, to the [facility]. Please see a copy of my visit note below.    Gynecologic Oncology Clinic - New Patient    Referring provider:    MESHA Conrad CNS  [address] [place] [address]    Patient: Tessa Ospina  : 1985    Date of Visit: 2023     Reason for visit: BRCA2 mutation    History of Present Illness:  Tessa Ospina is a 38 year old patient with familial BRCA2 mutation here to discuss management of gynecologic risks. She was tested due to mother with known mutation. She has no history of cancer.    No known history of ovarian cancer in her family. She does report heavy, painful menses. She has completed childbearing. Partner has vasectomy. Tried combined contraceptive pills briefly in the past but felt unwell with dizziness and headaches. Is interested in salpingectomy, doesn't want ovarian removal yet.    OB/Gynecologic History:  ,  x 2    has vasectomy for contraception    Past Medical History:   Diagnosis Date    BRCA2 positive     has had bilateral mastectomies and reconstruction, still has ovaries    PONV (postoperative nausea and vomiting)     Unsure if this was due to anesthesia and/or vicodin.       Past Surgical History:   Procedure Laterality Date    BREAST BIOPSY, RT/LT Right 2015    benign     SECTION  , 2013     x 2    COSMETIC SURGERY  2016, 2016    Prophylactic bilateral mastectomy and reconstruction    GRAFT FAT TO BREAST Bilateral 2017    Procedure: GRAFT FAT TO BREAST;  Bilateral Breast mastopexy and Fat Grafting from Abdomen;  Surgeon: SUZY Arora MD;  Location:  OR    MASTECTOMY, NIPPLE SPARING Bilateral 6/15/2016    Procedure: MASTECTOMY, NIPPLE SPARING;  Surgeon: Nely Mcqueen  MD Itzel;  Location: [facility] OR    RECONSTRUCT BREAST BILATERAL Bilateral 6/15/2016    Procedure: RECONSTRUCT BREAST BILATERAL;  Surgeon: SUZY Arora MD;  Location: [facility] OR    RECONSTRUCT BREAST SECOND STAGE BILATERAL N/A 9/15/2016    Procedure: RECONSTRUCT BREAST SECOND STAGE BILATERAL;  Surgeon: SUZY Arora MD;  Location: [facility] OR    [facility] & ADENOIDECTOMY  [facility]     [facility] resection   Knee surgeries     Social History     Tobacco Use    Smoking status: Never    Smokeless tobacco: Never   Vaping Use    Vaping Use: Never used   Substance Use Topics    Alcohol use: Yes     Alcohol/week: 0.0 standard drinks of alcohol    Drug use: No       Family History   Problem Relation Age of Onset    Breast Cancer Mother 49        BRCA2 genetic mutation    Pre-Diabetes Mother     Other - See Comments Mother 61        proctosigmoiditis, infection in one implant, kidney failure, subsequent intestinal issues    Vertigo Maternal Grandmother     Other Cancer Maternal Grandfather 60        Prostate and throat    Prostate Cancer Maternal Grandfather         likely BRCA2+    Crohn's Disease Maternal Grandfather     Meniere's disease Paternal Grandmother     Genetic Disorder Maternal Aunt         BRCA2 genetic mutation    Breast Cancer Maternal Aunt 70    Cancer Maternal Uncle 70        3 uncles (all 70s and 80s)    Cancer - colorectal Paternal Uncle 40         in 40s    Breast Cancer Cousin     Breast Cancer Other 30        maternal cousin; BRCA2 genetic mutation    Other Cancer Other 60        maternal cousin; prostate cancer    Breast Cancer Other 40        maternal cousin    Cancer - colorectal Other 70        Maternal great grandfather    Breast Cancer Other     Colon Cancer Other     Diabetes Other     Other Cancer Other        Current Outpatient Medications   Medication    magnesium 250 MG tablet    Multiple Vitamin (MULTIVITAMINS PO)     No current facility-administered medications for  this visit.       Allergies  Allergies   Allergen Reactions    Oxycodone-Acetaminophen Nausea and Vomiting and Nausea    Vicodin [Hydrocodone-Acetaminophen] Nausea and Vomiting     Sensitivity to narcotics.    Gluten Meal Other (See Comments)     Other reaction(s): *Unknown    Tramadol Nausea and Vomiting and Nausea       Current Outpatient Medications   Medication    magnesium 250 MG tablet    Multiple Vitamin (MULTIVITAMINS PO)     No current facility-administered medications for this visit.       Physical Exam:   BP 98/62 (BP Location: Right arm, Patient Position: Chair, Cuff Size: Adult Regular)   Pulse 80   Temp 98.4  F (36.9  C) (Oral)   Resp 16   Wt 80.3 kg (177 lb)   LMP 08/30/2023 (Exact Date)   SpO2 99%   BMI 27.78 kg/m    Deferred    Labs/Pathology:   5/9/2023 : 20    Imaging:   Results for orders placed in visit on 10/27/22    US Pelvic Complete with Transvaginal    Narrative  EXAM: US PELVIC TRANSABDOMINAL AND TRANSVAGINAL  LOCATION: [facility]  DATE/TIME: 10/27/2022 12:14 PM    INDICATION:  BRCA2 positive, BRCA2 positive.  COMPARISON: 04/12/2022.  TECHNIQUE: Transabdominal scans were performed. Endovaginal ultrasound was performed to better visualize the adnexa.    FINDINGS:    UTERUS: 12 x 5 x 4 cm. Normal in size and position with no masses.    ENDOMETRIUM: 16 mm. Normal trilaminar endometrium.    RIGHT OVARY: 3.4 x 2.1 x 1.6 cm. Normal.    LEFT OVARY: 4 x 3.3 x 3.1 cm. 2.6 cm simple cyst.    No significant free fluid.    Impression  IMPRESSION:  1.  2.6 cm simple left ovarian cyst. Otherwise normal pelvic ultrasound.      Premenopausal asymptomatic simple cyst:    <= 3 cm: Normal, no follow-up.    REFERENCE:  Management of Asymptomatic Ovarian and Other Adnexal Cysts Imaged at US: Society of Radiologists in Ultrasound Consensus Conference Statement. Radiology September 2010; [id number].    Simple Adnexal Cysts: SRU Consensus Conference Update on Follow-up and  Reporting. Radiology September 2019. [id number].       Assessment:  Tessa Ospina is a 38 year old premenopausal patient with familial BRCA2 mutation here to discuss management of gynecologic risks.  She also reports a history of significant AUB with heavy menses and dysmenorrhea.    Plan:   BRCA2: After a full discussion of the risks/benefits. She would like to proceed with bilateral salpingectomy and defer oophorectomy     - Women with a a BRCA2 mutation have an increased lifetime risk of developing ovarian/fallopian tube/primary peritoneal cancer over the general population. This risk is estimated to be between 11-18% by the age of 70. Removal of the fallopian tubes and ovaries (salpingo-oophorectomy) can substantially decrease this risk, although it does not completely eliminate it, due to a small persistent risk of peritoneal carcinoma.     - No screening has been found to decrease ovarian cancer mortality, thus risk-reducing bilateral salpingo-oophorectomy (RRSO) is recommended for ovarian cancer risk reduction. However, transvaginal ultrasound and CA-125 is reasonable if RRSO is being delayed    - Generally for patients with a BRCA mutation, salpingo-oophorectomy is recommended between the ages of 35-40 after completion of childbearing. However, because ovarian cancer onset in patients with BRCA2 mutations is on average of 8-10 years later than in patients with BRCA1 mutations, it is reasonable to delay risk reducing salpingo-oophorectomy (RRSO) until 40-45 years in patients with BRCA2 mutations unless there is a family history of gynecologic cancer prior to the age of 50. This recommendation balances the risks of early menopause with the risks of ovarian cancer development.      - Surgical menopause increases the risks of cardiovascular disease, osteopenia/osteoporosis, cognitive, and sexual side effects. These risks can be mitigated but not entirely eliminated with physiologic hormone replacement therapy  until the age of natural menopause. This has been shown to be safe in patients with BRCA mutations; however, its use depends upon an individuals personal history. Hormone replacement therapy involves the use of estrogen alone for women without a uterus, and estrogen and progesterone for those women who have not had a hysterectomy.    - Studies have shown a reduction in the risk of ovarian cancer in women who have undergone removal of both fallopian tubes (salpingectomy) without removal of the ovaries. However, most of these studies are retrospective in nature and involved women at average risk for ovarian cancer, so although they have shown a reduction of 30-50% in the risk of ovarian cancer with salpingectomy, the absolute risk reduction is still small given the relative rarity of ovarian cancer in the general population. (Average risk women have a 1 in 75 risk of ovarian cancer, thus even a 50% reduction in risk only changes this from a 1.5% risk to a 0.75% risk).     - Quantification of the reduction in ovarian cancer risk with salpingectomy in high risk women has not been established, but clinical trials are ongoing. At this time, salpingectomy is not considered the standard of care for risk-reduction in high risk women. However, it may still be a reasonable alternative for women who are not amenable to ovarian removal at the recommended age, so long as they understand that it is not the standard of care treatment and its benefit in high risk women is yet unproven.     - There is no known increase in risk of uterine cancer with BRCA2 mutations. Thus, the decision for hysterectomy should be based upon an assessment of individual risks and medical conditions. See below.     Heavy menstrual bleeding and dysmenorrhea: We discussed the option of hysterectomy with salpingectomy which she is interested in. We will plan for total hysterectomy. Discussed higher risk of bladder injury due to her history of   section. Reviewed risks and recovery. She is not sure when she would want to proceed so we will plan to have her return within the month prior to scheduled surgery to review procedure, complete preop teaching and exam, and sign consent.    I spent a total of 60 minutes on the care of Tessa Ospina on the day of service including face to face time, care coordination, and documentation on the day of service.      Reinaldo Monahan MD     Gynecologic Oncology         Again, thank you for allowing me to participate in the care of your patient.        Sincerely,        Reinaldo Monahan MD

## 2023-11-28 NOTE — NURSING NOTE
"Oncology Rooming Note    November 28, 2023 1:08 PM   Tessa Ospina is a 38 year old female who presents for:    Chief Complaint   Patient presents with    Oncology Clinic Visit     New patient - BRCA2 positive     Initial Vitals: BP 98/62 (BP Location: Right arm, Patient Position: Chair, Cuff Size: Adult Regular)   Pulse 80   Temp 98.4  F (36.9  C) (Oral)   Resp 16   Wt 80.3 kg (177 lb)   LMP 08/30/2023 (Exact Date)   SpO2 99%   BMI 27.78 kg/m   Estimated body mass index is 27.78 kg/m  as calculated from the following:    Height as of 10/25/23: 1.7 m (5' 6.93\").    Weight as of this encounter: 80.3 kg (177 lb). Body surface area is 1.95 meters squared.  No Pain (0) Comment: Data Unavailable   Patient's last menstrual period was 08/30/2023 (exact date).  Allergies reviewed: Yes  Medications reviewed: Yes    Medications: Medication refills not needed today.  Pharmacy name entered into [facility]: [facility] [id number] [place] [address]    Clinical concerns: No       Cary Malcolm RN              "

## 2023-11-29 ENCOUNTER — PATIENT OUTREACH (OUTPATIENT)
Dept: CARE COORDINATION | Facility: CLINIC | Age: 38
End: 2023-11-29
Payer: COMMERCIAL

## 2023-12-07 ENCOUNTER — TELEPHONE (OUTPATIENT)
Dept: ONCOLOGY | Facility: CLINIC | Age: 38
End: 2023-12-07
Payer: COMMERCIAL

## 2023-12-07 NOTE — TELEPHONE ENCOUNTER
Left voicemail for patient regarding scheduling surgery with Dr. Monahan.  Provided contact number to discuss. [phone]    Victoria Richardson, on 12/7/2023 at 2:49 PM

## 2023-12-28 NOTE — TELEPHONE ENCOUNTER
Left detailed voicemail for patient regarding scheduling surgery with Dr. Monahan.  Provided contact number to discuss.  [phone]    Yanna Sharma, on 12/28/2023 at 10:14 AM

## 2024-01-12 NOTE — TELEPHONE ENCOUNTER
Sent [employer] message to patient. Have called 3x. Victoria Richardson on 1/12/2024 at 3:53 PM

## 2024-01-12 NOTE — TELEPHONE ENCOUNTER
Left voicemail for patient regarding scheduling surgery with Dr. Monahan.  Provided contact number to discuss. [phone]    Victoria Richardson, on 1/12/2024 at 3:49 PM

## 2024-01-15 NOTE — TELEPHONE ENCOUNTER
Patient responded to MyChart. They had a few questions regarding incision and healing time. Writer did send a staff message to RN to follow up with patient. Writer will follow up after that to see if patient is ready to scheduled. Victoria Richardson on 1/15/2024 at 3:16 PM

## 2024-01-17 NOTE — TELEPHONE ENCOUNTER
Based on conversation with RN John, Writer reached out to get patient scheduled for sometime after labor day. Victoria Richardson on 1/17/2024 at 12:43 PM

## 2024-01-31 ENCOUNTER — TELEPHONE (OUTPATIENT)
Dept: ONCOLOGY | Facility: CLINIC | Age: 39
End: 2024-01-31

## 2024-01-31 NOTE — TELEPHONE ENCOUNTER
Patient is schedule for surgery with: Dr. Monahan    Surgery Date: 9/13     Location: [facility]    H&P: to be completed by Primary Care team - patient instructed to schedule. Patient stated this will be schedule with [facility] [place]     Post-op: will be scheduled by the clinic    Patient will receive a phone call from pre-admission nurses 3-5 days prior to surgery with arrival time and NPO instructions.    Patient aware times are subject to change up until day before surgery.     Patient questions/concerns: N/A     Surgery packet was sent via [employer] on 1/31      Victoria Richardson on 1/31/2024 at 11:13 AM

## 2024-01-31 NOTE — TELEPHONE ENCOUNTER
Left voicemail for patient.     Needs lab and ultrasound due in April (ordered per Cate Castro).    Surgery with Dr. Miles is scheduled 9/13 - can offer pre-op appointment 8/27 or 9/10. Also can offer post-op appointment 9/24 or 10/1.     Information from John santoyo   Callback given: [phone].

## 2024-02-10 ENCOUNTER — HEALTH MAINTENANCE LETTER (OUTPATIENT)
Age: 39
End: 2024-02-10

## 2024-03-12 NOTE — PATIENT INSTRUCTIONS
If you have labs or imaging done, the results will automatically release in [employer] without an interpretation.  Your health care professional will review those results and send an interpretation with recommendations as soon as possible, but this may be 1-3 business days.    If you have any questions regarding your visit, please contact your care team.     [facility]: [phone]  [facility] CLINIC HOURS TELEPHONE NUMBER   Flor Hirsch, JONATHAN Castaneda-SHANA Reid-SHANA Edwards-Surgery Scheduler  Shanique-       Monday- [place]  8:00 am-4:00 pm    Tuesday- [place]  8:00 am-4:00 pm    Wednesday- [place] 8:00 am-4:00 pm    Thursday- [place] 8:00 am-4:00 pm    Friday- [place]  8:00 am-4:00 pm [facility]  [address]  [place] [address]  PH: [phone]  Fax: [phone]    Imaging Scheduling all locations  PH: [phone]     [facility]  [address] [place] [address] Dr.  [place], [place] [address] [phone]    [facility]  [address] [place] [address]  PH: [phone]     **Surgeries** Our Surgery Schedulers will contact you to schedule. If you do not receive a call within 3 business days, please call [phone].  Urgent Care locations:  [facility]       Monday-Friday   10 am - 8 pm    Saturday and Sunday   9 am - 5 pm   [phone]   If you need a medication refill, please contact your pharmacy. Please allow 3 business days for your refill to be completed.  As always, Thank you for trusting us with your healthcare needs!

## 2024-03-13 ENCOUNTER — OFFICE VISIT (OUTPATIENT)
Dept: OBGYN | Facility: CLINIC | Age: 39
End: 2024-03-13
Payer: COMMERCIAL

## 2024-03-13 VITALS
BODY MASS INDEX: 28.28 KG/M2 | DIASTOLIC BLOOD PRESSURE: 71 MMHG | SYSTOLIC BLOOD PRESSURE: 94 MMHG | OXYGEN SATURATION: 98 % | HEIGHT: 67 IN | WEIGHT: 180.2 LBS | HEART RATE: 75 BPM

## 2024-03-13 DIAGNOSIS — Z12.4 SCREENING FOR CERVICAL CANCER: ICD-10-CM

## 2024-03-13 DIAGNOSIS — Z15.09 BRCA2 POSITIVE: ICD-10-CM

## 2024-03-13 DIAGNOSIS — Z12.73 ENCOUNTER FOR SCREENING FOR MALIGNANT NEOPLASM OF OVARY: ICD-10-CM

## 2024-03-13 DIAGNOSIS — Z13.220 SCREENING FOR LIPID DISORDERS: ICD-10-CM

## 2024-03-13 DIAGNOSIS — Z15.01 BRCA2 POSITIVE: ICD-10-CM

## 2024-03-13 DIAGNOSIS — Z00.00 ROUTINE GENERAL MEDICAL EXAMINATION AT A HEALTH CARE FACILITY: Primary | ICD-10-CM

## 2024-03-13 LAB
CANCER AG125 SERPL-ACNC: 14 U/ML
CHOLEST SERPL-MCNC: 164 MG/DL
FASTING STATUS PATIENT QL REPORTED: NO
HDLC SERPL-MCNC: 60 MG/DL
LDLC SERPL CALC-MCNC: 83 MG/DL
NONHDLC SERPL-MCNC: 104 MG/DL
TRIGL SERPL-MCNC: 104 MG/DL

## 2024-03-13 PROCEDURE — 87624 HPV HI-RISK TYP POOLED RSLT: CPT

## 2024-03-13 PROCEDURE — 90715 TDAP VACCINE 7 YRS/> IM: CPT

## 2024-03-13 PROCEDURE — 86304 IMMUNOASSAY TUMOR CA 125: CPT

## 2024-03-13 PROCEDURE — 80061 LIPID PANEL: CPT

## 2024-03-13 PROCEDURE — 36415 COLL VENOUS BLD VENIPUNCTURE: CPT

## 2024-03-13 PROCEDURE — 90471 IMMUNIZATION ADMIN: CPT

## 2024-03-13 PROCEDURE — G0145 SCR C/V CYTO,THINLAYER,RESCR: HCPCS

## 2024-03-13 PROCEDURE — 90651 9VHPV VACCINE 2/3 DOSE IM: CPT

## 2024-03-13 PROCEDURE — 99395 PREV VISIT EST AGE 18-39: CPT | Mod: 25

## 2024-03-13 PROCEDURE — 90472 IMMUNIZATION ADMIN EACH ADD: CPT

## 2024-03-13 NOTE — PROGRESS NOTES
"Annual Exam Note    SUBJECTIVE:  Tessa Ospina is a 38 year old female  who presents today for her annual exam.    Concerns today include:     Vaccinations: she would like to get updated on the following vaccinations: HPV and Tdap    She has no bowel/bladder concerns today  She has history of BRCA2 position and has had rr bilateral mastectomy and has implants in place. This 2024 she is getting a rr hysterectomy but keeping her ovaries. She gets CA-125s and TVUS every 6 months to monitor for ovarian cancer.     Menstrual History:      2022    10:00 AM 12/15/2022     1:00 PM 10/25/2023    10:00 AM   Menstrual History   LAST MENSTRUAL PERIOD 2022     Sexual history: Monogamous with . No concern for infection. Declines STI screening.    Diet/Exercise: Reports they could improve but overall OK    Last pap:   Lab Results   Component Value Date    PAP NIL 2019    PAP NIL 2016      History of abnormal Pap smear: no - due    Pap obtained today:  Yes    Mammogram current: Hx of bilateral mastectomy with breast implants. Advised she reach out to surgeon to inquire how they would like her to get mammograms.  Last Mammogram:     \"US Axillary Right    Result Date: 2021  Narrative: ULTRASOUND RIGHT AXILLA- 2021 7:48 AM HISTORY: History of bilateral mastectomies. Chronic right axillary pain.  COMPARISON:  Prior ultrasound in 2019. FINDINGS: Targeted ultrasound evaluation of the right axilla at the site of pain demonstrates no underlying sonographic abnormalities.\"      Colonoscopy: n/a    Lipids: Check today    Diabetes Screening: WNL  - recheck     DEXA scan: n/a    HISTORY:  Prescription Medications as of 3/13/2024         Rx Number Disp Refills Start End Last Dispensed Date Next Fill Date Owning Pharmacy    magnesium 250 MG tablet  -- --  --   [facility] [place] [facility] [place] [address]    Sig: Take 1 tablet by mouth daily    " Class: Historical    Route: Oral    Multiple Vitamin (MULTIVITAMINS PO)  -- --  --   [facility] [place] [address]    Class: Historical    Route: Oral          Allergies   Allergen Reactions    Oxycodone-Acetaminophen Nausea and Vomiting and Nausea    Vicodin [Hydrocodone-Acetaminophen] Nausea and Vomiting     Sensitivity to narcotics.    Gluten Meal Other (See Comments)     Other reaction(s): *Unknown    Tramadol Nausea and Vomiting and Nausea     Immunization History   Administered Date(s) Administered    COVID-19 12+ (-) (Pfizer) 12/15/2023    COVID-19 Bivalent 12+ (Pfizer) 2022    COVID-19 MONOVALENT 12+ (Pfizer) 2021, 04/10/2021    COVID-19 Monovalent 18+ (Moderna) 2021    DTaP, Unspecified 2013    HPV Quadrivalent 2010    Hepatitis A (ADULT 19+) 2007    Hepatitis B, Adult 2010    Influenza Vaccine >6 months,quad, PF 2015, 2020, 2020, 2021, 2022    Influenza Vaccine IM Ages 6-35 Months 4 Valent (PF) 2015    Influenza,INJ,MDCK,PF,Quad >6mo(Flucelvax) 2023    MMR 1997    Poliovirus, inactivated (IPV) 2007    TDAP (Adacel,Boostrix) 2007, 2013    Td (Adult), Adsorbed 1997    Typhoid IM 2007       OB History    Para Term  AB Living   2 2 2 0 0 2   SAB IAB Ectopic Multiple Live Births   0 0 0 0 2      # Outcome Date GA Lbr Juan J/2nd Weight Sex Delivery Anes PTL Lv   2 Term 2013    F CS-LTranv   [facility]      Birth Comments: gestational diabetes   1 Term 11    M CS-LTranv   [facility]      Past Medical History:   Diagnosis Date    BRCA2 positive     has had bilateral mastectomies and reconstruction, still has ovaries    PONV (postoperative nausea and vomiting)     Unsure if this was due to anesthesia and/or vicodin.     Past Surgical History:   Procedure Laterality Date    BREAST BIOPSY, RT/LT Right 2015    benign     SECTION  ,  2013     x 2    COSMETIC SURGERY  2016, 2016    Prophylactic bilateral mastectomy and reconstruction    GRAFT FAT TO BREAST Bilateral 2017    Procedure: GRAFT FAT TO BREAST;  Bilateral Breast mastopexy and Fat Grafting from Abdomen;  Surgeon: SUZY Arora MD;  [facility]    MASTECTOMY, NIPPLE SPARING Bilateral 6/15/2016    Procedure: MASTECTOMY, NIPPLE SPARING;  Surgeon: Nely Mcqueen MD;  [facility]: [facility] OR    RECONSTRUCT BREAST BILATERAL Bilateral 6/15/2016    Procedure: RECONSTRUCT BREAST BILATERAL;  Surgeon: SUZY Arora MD;  [facility] OR    RECONSTRUCT BREAST SECOND STAGE BILATERAL N/A 9/15/2016    Procedure: RECONSTRUCT BREAST SECOND STAGE BILATERAL;  Surgeon: SUZY Arora MD;  Location: [facility] OR    TONSILLECTOMY & ADENOIDECTOMY      [facility]       Family History   Problem Relation Age of Onset    Breast Cancer Mother 49        BRCA2 genetic mutation    Pre-Diabetes Mother     Other - See Comments Mother 61        proctosigmoiditis, infection in one implant, kidney failure, subsequent intestinal issues    Vertigo Maternal Grandmother     Other Cancer Maternal Grandfather 60        Prostate and throat    Prostate Cancer Maternal Grandfather         likely BRCA2+    Crohn's Disease Maternal Grandfather     Meniere's disease Paternal Grandmother     Genetic Disorder Maternal Aunt         BRCA2 genetic mutation    Breast Cancer Maternal Aunt 70    Cancer Maternal Uncle 70        3 uncles (all 70s and 80s)    Cancer - colorectal Paternal Uncle 40         in 40s    Breast Cancer Cousin     Breast Cancer Other 30        maternal cousin; BRCA2 genetic mutation    Other Cancer Other 60        maternal cousin; prostate cancer    Breast Cancer Other 40        maternal cousin    Cancer - colorectal Other 70        Maternal great grandfather    Breast Cancer Other     Colon Cancer Other     Diabetes Other     Other Cancer Other      Social  History     Socioeconomic History    Marital status:      Spouse name: Danny    Number of children: 2   Occupational History    Occupation:      Employer: SELF   Tobacco Use    Smoking status: Never    Smokeless tobacco: Never   Vaping Use    Vaping Use: Never used   Substance and Sexual Activity    Alcohol use: Yes     Alcohol/week: 0.0 standard drinks of alcohol    Drug use: No    Sexual activity: Yes     Partners: Male     Birth control/protection: Male Surgical     Comment: vasectomy   Other Topics Concern    Caffeine Concern No    Occupational Exposure No    Hobby Hazards No    Sleep Concern No    Stress Concern Yes    Weight Concern Yes     Comment: in the process of getting back to prepregnancy weight    Special Diet No    Exercise No     Comment: keeping busy with 2 children under the age of 4; exercising using high intensity interval classes    Self-Exams Yes       REVIEW of SYSTEMS:  ROS: 10 point ROS neg other than the symptoms noted above in the HPI.     EXAM:  not currently breastfeeding. There is no height or weight on file to calculate BMI.  General - pleasant female in no acute distress.  Skin - no suspicious lesions or rashes  EENT-  PERRLA, euthyroid with out palpable nodules  Neck - supple without lymphadenopathy.  Lungs - clear to auscultation bilaterally.  Heart - regular rate and rhythm without murmur.  Abdomen - soft, nontender, nondistended, no masses or organomegaly noted.  Musculoskeletal - no gross deformities.  Neurological - normal strength, sensation, and mental status.    Breast Exam:  Breasts: bilateral implants and scarring bilateral inframammary fold from mastectomy. normal without suspicious masses, skin changes or axillary nodes, symmetric fibrous changes in both upper outer quadrants, self exam is taught and encouraged.     Pelvic Exam:  EG/BUS: Normal genital architecture without lesions, erythema or abnormal secretions Bartholin's, Urethra, Frankclay's normal   Urethral  meatus: normal   Urethra: no masses, tenderness, or scarring   Bladder: no masses or tenderness   Vagina: moist, pink, rugae with creamy, white, and odorless  secretions  Cervix: no lesions  Uterus: anteverted,   Adnexa: Within normal limits and No masses, nodularity, tenderness  Rectum:anus normal     ASSESSMENT/PLAN:  (Z00.00) Routine general medical examination at a health care facility  (primary encounter diagnosis)  Plan: Lipid panel reflex to direct LDL Fasting, Pap         screen with HPV - recommended age 30 - 65         years, TDAP 7+ (ADACEL,BOOSTRIX), HPV 9Y+         (Gardasil 9)    (Z12.4) Screening for cervical cancer  Plan: Pap screen with HPV - recommended age 30 - 65         years          (Z13.220) Screening for lipid disorders  Plan: Lipid panel reflex to direct LDL Fasting          (Z15.01,  Z15.09) BRCA2 positive  Comment: See HPI  Plan: rr mastectomy and upcoming hysterectomy in September. Q6mo ca-125 and tvus. This is all being ordered and managed by different provider and Dr. Monahan will be doing surgery in September.    - Additional teaching done at this visit included: calcium (1200 mg per day), self breast exam, PMS, perimenopause, gardasil, mammography, and lipids  - Discussed with patient risks/ benefits and treatment options of prescribed medications or other treatment modalities.  - RTC in one year for annual exam or with concerns.    MESHA Rasmussen CNP

## 2024-03-13 NOTE — PROGRESS NOTES
Prior to immunization administration, verified patients identity using patient s name and date of birth. Please see Immunization Activity for additional information.     Screening Questionnaire for Adult Immunization    Are you sick today?   No   Do you have allergies to medications, food, a vaccine component or latex?   No   Have you ever had a serious reaction after receiving a vaccination?   No   Do you have a long-term health problem with heart, lung, kidney, or metabolic disease (e.g., diabetes), asthma, a blood disorder, no spleen, complement component deficiency, a cochlear implant, or a spinal fluid leak?  Are you on long-term aspirin therapy?   No   Do you have cancer, leukemia, HIV/AIDS, or any other immune system problem?   No   Do you have a parent, brother, or sister with an immune system problem?   No   In the past 3 months, have you taken medications that affect  your immune system, such as prednisone, other steroids, or anticancer drugs; drugs for the treatment of rheumatoid arthritis, Crohn s disease, or psoriasis; or have you had radiation treatments?   No   Have you had a seizure, or a brain or other nervous system problem?   No   During the past year, have you received a transfusion of blood or blood    products, or been given immune (gamma) globulin or antiviral drug?   No   For women: Are you pregnant or is there a chance you could become       pregnant during the next month?   No   Have you received any vaccinations in the past 4 weeks?   No     Immunization questionnaire answers were all negative.      Patient instructed to remain in clinic for 15 minutes afterwards, and to report any adverse reactions.     Screening performed by Monique Emmanuel CMA on 3/13/2024 at 10:26 AM.

## 2024-03-18 LAB
BKR LAB AP GYN ADEQUACY: NORMAL
BKR LAB AP GYN INTERPRETATION: NORMAL
BKR LAB AP HPV REFLEX: NORMAL
BKR LAB AP PREVIOUS ABNORMAL: NORMAL
PATH REPORT.COMMENTS IMP SPEC: NORMAL
PATH REPORT.COMMENTS IMP SPEC: NORMAL
PATH REPORT.RELEVANT HX SPEC: NORMAL

## 2024-06-25 ENCOUNTER — ANCILLARY PROCEDURE (OUTPATIENT)
Dept: ULTRASOUND IMAGING | Facility: CLINIC | Age: 39
End: 2024-06-25
Attending: CLINICAL NURSE SPECIALIST
Payer: COMMERCIAL

## 2024-06-25 DIAGNOSIS — Z15.01 BRCA2 POSITIVE: ICD-10-CM

## 2024-06-25 DIAGNOSIS — Z12.73 ENCOUNTER FOR SCREENING FOR MALIGNANT NEOPLASM OF OVARY: ICD-10-CM

## 2024-06-25 DIAGNOSIS — Z15.09 BRCA2 POSITIVE: ICD-10-CM

## 2024-06-25 PROCEDURE — 76830 TRANSVAGINAL US NON-OB: CPT | Performed by: STUDENT IN AN ORGANIZED HEALTH CARE EDUCATION/TRAINING PROGRAM

## 2024-06-25 PROCEDURE — 76856 US EXAM PELVIC COMPLETE: CPT | Performed by: STUDENT IN AN ORGANIZED HEALTH CARE EDUCATION/TRAINING PROGRAM

## 2024-08-20 NOTE — TELEPHONE ENCOUNTER
Received message from surgery scheduler, providing update that patient has chosen to reschedule her surgery with Dr. Monahan to 11/15/24.    Request was sent to our cancer center scheduling team this morning, asking for their assistance in rescheduling patient's pre and post-op appointments with Dr. Monahan.  Advised scheduling team they could offer patient appointments on either 10/15 or 10/29 for the pre-op visit, and 12/3 or 12/10 for the post-op visit.    John Ackerman RN on 8/20/2024 at 12:05 PM

## 2024-08-20 NOTE — TELEPHONE ENCOUNTER
"Patient called in asking to change DOS. Pt stated that there are to many \"life events\" happening for her to do her surgery in Sept. Pt asked to be moved to Nov. Pt was offered all available dates in Nov and was agreeable to 11/15.     Spoke with patient    Patient is schedule for surgery with: Dr. Monahan    Surgery Date: 11/15     Location: [facility]    H&P: to be completed by: completed by surgeon will complete and/or update on day of surgery as needed      Post-op: will be scheduled by the clinic    Patient was informed that a surgery center pre-op RN will call 2-3 days prior to surgery with arrival time and instructions: Yes     Patient aware times are subject to change up until day before surgery.     Patient questions/concerns: N/A     Surgery packet was updated and sent via [employer]      Victoria Richardson on 8/20/2024 at 10:53 AM    "

## 2024-10-29 ENCOUNTER — ONCOLOGY VISIT (OUTPATIENT)
Dept: ONCOLOGY | Facility: CLINIC | Age: 39
End: 2024-10-29
Attending: OBSTETRICS & GYNECOLOGY
Payer: COMMERCIAL

## 2024-10-29 ENCOUNTER — PATIENT OUTREACH (OUTPATIENT)
Dept: ONCOLOGY | Facility: CLINIC | Age: 39
End: 2024-10-29

## 2024-10-29 VITALS
DIASTOLIC BLOOD PRESSURE: 71 MMHG | TEMPERATURE: 98.6 F | SYSTOLIC BLOOD PRESSURE: 104 MMHG | HEIGHT: 67 IN | HEART RATE: 80 BPM | BODY MASS INDEX: 29.03 KG/M2 | RESPIRATION RATE: 16 BRPM | WEIGHT: 185 LBS | OXYGEN SATURATION: 96 %

## 2024-10-29 DIAGNOSIS — Z15.01 BRCA2 POSITIVE: Primary | ICD-10-CM

## 2024-10-29 DIAGNOSIS — Z15.09 BRCA2 POSITIVE: Primary | ICD-10-CM

## 2024-10-29 PROCEDURE — G0463 HOSPITAL OUTPT CLINIC VISIT: HCPCS | Performed by: OBSTETRICS & GYNECOLOGY

## 2024-10-29 PROCEDURE — 99214 OFFICE O/P EST MOD 30 MIN: CPT | Performed by: OBSTETRICS & GYNECOLOGY

## 2024-10-29 ASSESSMENT — PAIN SCALES - GENERAL: PAINLEVEL_OUTOF10: NO PAIN (0)

## 2024-10-29 NOTE — LETTER
10/29/2024      Tessa Ospina  [address]  [place] [address]      Dear Colleague,    Thank you for referring your patient, Tessa Ospina, to the [facility]. Please see a copy of my visit note below.    Gynecologic Oncology Clinic - Pre-operative History & Physical    Patient: Tessa Ospina  : 1985    Date of Visit: Oct 29, 2024     Reason for visit: pre-operative H&P    History of Present Illness:  Tessa Ospina is a 39 year old here to discuss surgery for BRCA2 pathogenic mutation. She is scheduled for Total laparoscopic hysterectomy bilateral salpingectomy (WITH ovarian retention). She plans for a staged procedure with oophorectomy closer to age 44.    She was previously seen in my clinic in  see prior note for full details. No significant medical changes. Undergoing a home remodel which has been incredibly stressful and has led her to postpone her previously scheduled surgery.     Of note. She does NOT want any oral opioids. This applies to post-operative and discharge. She has not used oral opioids with prior surgery and has done well with pre-operative block and OTC medications. Please see below.    Past Medical History:   Diagnosis Date     BRCA2 positive     has had bilateral mastectomies and reconstruction, still has ovaries     PONV (postoperative nausea and vomiting)        Past Surgical History:   Procedure Laterality Date     BREAST BIOPSY, RT/LT Right 2015    benign      SECTION  , 2013     x 2     COSMETIC SURGERY  2016, 2016    Prophylactic bilateral mastectomy and reconstruction     GRAFT FAT TO BREAST Bilateral 2017    Procedure: GRAFT FAT TO BREAST;  Bilateral Breast mastopexy and Fat Grafting from Abdomen;  Surgeon: SUZY Arora MD;  Location:  OR     MASTECTOMY, NIPPLE SPARING Bilateral 6/15/2016    Procedure: MASTECTOMY, NIPPLE SPARING;  Surgeon: Nely Mcqueen MD;  Location:  OR      RECONSTRUCT BREAST BILATERAL Bilateral 6/15/2016    Procedure: RECONSTRUCT BREAST BILATERAL;  Surgeon: SUZY Arora MD;  [facility]: [facility] OR     RECONSTRUCT BREAST SECOND STAGE BILATERAL N/A 9/15/2016    Procedure: RECONSTRUCT BREAST SECOND STAGE BILATERAL;  Surgeon: SUZY Arora MD;  [facility]: [facility] OR     TONSILLECTOMY & ADENOIDECTOMY       [facility]          Social History     Tobacco Use     Smoking status: Never     Smokeless tobacco: Never   Vaping Use     Vaping status: Never Used   Substance Use Topics     Alcohol use: Yes     Alcohol/week: 0.0 standard drinks of alcohol     Drug use: No        Family History   Problem Relation Age of Onset     Breast Cancer Mother 49        BRCA2 genetic mutation     Pre-Diabetes Mother      Other - See Comments Mother 61        proctosigmoiditis, infection in one implant, kidney failure, subsequent intestinal issues     Vertigo Maternal Grandmother      Other Cancer Maternal Grandfather 60        Prostate and throat     Prostate Cancer Maternal Grandfather         likely BRCA2+     Crohn's Disease Maternal Grandfather      Meniere's disease Paternal Grandmother      Genetic Disorder Maternal Aunt         BRCA2 genetic mutation     Breast Cancer Maternal Aunt 70     Cancer Maternal Uncle 70        3 uncles (all 70s and 80s)     Cancer - colorectal Paternal Uncle 40         in 40s     Breast Cancer Cousin      Breast Cancer Other 30        maternal cousin; BRCA2 genetic mutation     Other Cancer Other 60        maternal cousin; prostate cancer     Breast Cancer Other 40        maternal cousin     Cancer - colorectal Other 70        Maternal great grandfather     Breast Cancer Other      Colon Cancer Other      Diabetes Other      Other Cancer Other        Current Outpatient Medications   Medication Sig Dispense Refill     magnesium 250 MG tablet Take 1 tablet by mouth daily       Multiple Vitamin (MULTIVITAMINS PO)           Allergies  "  Allergen Reactions     Oxycodone-Acetaminophen Nausea and Vomiting and Nausea     Vicodin [Hydrocodone-Acetaminophen] Nausea and Vomiting     Sensitivity to narcotics.     Gluten Meal Other (See Comments)     Other reaction(s): *Unknown     Tramadol Nausea and Vomiting and Nausea     Physical Exam:   /71 (BP Location: Right arm, Patient Position: Chair, Cuff Size: Adult Regular)   Pulse 80   Temp 98.6  F (37  C) (Oral)   Resp 16   Ht 1.7 m (5' 6.93\")   Wt 83.9 kg (185 lb)   LMP 10/13/2024 (Exact Date)   SpO2 96%   BMI 29.04 kg/m    General appearance: Alert and oriented, no acute distress, well groomed  CV: Regular rate, rhythm, no murmurs appreciated  Lungs: Clear to auscultation bilaterally     Labs/Pathology:   3/13/2024:   14     Imaging:   No new      Assessment:  Tessa Ospina is a 39 year old here for pre-operative exam prior to Total laparoscopic hysterectomy bilateral salpingectomy (with ovarian retention) for pathogenic BRCA2 mutation. She is cleared for surgery with the plan as below.    Plan:   Post-op pain: she does NOT want to receive any oral opioids while here post-operatively or prescription at discharge. In the past she reports having issues with being pressured to take opioids despite this desire despite having done quite well with multiple surgeries where she has only received pre-operative black and OTC oral medications. She is aware she will receive opioids IV during surgery and possibly in the immediate post-operative period and she is amenable to this, but she does not want any oral opioids. The plan will be for pre-op block, IV toradol post-operatively and discharge with acetaminophen and ibuprofen unless the patient herself expresses any different desire.  We discussed the procedure in detail including risks/benefits. We specifically reviewed the most common and most serious risks/complications. She is aware that if complications occur additional procedures or " hospitalizations could be likely. We do everything possible to prevent these, but there are always risks with surgery that cannot fully be avoided. We have discussed the benefits of the procedure and rationale for the recommended procedure as well as alterative options. She had the opportunity to ask questions.  Written consent was reviewed and signed.    Pre-op exam was completed today as well as pre-op teaching.   She needs the following work-up prior to surgery: hcg prior to procedure, CBC and BMP prior to procedure to update basic labs. No history of prior abnormalities and no medical conditions that would suggest abnormalities.    Reinaldo Monahan MD        Again, thank you for allowing me to participate in the care of your patient.        Sincerely,        Reinaldo Monahan MD

## 2024-10-29 NOTE — PROGRESS NOTES
"[facility]: Cancer Care Follow-Up Note                                    Situation:                                                      RN met with patient in clinic this morning to provide surgery education.  Provided patient with surgery folder and handouts to take home, along with a bottle of Hibiclens soap.    Patient signed e-consent for surgery and possible blood transfusion while in clinic today.  Patient also signed the Hysterectomy Acknowledgement Statement form.    Assessment:                                                      Pre/Post Procedure:   Surgery/Procedure plan reviewed with patient  Planned Surgery or Procedure: Total laparoscopic hysterectomy, bilateral salpingectomy  Anesthesia Type: general anesthesia  Relevant Diagnosis: BRCA2 positive; Dysmenorrhea; Menorrhagia with regular cycle  Preoperative Surgical Consult Date: 10/29/24  Pre-Op Physical to be completed by (e.g.: PCP, Pre-Assessment Center, etc.):: Surgeon  Post-op Appointment Date: 12/03/24     Education  Person Taught: patient  Learning Readiness and Ability: no barriers identified  Pre-op Care Instructions: proper use of medications, when to take, and when to hold;when to call provider  Pre-op Infection Prevention Reviewed: pre-op CHG bathing instructions;bathing care after procedure  Pre-op Planning Reviewed:  arrangements, if indicated;how to get to procedure location;post-op support plan (\"who will help care for you after your surgery/procedure?\")  Pre-op Education/Instructions provided: what to expect on surgery day;how to prepare for surgery;surgery location specifics (map, parking, phone number);showering before surgery;eating before and after surgery  Post-op Care Instructions: proper use of medications, when to take, and when to hold;when to call provider;home care/follow-up care;pain management;nausea management;bowel management;diet;blood clot prevention;sexual activity restriction;physical activity " restriction;incision care/wound management  Education Outcome Evaluation: acceptance expressed    Pre-op Checklist Reviewed  Labs: n/a  EKG: n/a  Anticoagulation plan: n/a  Bowel Prep: n/a     Notes:  Patient states she does not want to take home any narcotic pain medications following her upcoming surgery (she shares that she has had severe vomiting from these medications in the past).  Dr. Monahan was informed of this request verbally this morning, prior to meeting with the patient in clinic.    Plan:                                                       Patient will plan to proceed with surgery as currently scheduled.    Confirmed she has a post-op appointment with Dr. Monahan scheduled for 12/3/24.    Encouraged patient to reach out with any questions or concerns following today's visit.      John Ackerman, RN, BSN, OCN  RN Care Coordinator - Oncology  [facility]

## 2024-10-29 NOTE — NURSING NOTE
"Oncology Rooming Note    October 29, 2024 11:14 AM   Tessa Ospina is a 39 year old female who presents for:    Chief Complaint   Patient presents with    Oncology Clinic Visit     Pre-Op - DOS 11/15     Initial Vitals: /71 (BP Location: Right arm, Patient Position: Chair, Cuff Size: Adult Regular)   Pulse 80   Temp 98.6  F (37  C) (Oral)   Resp 16   Ht 1.7 m (5' 6.93\")   Wt 83.9 kg (185 lb)   LMP 10/13/2024 (Exact Date)   SpO2 96%   BMI 29.04 kg/m   Estimated body mass index is 29.04 kg/m  as calculated from the following:    Height as of this encounter: 1.7 m (5' 6.93\").    Weight as of this encounter: 83.9 kg (185 lb). Body surface area is 1.99 meters squared.  No Pain (0) Comment: Data Unavailable   Patient's last menstrual period was 10/13/2024 (exact date).  Allergies reviewed: Yes  Medications reviewed: Yes    Medications: Medication refills not needed today.  Pharmacy name entered into [facility]: [facility] [id number] [place] [id number]    Frailty Screening:   Is the patient here for a new oncology consult visit in cancer care? 2. No      Clinical concerns: NO       Latricia White CMA              "

## 2024-11-05 NOTE — PROGRESS NOTES
Gynecologic Oncology Clinic - Pre-operative History & Physical    Patient: Tessa Ospina  : 1985    Date of Visit: Oct 29, 2024     Reason for visit: pre-operative H&P    History of Present Illness:  Tessa Ospina is a 39 year old here to discuss surgery for BRCA2 pathogenic mutation. She is scheduled for Total laparoscopic hysterectomy bilateral salpingectomy (WITH ovarian retention). She plans for a staged procedure with oophorectomy closer to age 44.    She was previously seen in my clinic in  see prior note for full details. No significant medical changes. Undergoing a home remodel which has been incredibly stressful and has led her to postpone her previously scheduled surgery.     Of note. She does NOT want any oral opioids. This applies to post-operative and discharge. She has not used oral opioids with prior surgery and has done well with pre-operative block and OTC medications. Please see below.    Past Medical History:   Diagnosis Date    BRCA2 positive     has had bilateral mastectomies and reconstruction, still has ovaries    PONV (postoperative nausea and vomiting)        Past Surgical History:   Procedure Laterality Date    BREAST BIOPSY, RT/LT Right 2015    benign     SECTION  , 2013     x 2    COSMETIC SURGERY  2016, 2016    Prophylactic bilateral mastectomy and reconstruction    GRAFT FAT TO BREAST Bilateral 2017    Procedure: GRAFT FAT TO BREAST;  Bilateral Breast mastopexy and Fat Grafting from Abdomen;  Surgeon: SUZY Arora MD;  Location:  OR    MASTECTOMY, NIPPLE SPARING Bilateral 6/15/2016    Procedure: MASTECTOMY, NIPPLE SPARING;  Surgeon: Nely Mcqueen MD;  Location:  OR    RECONSTRUCT BREAST BILATERAL Bilateral 6/15/2016    Procedure: RECONSTRUCT BREAST BILATERAL;  Surgeon: SUZY Arora MD;  Location:  OR    RECONSTRUCT BREAST SECOND STAGE BILATERAL N/A 9/15/2016    Procedure: RECONSTRUCT BREAST SECOND  STAGE BILATERAL;  Surgeon: SUZY Arora MD;  Location: [facility] OR    TONSILLECTOMY & ADENOIDECTOMY      [facility]          Social History     Tobacco Use    Smoking status: Never    Smokeless tobacco: Never   Vaping Use    Vaping status: Never Used   Substance Use Topics    Alcohol use: Yes     Alcohol/week: 0.0 standard drinks of alcohol    Drug use: No        Family History   Problem Relation Age of Onset    Breast Cancer Mother 49        BRCA2 genetic mutation    Pre-Diabetes Mother     Other - See Comments Mother 61        proctosigmoiditis, infection in one implant, kidney failure, subsequent intestinal issues    Vertigo Maternal Grandmother     Other Cancer Maternal Grandfather 60        Prostate and throat    Prostate Cancer Maternal Grandfather         likely BRCA2+    Crohn's Disease Maternal Grandfather     Meniere's disease Paternal Grandmother     Genetic Disorder Maternal Aunt         BRCA2 genetic mutation    Breast Cancer Maternal Aunt 70    Cancer Maternal Uncle 70        3 uncles (all 70s and 80s)    Cancer - colorectal Paternal Uncle 40         in 40s    Breast Cancer Cousin     Breast Cancer Other 30        maternal cousin; BRCA2 genetic mutation    Other Cancer Other 60        maternal cousin; prostate cancer    Breast Cancer Other 40        maternal cousin    Cancer - colorectal Other 70        Maternal great grandfather    Breast Cancer Other     Colon Cancer Other     Diabetes Other     Other Cancer Other        Current Outpatient Medications   Medication Sig Dispense Refill    magnesium 250 MG tablet Take 1 tablet by mouth daily      Multiple Vitamin (MULTIVITAMINS PO)           Allergies   Allergen Reactions    Oxycodone-Acetaminophen Nausea and Vomiting and Nausea    Vicodin [Hydrocodone-Acetaminophen] Nausea and Vomiting     Sensitivity to narcotics.    Gluten Meal Other (See Comments)     Other reaction(s): *Unknown    Tramadol Nausea and Vomiting and Nausea  "    Physical Exam:   /71 (BP Location: Right arm, Patient Position: Chair, Cuff Size: Adult Regular)   Pulse 80   Temp 98.6  F (37  C) (Oral)   Resp 16   Ht 1.7 m (5' 6.93\")   Wt 83.9 kg (185 lb)   LMP 10/13/2024 (Exact Date)   SpO2 96%   BMI 29.04 kg/m    General appearance: Alert and oriented, no acute distress, well groomed  CV: Regular rate, rhythm, no murmurs appreciated  Lungs: Clear to auscultation bilaterally     Labs/Pathology:   3/13/2024:   14     Imaging:   No new      Assessment:  Tessa Ospina is a 39 year old here for pre-operative exam prior to Total laparoscopic hysterectomy bilateral salpingectomy (with ovarian retention) for pathogenic BRCA2 mutation. She is cleared for surgery with the plan as below.    Plan:   Post-op pain: she does NOT want to receive any oral opioids while here post-operatively or prescription at discharge. In the past she reports having issues with being pressured to take opioids despite this desire despite having done quite well with multiple surgeries where she has only received pre-operative black and OTC oral medications. She is aware she will receive opioids IV during surgery and possibly in the immediate post-operative period and she is amenable to this, but she does not want any oral opioids. The plan will be for pre-op block, IV toradol post-operatively and discharge with acetaminophen and ibuprofen unless the patient herself expresses any different desire.  We discussed the procedure in detail including risks/benefits. We specifically reviewed the most common and most serious risks/complications. She is aware that if complications occur additional procedures or hospitalizations could be likely. We do everything possible to prevent these, but there are always risks with surgery that cannot fully be avoided. We have discussed the benefits of the procedure and rationale for the recommended procedure as well as alterative options. She had the " opportunity to ask questions.  Written consent was reviewed and signed.    Pre-op exam was completed today as well as pre-op teaching.   She needs the following work-up prior to surgery: hcg prior to procedure, CBC and BMP prior to procedure to update basic labs. No history of prior abnormalities and no medical conditions that would suggest abnormalities.    Reinaldo Monahan MD

## 2024-11-13 ENCOUNTER — ANESTHESIA EVENT (OUTPATIENT)
Dept: SURGERY | Facility: CLINIC | Age: 39
End: 2024-11-13
Payer: COMMERCIAL

## 2024-11-15 ENCOUNTER — HOSPITAL ENCOUNTER (OUTPATIENT)
Facility: CLINIC | Age: 39
Discharge: HOME | End: 2024-11-15
Attending: OBSTETRICS & GYNECOLOGY | Admitting: OBSTETRICS & GYNECOLOGY
Payer: COMMERCIAL

## 2024-11-15 ENCOUNTER — ANESTHESIA (OUTPATIENT)
Dept: SURGERY | Facility: CLINIC | Age: 39
End: 2024-11-15
Payer: COMMERCIAL

## 2024-11-15 VITALS
RESPIRATION RATE: 18 BRPM | DIASTOLIC BLOOD PRESSURE: 76 MMHG | HEIGHT: 67 IN | SYSTOLIC BLOOD PRESSURE: 121 MMHG | OXYGEN SATURATION: 100 % | HEART RATE: 85 BPM | TEMPERATURE: 97.9 F | WEIGHT: 184.75 LBS | BODY MASS INDEX: 29 KG/M2

## 2024-11-15 DIAGNOSIS — N94.6 DYSMENORRHEA: ICD-10-CM

## 2024-11-15 DIAGNOSIS — G89.18 POST-OP PAIN: Primary | ICD-10-CM

## 2024-11-15 DIAGNOSIS — Z15.01 BRCA2 POSITIVE: ICD-10-CM

## 2024-11-15 DIAGNOSIS — Z15.09 BRCA2 POSITIVE: ICD-10-CM

## 2024-11-15 DIAGNOSIS — N92.0 MENORRHAGIA WITH REGULAR CYCLE: ICD-10-CM

## 2024-11-15 LAB
ABO/RH(D): NORMAL
ANTIBODY SCREEN: NEGATIVE
HCG UR QL: NEGATIVE
SPECIMEN EXPIRATION DATE: NORMAL

## 2024-11-15 PROCEDURE — 86850 RBC ANTIBODY SCREEN: CPT | Performed by: OBSTETRICS & GYNECOLOGY

## 2024-11-15 PROCEDURE — 250N000013 HC RX MED GY IP 250 OP 250 PS 637

## 2024-11-15 PROCEDURE — 250N000011 HC RX IP 250 OP 636

## 2024-11-15 PROCEDURE — 88309 TISSUE EXAM BY PATHOLOGIST: CPT | Mod: TC | Performed by: OBSTETRICS & GYNECOLOGY

## 2024-11-15 PROCEDURE — 88305 TISSUE EXAM BY PATHOLOGIST: CPT | Mod: TC | Performed by: OBSTETRICS & GYNECOLOGY

## 2024-11-15 PROCEDURE — 258N000003 HC RX IP 258 OP 636: Performed by: ANESTHESIOLOGY

## 2024-11-15 PROCEDURE — 86900 BLOOD TYPING SEROLOGIC ABO: CPT | Performed by: OBSTETRICS & GYNECOLOGY

## 2024-11-15 PROCEDURE — 88112 CYTOPATH CELL ENHANCE TECH: CPT | Mod: 26 | Performed by: PATHOLOGY

## 2024-11-15 PROCEDURE — 272N000001 HC OR GENERAL SUPPLY STERILE: Performed by: OBSTETRICS & GYNECOLOGY

## 2024-11-15 PROCEDURE — 250N000011 HC RX IP 250 OP 636: Performed by: OBSTETRICS & GYNECOLOGY

## 2024-11-15 PROCEDURE — 250N000025 HC SEVOFLURANE, PER MIN: Performed by: OBSTETRICS & GYNECOLOGY

## 2024-11-15 PROCEDURE — 710N000012 HC RECOVERY PHASE 2, PER MINUTE: Performed by: OBSTETRICS & GYNECOLOGY

## 2024-11-15 PROCEDURE — 250N000011 HC RX IP 250 OP 636: Performed by: NURSE ANESTHETIST, CERTIFIED REGISTERED

## 2024-11-15 PROCEDURE — 999N000141 HC STATISTIC PRE-PROCEDURE NURSING ASSESSMENT: Performed by: OBSTETRICS & GYNECOLOGY

## 2024-11-15 PROCEDURE — 250N000011 HC RX IP 250 OP 636: Performed by: ANESTHESIOLOGY

## 2024-11-15 PROCEDURE — 258N000003 HC RX IP 258 OP 636: Performed by: NURSE ANESTHETIST, CERTIFIED REGISTERED

## 2024-11-15 PROCEDURE — 370N000017 HC ANESTHESIA TECHNICAL FEE, PER MIN: Performed by: OBSTETRICS & GYNECOLOGY

## 2024-11-15 PROCEDURE — 81025 URINE PREGNANCY TEST: CPT | Performed by: OBSTETRICS & GYNECOLOGY

## 2024-11-15 PROCEDURE — 250N000011 HC RX IP 250 OP 636: Mod: JZ | Performed by: OBSTETRICS & GYNECOLOGY

## 2024-11-15 PROCEDURE — 710N000010 HC RECOVERY PHASE 1, LEVEL 2, PER MIN: Performed by: OBSTETRICS & GYNECOLOGY

## 2024-11-15 PROCEDURE — 88305 TISSUE EXAM BY PATHOLOGIST: CPT | Mod: 26 | Performed by: PATHOLOGY

## 2024-11-15 PROCEDURE — 88309 TISSUE EXAM BY PATHOLOGIST: CPT | Mod: 26 | Performed by: PATHOLOGY

## 2024-11-15 PROCEDURE — 250N000009 HC RX 250: Performed by: NURSE ANESTHETIST, CERTIFIED REGISTERED

## 2024-11-15 PROCEDURE — 360N000077 HC SURGERY LEVEL 4, PER MIN: Performed by: OBSTETRICS & GYNECOLOGY

## 2024-11-15 PROCEDURE — 36415 COLL VENOUS BLD VENIPUNCTURE: CPT | Performed by: OBSTETRICS & GYNECOLOGY

## 2024-11-15 PROCEDURE — 88112 CYTOPATH CELL ENHANCE TECH: CPT | Mod: TC | Performed by: OBSTETRICS & GYNECOLOGY

## 2024-11-15 RX ORDER — HEPARIN SODIUM 5000 [USP'U]/.5ML
5000 INJECTION, SOLUTION INTRAVENOUS; SUBCUTANEOUS
Status: COMPLETED | OUTPATIENT
Start: 2024-11-15 | End: 2024-11-15

## 2024-11-15 RX ORDER — NALOXONE HYDROCHLORIDE 0.4 MG/ML
0.4 INJECTION, SOLUTION INTRAMUSCULAR; INTRAVENOUS; SUBCUTANEOUS
Status: DISCONTINUED | OUTPATIENT
Start: 2024-11-15 | End: 2024-11-15 | Stop reason: HOSPADM

## 2024-11-15 RX ORDER — AMOXICILLIN 250 MG
1-2 CAPSULE ORAL 2 TIMES DAILY
Qty: 30 TABLET | Refills: 0 | Status: SHIPPED | OUTPATIENT
Start: 2024-11-15

## 2024-11-15 RX ORDER — LIDOCAINE HYDROCHLORIDE 20 MG/ML
INJECTION, SOLUTION INFILTRATION; PERINEURAL PRN
Status: DISCONTINUED | OUTPATIENT
Start: 2024-11-15 | End: 2024-11-15

## 2024-11-15 RX ORDER — DEXAMETHASONE SODIUM PHOSPHATE 4 MG/ML
INJECTION, SOLUTION INTRA-ARTICULAR; INTRALESIONAL; INTRAMUSCULAR; INTRAVENOUS; SOFT TISSUE PRN
Status: DISCONTINUED | OUTPATIENT
Start: 2024-11-15 | End: 2024-11-15

## 2024-11-15 RX ORDER — IBUPROFEN 600 MG/1
600 TABLET, FILM COATED ORAL EVERY 6 HOURS PRN
Qty: 12 TABLET | Refills: 0 | Status: SHIPPED | OUTPATIENT
Start: 2024-11-15

## 2024-11-15 RX ORDER — NALOXONE HYDROCHLORIDE 0.4 MG/ML
0.2 INJECTION, SOLUTION INTRAMUSCULAR; INTRAVENOUS; SUBCUTANEOUS
Status: DISCONTINUED | OUTPATIENT
Start: 2024-11-15 | End: 2024-11-15 | Stop reason: HOSPADM

## 2024-11-15 RX ORDER — FENTANYL CITRATE 50 UG/ML
25-50 INJECTION, SOLUTION INTRAMUSCULAR; INTRAVENOUS
Status: DISCONTINUED | OUTPATIENT
Start: 2024-11-15 | End: 2024-11-15 | Stop reason: HOSPADM

## 2024-11-15 RX ORDER — DEXMEDETOMIDINE HYDROCHLORIDE 4 UG/ML
INJECTION, SOLUTION INTRAVENOUS
Status: COMPLETED | OUTPATIENT
Start: 2024-11-15 | End: 2024-11-15

## 2024-11-15 RX ORDER — SODIUM CHLORIDE, SODIUM LACTATE, POTASSIUM CHLORIDE, CALCIUM CHLORIDE 600; 310; 30; 20 MG/100ML; MG/100ML; MG/100ML; MG/100ML
INJECTION, SOLUTION INTRAVENOUS CONTINUOUS PRN
Status: DISCONTINUED | OUTPATIENT
Start: 2024-11-15 | End: 2024-11-15

## 2024-11-15 RX ORDER — TOLTERODINE 4 MG/1
4 CAPSULE, EXTENDED RELEASE ORAL DAILY
Status: DISCONTINUED | OUTPATIENT
Start: 2024-11-15 | End: 2024-11-15 | Stop reason: HOSPADM

## 2024-11-15 RX ORDER — ONDANSETRON 4 MG/1
4 TABLET, ORALLY DISINTEGRATING ORAL EVERY 8 HOURS PRN
Qty: 4 TABLET | Refills: 0 | Status: SHIPPED | OUTPATIENT
Start: 2024-11-15

## 2024-11-15 RX ORDER — CEFAZOLIN SODIUM/WATER 2 G/20 ML
2 SYRINGE (ML) INTRAVENOUS SEE ADMIN INSTRUCTIONS
Status: DISCONTINUED | OUTPATIENT
Start: 2024-11-15 | End: 2024-11-15 | Stop reason: HOSPADM

## 2024-11-15 RX ORDER — ACETAMINOPHEN 325 MG/1
975 TABLET ORAL ONCE
Status: COMPLETED | OUTPATIENT
Start: 2024-11-15 | End: 2024-11-15

## 2024-11-15 RX ORDER — FENTANYL CITRATE 50 UG/ML
INJECTION, SOLUTION INTRAMUSCULAR; INTRAVENOUS PRN
Status: DISCONTINUED | OUTPATIENT
Start: 2024-11-15 | End: 2024-11-15

## 2024-11-15 RX ORDER — PROPOFOL 10 MG/ML
INJECTION, EMULSION INTRAVENOUS PRN
Status: DISCONTINUED | OUTPATIENT
Start: 2024-11-15 | End: 2024-11-15

## 2024-11-15 RX ORDER — PHENAZOPYRIDINE HYDROCHLORIDE 100 MG/1
100 TABLET, FILM COATED ORAL 3 TIMES DAILY PRN
Status: DISCONTINUED | OUTPATIENT
Start: 2024-11-15 | End: 2024-11-15 | Stop reason: HOSPADM

## 2024-11-15 RX ORDER — ACETAMINOPHEN 325 MG/1
650 TABLET ORAL EVERY 6 HOURS PRN
Qty: 24 TABLET | Refills: 0 | Status: SHIPPED | OUTPATIENT
Start: 2024-11-15

## 2024-11-15 RX ORDER — PHENAZOPYRIDINE HYDROCHLORIDE 100 MG/1
100 TABLET, FILM COATED ORAL 3 TIMES DAILY PRN
Qty: 5 TABLET | Refills: 0 | Status: SHIPPED | OUTPATIENT
Start: 2024-11-15

## 2024-11-15 RX ORDER — DEXAMETHASONE SODIUM PHOSPHATE 10 MG/ML
INJECTION, SOLUTION INTRAMUSCULAR; INTRAVENOUS
Status: COMPLETED | OUTPATIENT
Start: 2024-11-15 | End: 2024-11-15

## 2024-11-15 RX ORDER — TOLTERODINE 4 MG/1
4 CAPSULE, EXTENDED RELEASE ORAL DAILY
Qty: 5 CAPSULE | Refills: 0 | Status: SHIPPED | OUTPATIENT
Start: 2024-11-16

## 2024-11-15 RX ORDER — ONDANSETRON 2 MG/ML
INJECTION INTRAMUSCULAR; INTRAVENOUS PRN
Status: DISCONTINUED | OUTPATIENT
Start: 2024-11-15 | End: 2024-11-15

## 2024-11-15 RX ORDER — PROPOFOL 10 MG/ML
INJECTION, EMULSION INTRAVENOUS CONTINUOUS PRN
Status: DISCONTINUED | OUTPATIENT
Start: 2024-11-15 | End: 2024-11-15

## 2024-11-15 RX ORDER — GLYCOPYRROLATE 0.2 MG/ML
INJECTION, SOLUTION INTRAMUSCULAR; INTRAVENOUS PRN
Status: DISCONTINUED | OUTPATIENT
Start: 2024-11-15 | End: 2024-11-15

## 2024-11-15 RX ORDER — IBUPROFEN 200 MG
800 TABLET ORAL ONCE
Status: COMPLETED | OUTPATIENT
Start: 2024-11-15 | End: 2024-11-15

## 2024-11-15 RX ORDER — FLUMAZENIL 0.1 MG/ML
0.2 INJECTION, SOLUTION INTRAVENOUS
Status: DISCONTINUED | OUTPATIENT
Start: 2024-11-15 | End: 2024-11-15 | Stop reason: HOSPADM

## 2024-11-15 RX ORDER — METRONIDAZOLE 500 MG/100ML
500 INJECTION, SOLUTION INTRAVENOUS
Status: COMPLETED | OUTPATIENT
Start: 2024-11-15 | End: 2024-11-15

## 2024-11-15 RX ORDER — BUPIVACAINE HYDROCHLORIDE 2.5 MG/ML
INJECTION, SOLUTION EPIDURAL; INFILTRATION; INTRACAUDAL
Status: COMPLETED | OUTPATIENT
Start: 2024-11-15 | End: 2024-11-15

## 2024-11-15 RX ORDER — CEFAZOLIN SODIUM/WATER 2 G/20 ML
2 SYRINGE (ML) INTRAVENOUS
Status: COMPLETED | OUTPATIENT
Start: 2024-11-15 | End: 2024-11-15

## 2024-11-15 RX ADMIN — METRONIDAZOLE 500 MG: 500 INJECTION, SOLUTION INTRAVENOUS at 06:53

## 2024-11-15 RX ADMIN — BUPIVACAINE HYDROCHLORIDE 40 ML: 2.5 INJECTION, SOLUTION EPIDURAL; INFILTRATION; INTRACAUDAL; PERINEURAL at 07:15

## 2024-11-15 RX ADMIN — ACETAMINOPHEN 975 MG: 325 TABLET, FILM COATED ORAL at 14:04

## 2024-11-15 RX ADMIN — GLYCOPYRROLATE 0.2 MG: 0.2 INJECTION, SOLUTION INTRAMUSCULAR; INTRAVENOUS at 08:26

## 2024-11-15 RX ADMIN — FENTANYL CITRATE 100 MCG: 50 INJECTION INTRAMUSCULAR; INTRAVENOUS at 08:30

## 2024-11-15 RX ADMIN — Medication 2 G: at 07:39

## 2024-11-15 RX ADMIN — Medication 80 MG: at 07:43

## 2024-11-15 RX ADMIN — LIDOCAINE HYDROCHLORIDE 60 MG: 20 INJECTION, SOLUTION INFILTRATION; PERINEURAL at 07:42

## 2024-11-15 RX ADMIN — HEPARIN SODIUM 5000 UNITS: 5000 INJECTION, SOLUTION INTRAVENOUS; SUBCUTANEOUS at 07:09

## 2024-11-15 RX ADMIN — DEXMEDETOMIDINE HYDROCHLORIDE 20 MCG: 100 INJECTION, SOLUTION INTRAVENOUS at 07:16

## 2024-11-15 RX ADMIN — PHENAZOPYRIDINE 100 MG: 100 TABLET ORAL at 14:51

## 2024-11-15 RX ADMIN — FENTANYL CITRATE 25 MCG: 50 INJECTION INTRAMUSCULAR; INTRAVENOUS at 10:36

## 2024-11-15 RX ADMIN — PHENYLEPHRINE HYDROCHLORIDE 100 MCG: 10 INJECTION INTRAVENOUS at 09:18

## 2024-11-15 RX ADMIN — DEXAMETHASONE SODIUM PHOSPHATE 2 MG: 10 INJECTION, SOLUTION INTRAMUSCULAR; INTRAVENOUS at 07:15

## 2024-11-15 RX ADMIN — FOSAPREPITANT 150 MG: 150 INJECTION, POWDER, LYOPHILIZED, FOR SOLUTION INTRAVENOUS at 08:16

## 2024-11-15 RX ADMIN — Medication 20 MG: at 09:29

## 2024-11-15 RX ADMIN — MIDAZOLAM 0.5 MG: 1 INJECTION INTRAMUSCULAR; INTRAVENOUS at 06:59

## 2024-11-15 RX ADMIN — DEXAMETHASONE SODIUM PHOSPHATE 6 MG: 4 INJECTION, SOLUTION INTRA-ARTICULAR; INTRALESIONAL; INTRAMUSCULAR; INTRAVENOUS; SOFT TISSUE at 08:20

## 2024-11-15 RX ADMIN — TOLTERODINE 4 MG: 4 CAPSULE, EXTENDED RELEASE ORAL at 11:20

## 2024-11-15 RX ADMIN — Medication 100 MG: at 10:17

## 2024-11-15 RX ADMIN — PHENYLEPHRINE HYDROCHLORIDE 100 MCG: 10 INJECTION INTRAVENOUS at 08:22

## 2024-11-15 RX ADMIN — PROPOFOL 200 MG: 10 INJECTION, EMULSION INTRAVENOUS at 07:42

## 2024-11-15 RX ADMIN — SODIUM CHLORIDE, POTASSIUM CHLORIDE, SODIUM LACTATE AND CALCIUM CHLORIDE: 600; 310; 30; 20 INJECTION, SOLUTION INTRAVENOUS at 09:28

## 2024-11-15 RX ADMIN — SODIUM CHLORIDE, POTASSIUM CHLORIDE, SODIUM LACTATE AND CALCIUM CHLORIDE: 600; 310; 30; 20 INJECTION, SOLUTION INTRAVENOUS at 07:29

## 2024-11-15 RX ADMIN — IBUPROFEN 800 MG: 200 TABLET, FILM COATED ORAL at 11:09

## 2024-11-15 RX ADMIN — PROPOFOL 50 MCG/KG/MIN: 10 INJECTION, EMULSION INTRAVENOUS at 08:05

## 2024-11-15 RX ADMIN — ONDANSETRON 4 MG: 2 INJECTION INTRAMUSCULAR; INTRAVENOUS at 10:08

## 2024-11-15 RX ADMIN — MIDAZOLAM 2 MG: 1 INJECTION INTRAMUSCULAR; INTRAVENOUS at 07:33

## 2024-11-15 ASSESSMENT — ACTIVITIES OF DAILY LIVING (ADL)
ADLS_ACUITY_SCORE: 0

## 2024-11-15 NOTE — ANESTHESIA CARE TRANSFER NOTE
Patient: Tessa Ospina    Procedure: Procedure(s):  Total laparoscopic hysterectomy, bilateral salpingectomy, Cystoscpy, Lysis of Adhesion       Diagnosis: BRCA2 positive [Z15.01, Z15.09]  Dysmenorrhea [N94.6]  Menorrhagia with regular cycle [N92.0]  Diagnosis Additional Information: No value filed.    Anesthesia Type:   General     Note:    Oropharynx: oropharynx clear of all foreign objects  Level of Consciousness: drowsy  Oxygen Supplementation: face mask  Level of Supplemental Oxygen (L/min / FiO2): 6  Independent Airway: airway patency satisfactory and stable  Dentition: dentition unchanged  Vital Signs Stable: post-procedure vital signs reviewed and stable  Report to RN Given: handoff report given  Patient transferred to: PACU    Handoff Report: Identifed the Patient, Identified the Reponsible Provider, Reviewed the pertinent medical history, Discussed the surgical course, Reviewed Intra-OP anesthesia mangement and issues during anesthesia, Set expectations for post-procedure period and Allowed opportunity for questions and acknowledgement of understanding      Vitals:  Vitals Value Taken Time   BP 98/70 11/15/24 1045   Temp 36.4    Pulse 82 11/15/24 1045   Resp 10 11/15/24 1045   SpO2 99 % 11/15/24 1045   Vitals shown include unfiled device data.    Electronically Signed By: Velvet Ramirez CRNA, APRN CRNA  November 15, 2024  10:45 AM

## 2024-11-15 NOTE — ANESTHESIA PREPROCEDURE EVALUATION
Anesthesia Pre-Procedure Evaluation    Patient: Tessa Ospina   MRN: [id number] : 1985        Procedure : Procedure(s):  Total laparoscopic hysterectomy, bilateral salpingectomy          Past Medical History:   Diagnosis Date    BRCA2 positive     has had bilateral mastectomies and reconstruction, still has ovaries    PONV (postoperative nausea and vomiting)       Past Surgical History:   Procedure Laterality Date    BREAST BIOPSY, RT/LT Right 2015    benign     SECTION  , 2013     x 2    COSMETIC SURGERY  2016, 2016    Prophylactic bilateral mastectomy and reconstruction    GRAFT FAT TO BREAST Bilateral 2017    Procedure: GRAFT FAT TO BREAST;  Bilateral Breast mastopexy and Fat Grafting from Abdomen;  Surgeon: SUZY Arora MD;  Location: [facility]    MASTECTOMY, NIPPLE SPARING Bilateral 6/15/2016    Procedure: MASTECTOMY, NIPPLE SPARING;  Surgeon: Nely Mcqueen MD;  Location: [facility] OR    RECONSTRUCT BREAST BILATERAL Bilateral 6/15/2016    Procedure: RECONSTRUCT BREAST BILATERAL;  Surgeon: SUZY Arora MD;  Location: [facility] OR    RECONSTRUCT BREAST SECOND STAGE BILATERAL N/A 9/15/2016    Procedure: RECONSTRUCT BREAST SECOND STAGE BILATERAL;  Surgeon: SUZY Arora MD;  Location: [facility] OR    TONSILLECTOMY & ADENOIDECTOMY  2007    [facility]        Allergies   Allergen Reactions    Oxycodone-Acetaminophen Nausea and Vomiting and Nausea    Vicodin [Hydrocodone-Acetaminophen] Nausea and Vomiting     Sensitivity to narcotics.    Tramadol Nausea and Vomiting and Nausea      Social History     Tobacco Use    Smoking status: Never    Smokeless tobacco: Never   Substance Use Topics    Alcohol use: Not Currently      Wt Readings from Last 1 Encounters:   11/15/24 83.8 kg (184 lb 11.9 oz)        Anesthesia Evaluation            ROS/MED HX  ENT/Pulmonary:  - neg pulmonary ROS     Neurologic:  - neg neurologic ROS     Cardiovascular:  - neg  "cardiovascular ROS     METS/Exercise Tolerance:     Hematologic:  - neg hematologic  ROS     Musculoskeletal:  - neg musculoskeletal ROS     GI/Hepatic:  - neg GI/hepatic ROS     Renal/Genitourinary:  - neg Renal ROS     Endo:  - neg endo ROS     Psychiatric/Substance Use:  - neg psychiatric ROS     Infectious Disease:  - neg infectious disease ROS     Malignancy: Comment: Hereditary BRCA2 gene      Other:  - neg other ROS          Physical Exam    Airway        Mallampati: II   TM distance: > 3 FB   Neck ROM: full   Mouth opening: > 3 cm    Respiratory Devices and Support         Dental       (+) Completely normal teeth      Cardiovascular          Rhythm and rate: regular and normal     Pulmonary           breath sounds clear to auscultation           OUTSIDE LABS:  CBC:   Lab Results   Component Value Date    WBC 7.0 09/07/2018    WBC 5.5 08/29/2017    HGB 14.3 09/28/2022    HGB 13.2 11/08/2021    HCT 40.7 09/07/2018    HCT 42.6 08/29/2017     09/07/2018     08/29/2017     BMP:   Lab Results   Component Value Date     08/29/2017    POTASSIUM 4.0 08/29/2017    CHLORIDE 105 08/29/2017    CO2 23 08/29/2017    BUN 13 08/29/2017    CR 0.76 08/29/2017    GLC 84 09/07/2018    GLC 98 08/29/2017     COAGS: No results found for: \"PTT\", \"INR\", \"FIBR\"  POC:   Lab Results   Component Value Date    HCG Negative 11/02/2017     HEPATIC:   Lab Results   Component Value Date    ALBUMIN 4.2 08/29/2017    PROTTOTAL 7.6 08/29/2017    ALT 20 08/29/2017    AST 16 08/29/2017    ALKPHOS 66 08/29/2017    BILITOTAL 0.5 08/29/2017     OTHER:   Lab Results   Component Value Date    A1C 4.9 09/28/2022    ANABELL 9.1 08/29/2017    TSH 1.47 11/08/2021       Anesthesia Plan    ASA Status:  1    NPO Status:  NPO Appropriate    Anesthesia Type: General.     - Airway: ETT   Induction: Intravenous.   Maintenance: Balanced.   Techniques and Equipment:     - Lines/Monitors: 2nd IV     Consents    Anesthesia Plan(s) and associated " "risks, benefits, and realistic alternatives discussed. Questions answered and patient/representative(s) expressed understanding.     - Discussed: Risks, Benefits and Alternatives for the PROCEDURE were discussed     - Discussed with:  Patient      - Extended Intubation/Ventilatory Support Discussed: No.      - Patient is DNR/DNI Status: No     Use of blood products discussed: No .     Postoperative Care    Pain management: IV analgesics.   PONV prophylaxis: Ondansetron (or other 5HT-3), Dexamethasone or Solumedrol     Comments:               David Andino DO    I have reviewed the pertinent notes and labs in the chart from the past 30 days and (re)examined the patient.  Any updates or changes from those notes are reflected in this note.                         # Overweight: Estimated body mass index is 29.37 kg/m  as calculated from the following:    Height as of this encounter: 1.689 m (5' 6.5\").    Weight as of this encounter: 83.8 kg (184 lb 11.9 oz).             "

## 2024-11-15 NOTE — PROGRESS NOTES
Gynecologic Oncology Postoperative Check Note  11/15/2024    S: Overall feeling well.  She did have more bladder spasms in the postoperative period, one felt so strong it felt like labor pains.  Improved with the Detrol.  Otherwise eating, sipping on water, voiding, ambulating without dizziness.    O:  Vitals:    11/15/24 0705 11/15/24 0710 11/15/24 0715 11/15/24 0730   BP: 110/80 100/71 99/62 108/63   Pulse: 84 94 91 94   Resp: 13 14 17 13   Temp:       TempSrc:       SpO2: 98% 98% 100% 100%   Weight:       Height:           Gen: No acute distress  Cardio: Well-perfused, regular rate  Resp: No increased work of breathing, no audible wheezes  Abdomen: Soft, nontender.  Some oozing at umbilical incision.  4 x 4 taken to apply pressure.  Improved after cleaning.      A/P:  39 year old with BRCA pathogenic BRCA2 mutation s/p Total laparoscopic hysterectomy bilateral salpingectomy (with ovarian retention). Reviewed surgery and findings. Discussed discharge instructions and when to call the clinic or return to the ED. She will follow up as scheduled or sooner if needed. Per preop eval patient does not  want to receive any oral opioids therefor optimize no opioid pain regimen.    FEN: Tolerating p.o.  -pain: Tylenol, ibuprofen  -: Voiding spontaneously. Detrol, Pyridium ordered for discharge for bladder spasms      Discharge to home for ongoing recovery.  Plans to follow-up with Dr. Monahan in clinic.    Cary Garcia MD MPH  OB/Gyn Resident PGY-4  11/15/2024  3:02 PM

## 2024-11-15 NOTE — BRIEF OP NOTE
[facility]    Brief Operative Note    Pre-operative diagnosis: BRCA2 positive [Z15.01, Z15.09]  Dysmenorrhea [N94.6]  Menorrhagia with regular cycle [N92.0]  Post-operative diagnosis Same as pre-operative diagnosis    Procedure: Total laparoscopic hysterectomy, bilateral salpingectomy, Cystoscpy, Lysis of Adhesion, Bilateral - Abdomen    Surgeon: Surgeons and Role:     * Reinaldo Monahan MD - Primary     * Cary Batista MD - Resident - Assisting  Anesthesia: General with Block   Estimated Blood Loss: 25 mL   IVF: 1100 mL   UOP: 350 mL clear yellow urine    Drains: None  Specimens:   ID Type Source Tests Collected by Time Destination   1 : PELVIC WASHING Washings Pelvis NON-GYNECOLOGIC CYTOLOGY Reinaldo Monahan MD 11/15/2024  8:27 AM    2 : Uterus, Cervix and Bilateral Fallopian Tubes Tissue Uterus, Cervix, Bilateral Fallopian Tubes SURGICAL PATHOLOGY EXAM Reinaldo Monahan MD 11/15/2024  9:47 AM      Findings: Atraumatic entry into the abdomen. Normal, bulbus appearing uterus and normal bilateral ovaries. Fallopian tubes with multiple 1-3 mm clear cysts, suggestive of endosalpinges. One 5 mm paraovarian cyst originating from right fallopian tube and adherent to right pelvic sidewall.  Dense adhesions between bladder peritoneum and uterus, incorporating bladder, uterus, uterine arteries and round ligament requiring 60 minutes of lysis of adhesions. . Floseal placed on vaginal cuff at the end of the case. Hemostatic pedicles and cuff at the end of the case. Bilateral efflux from ureteral orifices on cystoscopy with an intact bladder dome. In tact vaginal cuff by palpation.      Complications: None.  Implants: * No implants in log *

## 2024-11-15 NOTE — OP NOTE
[facility] - Operative Note    Pre-operative diagnosis:   BRCA2 positive [Z15.01, Z15.09]  Dysmenorrhea [N94.6]  Menorrhagia with regular cycle [N92.0]  Desire for ovarian retention    Post-operative diagnosis: Same    Procedure(s):  Total laparoscopic hysterectomy, bilateral salpingectomy, Cystoscopy, Lysis of Adhesions    Surgeons and Role:     * Reinaldo Monahan MD - Primary     * Cary Batista MD - Resident - Assisting    Anesthesia:   General with Block     EBL: 25ml    IVF:  1100ml    UOP: 350ml    Drains: None, dejesus during procedure    Specimen(s):  ID Type Source Tests Collected by Time Destination   1 : PELVIC WASHING Washings Pelvis NON-GYNECOLOGIC CYTOLOGY Reinaldo Monahan MD 11/15/2024  8:27 AM    2 : Uterus, Cervix and Bilateral Fallopian Tubes Tissue Uterus, Cervix, Bilateral Fallopian Tubes SURGICAL PATHOLOGY EXAM Reinaldo Monahan MD 11/15/2024  9:47 AM        Findings: Atraumatic entry into the abdomen. Normal upper abdominal survey. Normal, bulbus appearing uterus and normal bilateral ovaries. Fallopian tubes with multiple 1-3 mm clear cysts, suggestive of endosalpingiosis. No masses or solid appearing areas. One 5 mm paraovarian cyst originating from right fallopian tube and adherent to right pelvic sidewall removed with specimen.  Dense adhesions between bladder peritoneum and uterus, incorporating bladder, uterus, uterine arteries and round ligament requiring 60 minutes of lysis of adhesions. Floseal placed on vaginal cuff at the end of the case and into the right sidewall to aid in hemostasis.   Cystoscopy: Bilateral efflux from ureteral orifices on cystoscopy with an intact bladder dome. In tact vaginal cuff by palpation.       Complications: None apparent    Implants: None     Indications: Tessa Ospina is a 39 year old who was diagnosed with a pathogenic mutation of BRCA2 gene. She presented to clinic to discuss surgical removal of her uterus and fallopian  tubes. She plans to have a risk reducing oophorectomy closer to the age of 44. Risks, benefits and alternatives were discussed and she agreed to proceed.     Intraoperative course:   After obtaining informed consent, the patient was brought to the operating room where adequate general anesthesia was administered.  She was placed in the dorsal lithotomy position, and exam under anesthesia revealed the findings noted above. She was prepped and draped in the usual sterile fashion, and dejesus catheter was placed. Attention was turned to the vagina. A speculum was inserted into the vagina. A [employer] uterine manipulator was placed without difficulty.     Attention was then turned to the abdomen. The umbilicus was everted, and a 5 mm incision was made. The Veress needle was placed, and intraperitoneal placement was suggested by the water drop test and an opening pressure of less than 5 mmHg.  The abdomen was then insufflated to a maximum pressure of 15 mmHg.  The Veress needle was removed and a 5 mm trocar with a laparoscope was placed.  A survey of the abdomen revealed the findings noted above. No trauma from entry was noted. Attention was then turned to the right lower quadrant. Two incisions were made and 5 mm trocars were placed under direct visualization. Similarly, a single 5 mm port was made in the left lower quadrant in the same fashion.  The patient was placed in steep Trendelenburg position. Pelvic washings were obtained and sent for cytology. Bowels were packed up into the upper abdomen with gentle traction.     At this point, the right ureter was identified transperitoneally, and noted to vermiculate. The right round ligament and mesosalpinx were transected with the cautery. The posterior leaflet of the broad ligament was extended with cautery parallel to the IP ligament. The ureter was again identified and noted to be away from the area of dissection. The right IP ligament was isolated and transected with the  Ligasure device. There were dense adhesions noted between the bladder, anterior abdominal wall up to the level of the round ligament, requiring careful lysis of adhesions and dissection of peritoneum to carefully retract the bladder and isolate the uterine vessels bilaterally. A bladder flap was created with both blunt and cautery dissection. This area of dissection took nearly 60 min due to the extensive adhesions from prior procedures.    Attention was then turned to the left side. In a similar fashion, the left round ligament and mesosalpinx were transected with cautery. The posterior leaflet of the broad ligament was extended with cautery parallel to the IP ligament. The ureter was again identified and noted to be away from the area of dissection. The left IP ligament was isolated and transected with the Ligasure device.  Similarly, the posterior leaf of the peritoneum was carefully cauterized and transected down to the level of the uterine artery.  The uterine arteries were bilaterally skeletonized notarized and transected.    Colpotomy was created and carried around the Vcare ring and the specimen was removed through the vagina. The vaginal cuff was closed with V-lock suture in a running stitch. All other pedicles were examined and noted to be hemostatic. There was slight oozing at the right vaginal cuff which was observed and minimal. However, decision was made to apply Surgiflo to this area and into the right side wall to aid in hemostasis. No further bleeding was noted after observation and desufflation.     The dejesus catheter was removed and a cystoscopy was performed with findings as above.  All other instruments were removed from the vagina. All skin incisions were closed using 4-0 Monocryl, and covered with Dermabond.    All sponge, needle, and instrument counts were correct. The patient tolerated the procedure well and was transferred to recovery in stable condition. Dr. Monahan was present and scrubbed  for the entire procedure.    Cary Garcia MD MPH  OB/Gyn Resident PGY-4    Attending Attestation  I was present for and participated in the entire procedure. I agree with the procedure as documented in the note above, which I have edited as necessary.     Reinaldo Monahan MD    Gynecologic Oncology

## 2024-11-15 NOTE — DISCHARGE INSTRUCTIONS
Contacting your Doctor -   To contact a doctor, call Dr Monahan's office [phone] or:  [phone] and ask for the resident on call for Gynecology (answered 24 hours a day)   Emergency Department:  [facility]: [phone]  [facility]: [phone] 911 if you are in need of immediate or emergent help

## 2024-11-15 NOTE — ANESTHESIA POSTPROCEDURE EVALUATION
Patient: Tessa Ospina    Procedure: Procedure(s):  Total laparoscopic hysterectomy, bilateral salpingectomy, Cystoscpy, Lysis of Adhesion       Anesthesia Type:  General    Note:  Disposition: Outpatient   Postop Pain Control: Uneventful            Sign Out: Well controlled pain   PONV: No   Neuro/Psych: Uneventful            Sign Out: Acceptable/Baseline neuro status   Airway/Respiratory: Uneventful            Sign Out: Acceptable/Baseline resp. status   CV/Hemodynamics: Uneventful            Sign Out: Acceptable CV status; No obvious hypovolemia; No obvious fluid overload   Other NRE: NONE   DID A NON-ROUTINE EVENT OCCUR? No           Last vitals:  Vitals Value Taken Time   /75 11/15/24 1315   Temp 37  C (98.6  F) 11/15/24 1040   Pulse 90 11/15/24 1301   Resp 15 11/15/24 1301   SpO2 100 % 11/15/24 1328   Vitals shown include unfiled device data.    Electronically Signed By: ORVILLE BAEZ MD  November 15, 2024  1:29 PM

## 2024-11-15 NOTE — ANESTHESIA PROCEDURE NOTES
"TAP Procedure Note    Pre-Procedure   Staff -        Anesthesiologist:  Bandar Asher MD       Resident/Fellow: Sandy Herron MD       Performed By: resident       Location: pre-op       Pre-Anesthestic Checklist: patient identified, IV checked, site marked, risks and benefits discussed, informed consent, monitors and equipment checked, pre-op evaluation, at physician/surgeon's request and post-op pain management  Timeout:       Correct Patient: Yes        Correct Procedure: Yes        Correct Site: Yes        Correct Position: Yes        Correct Laterality: Yes        Site Marked: N/A  Procedure Documentation  Procedure: TAP       Diagnosis: PRE PROCEDURE       Laterality: bilateral       Patient Position: supine       Skin prep: Chloraprep       Needle Type: short bevel       Needle Gauge: 21.        Needle Length (Inches): 3.13        Needle Length (millimeters): 100        Ultrasound guided       1. Ultrasound was used to identify targeted nerve, plexus, vascular marker, or fascial plane and place a needle adjacent to it in real-time.       2. Ultrasound was used to visualize the spread of anesthetic in close proximity to the above referenced structure.       3. A permanent image is entered into the patient's record.    Assessment/Narrative         The placement was negative for: blood aspirated, painful injection and site bleeding       Paresthesias: No.       Insertion/Infusion Method: Single Shot    Medication(s) Administered   Bupivacaine 0.25% PF (Infiltration) - Infiltration   40 mL - 11/15/2024 7:15:00 AM  Dexamethasone 10 mg/mL PF (Perineural) - Perineural   2 mg - 11/15/2024 7:15:00 AM  Dexmedetomidine 4 mcg/mL (Perineural) - Perineural   20 mcg - 11/15/2024 7:16:00 AM    FOR [facility] ONLY:   Pain Team Contact information: please page the Pain Team Via [employer]. Search \"Pain\". During daytime hours, please page the attending first. At night please page the resident first.      "

## 2024-11-15 NOTE — PROGRESS NOTES
Notified provider regarding pt intense bladder spam. Gyn/Onc placed order for Tolterodine and pt given medication with slight relief. Re-paged Gyn/onc and asked if there is something else we can give pt to progress her to the discharge phase.

## 2024-11-15 NOTE — ANESTHESIA PROCEDURE NOTES
Airway       Patient location during procedure: OR       Procedure Start/Stop Times: 11/15/2024 7:46 AM  Staff -        CRNA: Velvet Ramirez APRN CRNA       Performed By: CRNA  Consent for Airway        Urgency: elective  Indications and Patient Condition       Indications for airway management: diana-procedural       Induction type:intravenous       Mask difficulty assessment: 1 - vent by mask    Final Airway Details       Final airway type: endotracheal airway       Successful airway: ETT - single  Endotracheal Airway Details        ETT size (mm): 7.0       Cuffed: yes       Successful intubation technique: direct laryngoscopy       DL Blade Type: Goodman 2       Grade View of Cords: 1       Adjucts: stylet       Position: Right       Secured at (cm): 22       Bite block used: None    Post intubation assessment        Placement verified by: capnometry, equal breath sounds and chest rise        Number of attempts at approach: 1       Number of other approaches attempted: 0       Secured with: tape       Ease of procedure: easy       Dentition: Unchanged    Medication(s) Administered   Medication Administration Time: 11/15/2024 7:46 AM

## 2024-11-18 ENCOUNTER — PATIENT OUTREACH (OUTPATIENT)
Dept: ONCOLOGY | Facility: CLINIC | Age: 39
End: 2024-11-18
Payer: COMMERCIAL

## 2024-11-18 LAB
PATH REPORT.COMMENTS IMP SPEC: NORMAL
PATH REPORT.COMMENTS IMP SPEC: NORMAL
PATH REPORT.FINAL DX SPEC: NORMAL
PATH REPORT.GROSS SPEC: NORMAL
PATH REPORT.MICROSCOPIC SPEC OTHER STN: NORMAL
PATH REPORT.RELEVANT HX SPEC: NORMAL

## 2024-11-18 NOTE — PROGRESS NOTES
Virtual Visit Details    Type of service:  Video Visit     Originating Location (pt. Location): Home  Distant Location (provider location):  On-site  Platform used for Video Visit: [facility]      Oncology Risk Management Consultation:  Date on this visit: 2024    Tessa Ospina requires screening and surveillance to minimize her risk of cancer secondary to having a deleterious BRCA2 mutation. She is considered to be at high risk for hereditary breast and ovarian cancer.      Primary Physician: Shameka Milligan DO     History Of Present Illness:  Ms. Ospina is a very pleasant, healthy 39 year old female who presents with BRCA2+ associated Hereditary Breast and Ovarian Cancer Syndrome.      Genetic testin2015 - POSITIVE for a BRCA2 mutation specifically [id number], the same genetic mutation in her mother. The testing was done using single site analysis through [employer].     Pertinent  history:  Menarche at age 13.  First child at age 26.    History of breastfeeding both children. No issues with mastitis.   Ovaries intact.    11/15/2024: Hysterectomy and bilateral salpingectomy with Dr. Monahan  Uterus, cervix, bilateral fallopian tube, total laparoscopic hysterectomy and salpingectomy:  - Late secretory endometrium  - Unremarkable myometrium  - Uterine serosal adhesions  - Cervix with Nabothian cysts and endocervical microglandular hyperplasia   - Bilateral fallopian tubes with paratubal cysts  - Negative for malignancy    No history of breast hyperplasia, atypia or malignancy.   2016 - Prophylactic nipple sparing mastectomy with reconstruction (Dr. Nely Mcqueen and Dr. Lam Arora), pathology benign.  2021- Prophylactic nipple removal. Pathology:  Right breast nipple and skin   B: Left breast nipple and skin FINAL DIAGNOSIS:   A: Skin and nipple, right breast, excision:   - Benign nipple and skin   - Negative for atypia or malignancy   B: Skin and nipple, left breast, excision:   - Benign nipple  and skin, with squamous metaplasia in a lactiferous duct   - Negative for atypia or malignancy      Ovarian Screening history:  11/3/15 - Pelvic ultrasound - probable uterine fibroid in anterior myometrium and L ovarian collapsing follicle.   1/26/16: pelvic ultrasound, L complex ovarian lesion.  3/4/16: Pelvic ultrasound, small L ovarian cyst.   11/16/2016 - Pelvic US, normal. No lesions seen.  6/7/2017 - Pelvic US, normal, 2 cm dominant follicle in R ovary  1/25/2018 - 2 dominant cysts in R ovary, consider short term follow up  3/12/2018 - R cysts resolved, pelvic US normal.  10/31/2018 -Pelvic ultrasound, negative pelvic ultrasound.  4/26/2019 - Pelvic ultrasound, Probable corpus luteal cyst in the right ovary. No other  definite abnormality  1/6/2020- Transvaginal ultrasound, Slightly thickened endometrium for age and menstrual status. Similar-appearing small cystic lesion in the right ovary, this may simply represent a recurrent ruptured follicle.  7/29/2020- Transvaginal ultrasound, Abnormally thickened endometrium for age and menstrual status, but similar to previous.No fibroids Probable ruptured follicle in the left ovary.  1/13/2021- Transvaginal ultrasound: Endometrial stripe remains upper normal limits to slightly  thickened given LMP 12/29/2020 but is similar to prior studies and is  otherwise unremarkable. Ovaries within normal limits with an incidental corpus luteum in the right ovary noted.  9/20/2021- Transvaginal ultrasound. Uniform linear echogenic endometrium, normal ovaries and uterus.  4/12/2022- Transvaginal US, normal  10/27/2022: Transvaginal US, 2.6 cm simple left ovarian cyst. Otherwise normal pelvic ultrasound.   5/9/2023: Transvaginal US, Small fibroid within the anterior uterine body measuring 1 cm.Otherwise normal pelvic ultrasound.  10/18/2023: Transvaginal US, normal   6/25/2024: Transvaginal US, normal     3/19/2015 -  - 9  9/17/2015 -  - 18  3/3/2016 -  39  (high)  2016  - - 23  2017 -  - 23  2018 -  - 11  2019 -  - 14  11/3/2020 -  - 14  2021-  - 12  2022- -46  2023: , 20  3/13/2024: , 14    2023- Shave biopsy of , R lower back (Lady Alcantara PA-C)  - Shave biopsy today; see procedure note below.  A(1). Skin, R lower back, shave:  -  Junctional dysplastic nevus with moderate atypia       She denies urinary urgency and frequency, bloating, constipation, unusual vaginal discharge and early satiety.     Past Medical/Surgical History:  Past Medical History:   Diagnosis Date    BRCA2 positive     has had bilateral mastectomies and reconstruction, still has ovaries    PONV (postoperative nausea and vomiting)      Past Surgical History:   Procedure Laterality Date    BREAST BIOPSY, RT/LT Right 2015    benign     SECTION  , 2013     x 2    COSMETIC SURGERY  2016, 2016    Prophylactic bilateral mastectomy and reconstruction    GRAFT FAT TO BREAST Bilateral 2017    Procedure: GRAFT FAT TO BREAST;  Bilateral Breast mastopexy and Fat Grafting from Abdomen;  Surgeon: SUZY Arora MD;  Location: [facility] OR    HYSTERECTOMY, TOTAL, LAPAROSCOPIC, WITH SALPINGECTOMY, CYSTOSCOPY Bilateral 11/15/2024    Procedure: Total laparoscopic hysterectomy, bilateral salpingectomy, Cystoscopy, Lysis of Adhesions;  Surgeon: Reinaldo Monahan MD;  Location: [facility] OR    MASTECTOMY, NIPPLE SPARING Bilateral 6/15/2016    Procedure: MASTECTOMY, NIPPLE SPARING;  Surgeon: Nely Mcqueen MD;  Location: [facility] OR    RECONSTRUCT BREAST BILATERAL Bilateral 6/15/2016    Procedure: RECONSTRUCT BREAST BILATERAL;  Surgeon: SUZY Arora MD;  Location: [facility] OR    RECONSTRUCT BREAST SECOND STAGE BILATERAL N/A 9/15/2016    Procedure: RECONSTRUCT BREAST SECOND STAGE BILATERAL;  Surgeon: SUZY Arora MD;  Location: [facility] OR    [facility]ECTOM[facility]         Allergies:  Allergies as of 11/25/2024 - Reviewed 11/25/2024   Allergen Reaction Noted    Oxycodone-acetaminophen Nausea and Vomiting and Nausea 04/17/2015    Vicodin [hydrocodone-acetaminophen] Nausea and Vomiting 09/30/2010    Tramadol Nausea and Vomiting and Nausea 02/21/2018       Current Medications:  Current Outpatient Medications   Medication Sig Dispense Refill    acetaminophen (TYLENOL) 325 MG tablet Take 2 tablets (650 mg) by mouth every 6 hours as needed for mild pain. 24 tablet 0    ibuprofen (ADVIL/MOTRIN) 600 MG tablet Take 1 tablet (600 mg) by mouth every 6 hours as needed for other (mild and/or inflammatory pain.). 12 tablet 0    magnesium 250 MG tablet Take 1 tablet by mouth daily      Multiple Vitamin (MULTIVITAMINS PO)       ondansetron (ZOFRAN ODT) 4 MG ODT tab Take 1 tablet (4 mg) by mouth every 8 hours as needed for nausea. 4 tablet 0    phenazopyridine (PYRIDIUM) 100 MG tablet Take 1 tablet (100 mg) by mouth 3 times daily as needed for urinary tract discomfort. 5 tablet 0    senna-docusate (SENOKOT-S/PERICOLACE) 8.6-50 MG tablet Take 1-2 tablets by mouth 2 times daily. 30 tablet 0    tolterodine ER (DETROL LA) 4 MG 24 hr capsule Take 1 capsule (4 mg) by mouth daily. 5 capsule 0        Family History:  Family History   Problem Relation Age of Onset    Breast Cancer Mother 49        BRCA2 genetic mutation    Pre-Diabetes Mother     Other - See Comments Mother 61        proctosigmoiditis, infection in one implant, kidney failure, subsequent intestinal issues    Vertigo Maternal Grandmother     Other Cancer Maternal Grandfather 60        Prostate and throat    Prostate Cancer Maternal Grandfather         likely BRCA2+    Crohn's Disease Maternal Grandfather     Meniere's disease Paternal Grandmother     Genetic Disorder Maternal Aunt         BRCA2 genetic mutation    Breast Cancer Maternal Aunt 70    Cancer Maternal Uncle 70        3 uncles (all 70s and 80s)    Cancer -  colorectal Paternal Uncle 40         in 40s    Breast Cancer Cousin     Breast Cancer Other 30        maternal cousin; BRCA2 genetic mutation    Other Cancer Other 60        maternal cousin; prostate cancer    Breast Cancer Other 40        maternal cousin    Cancer - colorectal Other 70        Maternal great grandfather    Breast Cancer Other     Colon Cancer Other     Diabetes Other     Other Cancer Other        Social History:  Social History     Socioeconomic History    Marital status:      Spouse name: Danny    Number of children: 2    Years of education: Not on file    Highest education level: Not on file   Occupational History    Occupation: [email or web address]     Employer: SELF   Tobacco Use    Smoking status: Never    Smokeless tobacco: Never   Vaping Use    Vaping status: Never Used   Substance and Sexual Activity    Alcohol use: Not Currently    Drug use: No    Sexual activity: Yes     Partners: Male     Birth control/protection: Male Surgical     Comment: vasectomy   Other Topics Concern    Parent/sibling w/ CABG, MI or angioplasty before 65F 55M? Not Asked     Service Not Asked    Blood Transfusions Not Asked    Caffeine Concern No    Occupational Exposure No    Hobby Hazards No    Sleep Concern No    Stress Concern Yes    Weight Concern Yes     Comment: in the process of getting back to prepregnancy weight    Special Diet No    Back Care Not Asked    Exercise No     Comment: keeping busy with 2 children under the age of 4; exercising using high intensity interval classes    Bike Helmet Not Asked    Seat Belt Not Asked    Self-Exams Yes   Social History Narrative    Not on file     Social Drivers of Health     Financial Resource Strain: Not on file   Food Insecurity: Not on file   Transportation Needs: Unknown (2021)    [facility]    Transportation Needs     Lack reliable transportation: Not on file   Physical Activity: Not on file  "  Stress: Not on file   Social Connections: Unknown (2/6/2021)    [facility]    Social Connections     Do your friends and family support you?: Not on file     What agencies support you?: Not on file   Interpersonal Safety: Low Risk  (11/15/2024)    Interpersonal Safety     Do you feel physically and emotionally safe where you currently live?: Yes     Within the past 12 months, have you been hit, slapped, kicked or otherwise physically hurt by someone?: No     Within the past 12 months, have you been humiliated or emotionally abused in other ways by your partner or ex-partner?: No   Housing Stability: Not on file       Physical Exam:  Ht 1.676 m (5' 6\")   Wt 81.6 kg (180 lb)   LMP 10/13/2024 (Exact Date)   BMI 29.05 kg/m    GENERAL: alert and no distress  EYES: Eyes grossly normal to inspection.  No discharge or erythema, or obvious scleral/conjunctival abnormalities.  RESP: No audible wheeze, cough, or visible cyanosis.    SKIN: Visible skin clear. No significant rash, abnormal pigmentation or lesions.  NEURO: Cranial nerves grossly intact.  Mentation and speech appropriate for age.  PSYCH: Appropriate affect, tone, and pace of words      Laboratory/Imaging Studies  No results found for any visits on 11/25/24.    RODO Zarate states that she is doing well at this visit. She is recovering well from having a THBS about a week ago with Dr. Monahan. She has no updates to her family's medical history.     We discussed the plan below. I would like to continue screening her ovaries until she is ready to have them removed, closer to age 45. We also discussed the updated BRCA2 screening guidelines which recommend pancreatic cancer screening for all those with a BRCA2 mutation, at age 50 or ten years prior to the youngest pancreatic cancer in the family.     We discussed symptoms to monitor for between visits, including changes to her bowel or bladder " habits, unresolved abdominal bloating, abdominal pain, and early satiety. I would like to see her back in one year.     INDIVIDUALIZED SCREENING PLAN:      Individualized Surveillance Plan for women  Hereditary Breast and/or Ovarian Cancer Syndrome   Per NCCN Guidelines Version 1.2025   Recommended screening Test or procedure Last done Next Scheduled    Breast self awareness starting at age 18.    Breast cancer risk:  BRCA1+: 60-72%  BRCA2+: 55-69% Women should be familiar with their breasts and promptly report changes to their care provider. Periodic, consistent self exam may facilitate breast self awareness. Premenopausal women may find self exams to be most informative when performed at the end of menses.   NA, s/p mastectomy   NA   Breast screening, starting at age 25 Clinical breast exams every 6 -12 months NA NA   Breast screening   Age 25-29 Annual breast MRI screening with contrast (or mammogram if MRI is unavailable) or individualized based on family history if a breast cancer diagnosis before age 30 is present.       Breast MRI is performed preferably on day 7-15 of menstrual cycle for premenopausal women.   NA   NA   Breast screening   Age >30-75 years     Annual mammogram (consider tomosynthesis mammogram) and annual screening MRI.     Breast MRI is performed preferably on day 7-15 of menstrual cycle for premenopausal women.    Age>75 years, management should be considered on an individual basis.   NA   NA   Ovarian cancer screening, starting at age 30-35    Absolute risk for epithelial ovarian cancer:  BRCA1+: 39-58%   BRCA2+: 13-29%    Consider transvaginal ultrasound and  tests.   11/15/2024: Hysterectomy and salpingectomy     6/25/2024: Transvaginal US, normal   Transvaginal US and Ca125 in January and July    Continue every 6 months while ovaries are intact   Pancreatic Cancer:    BRCA1+: <5%  BRCA2+: 5-10%      Consider Annual MRI/MRCP and/or Endoscopic Ultrasound    BRCA1+: Screening  beginning at age 50 or 10 years prior to the earliest pancreatic cancer on the BRCA-side of the family,  in families with exocrine pancreatic cancer in a first or second degree relative.    BRCA2+: Screening beginning at age 50 or 10 years prior to the earliest pancreatic cancer on the BRCA-side of the family.       No family history of pancreatic cancer   Consider screening at age 50   Recommendation: risk-reducing salpingo-oophorectomy(RRSO), typically between 35 and 40 years old recognizing childbearing is a consideration.    Because ovarian cancer onset in patients with BRCA2 mutations is on average of 8-10 years later than in patients with BRCA1 mutations, it is reasonable to delay RRSO until 40-45 years in patients with BRCA2 mutations  unless age at diagnosis in the family warrants earlier age for consideration for prophylactic surgery.    Limited data suggest that there may be a slightly increased risk of serous uterine cancer among individuals with a BRCA1/2 P/LP variant. The clinical significance of these findings is unclear. Further evaluation of the risk of serous uterine cancer in the BRCA population is ongoing.    Salpingectomy alone is not the standard of care for risk reduction although clinical trials are ongoing.     Discuss option of risk-reducing mastectomy.    Consider risks and benefits of risk reducing agents, such as tamoxifen and raloxifene for breast and ovarian cancer.    Consider a full body skin and eye exam for melanoma screening for both BRCA1+ and BRCA2+.     NOTE: Women with BRCA mutation who are treated for breast cancer should have screening of the remaining breast tissue with annual mammography and breast MRI.      The risk for breast cancer appears to be lower for the BRCA1 P0059G variant (24% by age 70 y). Screening should be individualized based on personal and family history       I spent a total of 24 minutes on the day of the visit. Please see the note for further  information on patient assessment and treatment.     Yolie Meeks DNP, APRN, AGCNS-BC  Clinical Nurse Specialist  Cancer Risk Management Program  [facility]    Cc:  Shameka Milligan DO

## 2024-11-18 NOTE — PROGRESS NOTES
[facility], Gynecologic Oncology: Post-op call    Surgery Date:  11/15/24    Description of Surgery:  Total laparoscopic hysterectomy, bilateral salpingectomy, Cystoscopy, Lysis of Adhesions     Notes: Upon reviewing patient's chart, noted patient's post-op telephone call was already completed earlier today by [facility] team.  Per notes, patient reports good pain control, and no concerns at this time.  See Post-Op Phone Call flowsheet for details.    Confirmed patient has a post-op appointment scheduled with Dr. Monahan:    Future Appointments   Date Time Provider Department Center   11/25/2024  8:30 AM Yolie Meeks APRN CNP [facility]   12/3/2024  9:15 AM Reinaldo Monaahn MD [facility]     John Ackerman, RN, BSN, OCN  RN Care Coordinator - Oncology  [facility]

## 2024-11-19 LAB
PATH REPORT.COMMENTS IMP SPEC: NORMAL
PATH REPORT.COMMENTS IMP SPEC: NORMAL
PATH REPORT.FINAL DX SPEC: NORMAL
PATH REPORT.GROSS SPEC: NORMAL
PATH REPORT.MICROSCOPIC SPEC OTHER STN: NORMAL
PATH REPORT.RELEVANT HX SPEC: NORMAL
PHOTO IMAGE: NORMAL

## 2024-11-25 ENCOUNTER — VIRTUAL VISIT (OUTPATIENT)
Dept: ONCOLOGY | Facility: CLINIC | Age: 39
End: 2024-11-25
Payer: COMMERCIAL

## 2024-11-25 VITALS — BODY MASS INDEX: 28.93 KG/M2 | HEIGHT: 66 IN | WEIGHT: 180 LBS

## 2024-11-25 DIAGNOSIS — Z15.09 BRCA2 POSITIVE: Primary | ICD-10-CM

## 2024-11-25 DIAGNOSIS — Z15.01 BRCA2 POSITIVE: Primary | ICD-10-CM

## 2024-11-25 DIAGNOSIS — Z12.73 SCREENING FOR OVARIAN CANCER: ICD-10-CM

## 2024-11-25 PROCEDURE — 99213 OFFICE O/P EST LOW 20 MIN: CPT | Mod: 95

## 2024-11-25 ASSESSMENT — PAIN SCALES - GENERAL: PAINLEVEL_OUTOF10: NO PAIN (0)

## 2024-11-25 NOTE — NURSING NOTE
Current patient location: [address] [place] [id number]    Is the patient currently in the state of [place]? YES    Visit mode:VIDEO    If the visit is dropped, the patient can be reconnected by:VIDEO VISIT: Send to e-mail at: [email or web address]    Will anyone else be joining the visit? NO  (If patient encounters technical issues they should call [phone]    Are changes needed to the allergy or medication list? No    Are refills needed on medications prescribed by this physician? NO    Rooming Documentation:  Questionnaire(s) not done per department protocol    Reason for visit: SEGUN SANTIAGO

## 2024-11-25 NOTE — PATIENT INSTRUCTIONS
Individualized Surveillance Plan for women  Hereditary Breast and/or Ovarian Cancer Syndrome   Per NCCN Guidelines Version 1.2025   Recommended screening Test or procedure Last done Next Scheduled    Breast self awareness starting at age 18.     Breast cancer risk:  BRCA1+: 60-72%  BRCA2+: 55-69% Women should be familiar with their breasts and promptly report changes to their care provider. Periodic, consistent self exam may facilitate breast self awareness. Premenopausal women may find self exams to be most informative when performed at the end of menses.    NA, s/p mastectomy    NA   Breast screening, starting at age 25 Clinical breast exams every 6 -12 months NA NA   Breast screening   Age 25-29 Annual breast MRI screening with contrast (or mammogram if MRI is unavailable) or individualized based on family history if a breast cancer diagnosis before age 30 is present.        Breast MRI is performed preferably on day 7-15 of menstrual cycle for premenopausal women.    NA    NA   Breast screening   Age >30-75 years       Annual mammogram (consider tomosynthesis mammogram) and annual screening MRI.      Breast MRI is performed preferably on day 7-15 of menstrual cycle for premenopausal women.     Age>75 years, management should be considered on an individual basis.    NA    NA   Ovarian cancer screening, starting at age 30-35     Absolute risk for epithelial ovarian cancer:  BRCA1+: 39-58%   BRCA2+: 13-29%     Consider transvaginal ultrasound and  tests.    11/15/2024: Hysterectomy and salpingectomy      6/25/2024: Transvaginal US, normal    Transvaginal US and Ca125 in January and July     Continue every 6 months while ovaries are intact   Pancreatic Cancer:     BRCA1+: <5%  BRCA2+: 5-10%        Consider Annual MRI/MRCP and/or Endoscopic Ultrasound     BRCA1+: Screening beginning at age 50 or 10 years prior to the earliest pancreatic cancer on the BRCA-side of the family,  in families with exocrine pancreatic  cancer in a first or second degree relative.     BRCA2+: Screening beginning at age 50 or 10 years prior to the earliest pancreatic cancer on the BRCA-side of the family.          No family history of pancreatic cancer    Consider screening at age 50   Recommendation: risk-reducing salpingo-oophorectomy(RRSO), typically between 35 and 40 years old recognizing childbearing is a consideration.     Because ovarian cancer onset in patients with BRCA2 mutations is on average of 8-10 years later than in patients with BRCA1 mutations, it is reasonable to delay RRSO until 40-45 years in patients with BRCA2 mutations  unless age at diagnosis in the family warrants earlier age for consideration for prophylactic surgery.     Limited data suggest that there may be a slightly increased risk of serous uterine cancer among individuals with a BRCA1/2 P/LP variant. The clinical significance of these findings is unclear. Further evaluation of the risk of serous uterine cancer in the BRCA population is ongoing.     Salpingectomy alone is not the standard of care for risk reduction although clinical trials are ongoing.      Discuss option of risk-reducing mastectomy.     Consider risks and benefits of risk reducing agents, such as tamoxifen and raloxifene for breast and ovarian cancer.     Consider a full body skin and eye exam for melanoma screening for both BRCA1+ and BRCA2+.      NOTE: Women with BRCA mutation who are treated for breast cancer should have screening of the remaining breast tissue with annual mammography and breast MRI.        The risk for breast cancer appears to be lower for the BRCA1 E6693C variant (24% by age 70 y). Screening should be individualized based on personal and family history

## 2024-11-25 NOTE — LETTER
2024      Tessa Ospina  [address]  [place] [address]      Dear Colleague,    Thank you for referring your patient, Tessa Ospina, to the [facility]. Please see a copy of my visit note below.    Virtual Visit Details    Type of service:  Video Visit     Originating Location (pt. Location): Home  Distant Location (provider location):  On-site  Platform used for Video Visit: [facility]      Oncology Risk Management Consultation:  Date on this visit: 2024    Tessa Ospina requires screening and surveillance to minimize her risk of cancer secondary to having a deleterious BRCA2 mutation. She is considered to be at high risk for hereditary breast and ovarian cancer.      Primary Physician: Shameka Milligan DO     History Of Present Illness:  Ms. Ospina is a very pleasant, healthy 39 year old female who presents with BRCA2+ associated Hereditary Breast and Ovarian Cancer Syndrome.      Genetic testin2015 - POSITIVE for a BRCA2 mutation specifically [id number], the same genetic mutation in her mother. The testing was done using single site analysis through [employer].     Pertinent  history:  Menarche at age 13.  First child at age 26.    History of breastfeeding both children. No issues with mastitis.   Ovaries intact.    11/15/2024: Hysterectomy and bilateral salpingectomy with Dr. Monahan  Uterus, cervix, bilateral fallopian tube, total laparoscopic hysterectomy and salpingectomy:  - Late secretory endometrium  - Unremarkable myometrium  - Uterine serosal adhesions  - Cervix with Nabothian cysts and endocervical microglandular hyperplasia   - Bilateral fallopian tubes with paratubal cysts  - Negative for malignancy    No history of breast hyperplasia, atypia or malignancy.   2016 - Prophylactic nipple sparing mastectomy with reconstruction (Dr. Nely Mcqueen and Dr. Lam Arora), pathology benign.  2021- Prophylactic nipple removal. Pathology:  Right  breast nipple and skin   B: Left breast nipple and skin FINAL DIAGNOSIS:   A: Skin and nipple, right breast, excision:   - Benign nipple and skin   - Negative for atypia or malignancy   B: Skin and nipple, left breast, excision:   - Benign nipple and skin, with squamous metaplasia in a lactiferous duct   - Negative for atypia or malignancy      Ovarian Screening history:  11/3/15 - Pelvic ultrasound - probable uterine fibroid in anterior myometrium and L ovarian collapsing follicle.   1/26/16: pelvic ultrasound, L complex ovarian lesion.  3/4/16: Pelvic ultrasound, small L ovarian cyst.   11/16/2016 - Pelvic US, normal. No lesions seen.  6/7/2017 - Pelvic US, normal, 2 cm dominant follicle in R ovary  1/25/2018 - 2 dominant cysts in R ovary, consider short term follow up  3/12/2018 - R cysts resolved, pelvic US normal.  10/31/2018 -Pelvic ultrasound, negative pelvic ultrasound.  4/26/2019 - Pelvic ultrasound, Probable corpus luteal cyst in the right ovary. No other  definite abnormality  1/6/2020- Transvaginal ultrasound, Slightly thickened endometrium for age and menstrual status. Similar-appearing small cystic lesion in the right ovary, this may simply represent a recurrent ruptured follicle.  7/29/2020- Transvaginal ultrasound, Abnormally thickened endometrium for age and menstrual status, but similar to previous.No fibroids Probable ruptured follicle in the left ovary.  1/13/2021- Transvaginal ultrasound: Endometrial stripe remains upper normal limits to slightly  thickened given LMP 12/29/2020 but is similar to prior studies and is  otherwise unremarkable. Ovaries within normal limits with an incidental corpus luteum in the right ovary noted.  9/20/2021- Transvaginal ultrasound. Uniform linear echogenic endometrium, normal ovaries and uterus.  4/12/2022- Transvaginal US, normal  10/27/2022: Transvaginal US, 2.6 cm simple left ovarian cyst. Otherwise normal pelvic ultrasound.   5/9/2023: Transvaginal US, Small  fibroid within the anterior uterine body measuring 1 cm.Otherwise normal pelvic ultrasound.  10/18/2023: Transvaginal US, normal   2024: Transvaginal US, normal     3/19/2015 -  - 9  2015 -  - 17  3/3/2016 -  39 (high)  2016  - - 23  2017 -  - 23  2018 -  - 11  2019 -  - 14  11/3/2020 -  - 14  2021-  - 12  2022- -84  2023: , 20  3/13/2024: , 14    2023- Shave biopsy of , R lower back (Lady Alcantara PA-C)  - Shave biopsy today; see procedure note below.  A(1). Skin, R lower back, shave:  -  Junctional dysplastic nevus with moderate atypia       She denies urinary urgency and frequency, bloating, constipation, unusual vaginal discharge and early satiety.     Past Medical/Surgical History:  Past Medical History:   Diagnosis Date     BRCA2 positive     has had bilateral mastectomies and reconstruction, still has ovaries     PONV (postoperative nausea and vomiting)      Past Surgical History:   Procedure Laterality Date     BREAST BIOPSY, RT/LT Right 2015    benign      SECTION  , 2013     x 2     COSMETIC SURGERY  2016, 2016    Prophylactic bilateral mastectomy and reconstruction     GRAFT FAT TO BREAST Bilateral 2017    Procedure: GRAFT FAT TO BREAST;  Bilateral Breast mastopexy and Fat Grafting from Abdomen;  Surgeon: SUZY Arora MD;  [facility]     HYSTERECTOMY, TOTAL, LAPAROSCOPIC, WITH SALPINGECTOMY, CYSTOSCOPY Bilateral 11/15/2024    Procedure: Total laparoscopic hysterectomy, bilateral salpingectomy, Cystoscopy, Lysis of Adhesions;  Surgeon: Reinaldo Monahan MD;  [facility]     MASTECTOMY, NIPPLE SPARING Bilateral 6/15/2016    Procedure: MASTECTOMY, NIPPLE SPARING;  Surgeon: Nely Mcqueen MD;  [facility]     RECONSTRUCT BREAST BILATERAL Bilateral 6/15/2016    Procedure: RECONSTRUCT BREAST BILATERAL;  Surgeon: SUZY Arora  MD Hilda;  [facility]     RECONSTRUCT BREAST SECOND STAGE BILATERAL N/A 9/15/2016    Procedure: RECONSTRUCT BREAST SECOND STAGE BILATERAL;  Surgeon: SUZY Arora MD;  [facility]     TONSILLECTOMY & ADENOIDECTOMY  2007     [facility]         Allergies:  Allergies as of 11/25/2024 - Reviewed 11/25/2024   Allergen Reaction Noted     Oxycodone-acetaminophen Nausea and Vomiting and Nausea 04/17/2015     Vicodin [hydrocodone-acetaminophen] Nausea and Vomiting 09/30/2010     Tramadol Nausea and Vomiting and Nausea 02/21/2018       Current Medications:  Current Outpatient Medications   Medication Sig Dispense Refill     acetaminophen (TYLENOL) 325 MG tablet Take 2 tablets (650 mg) by mouth every 6 hours as needed for mild pain. 24 tablet 0     ibuprofen (ADVIL/MOTRIN) 600 MG tablet Take 1 tablet (600 mg) by mouth every 6 hours as needed for other (mild and/or inflammatory pain.). 12 tablet 0     magnesium 250 MG tablet Take 1 tablet by mouth daily       Multiple Vitamin (MULTIVITAMINS PO)        ondansetron (ZOFRAN ODT) 4 MG ODT tab Take 1 tablet (4 mg) by mouth every 8 hours as needed for nausea. 4 tablet 0     phenazopyridine (PYRIDIUM) 100 MG tablet Take 1 tablet (100 mg) by mouth 3 times daily as needed for urinary tract discomfort. 5 tablet 0     senna-docusate (SENOKOT-S/PERICOLACE) 8.6-50 MG tablet Take 1-2 tablets by mouth 2 times daily. 30 tablet 0     tolterodine ER (DETROL LA) 4 MG 24 hr capsule Take 1 capsule (4 mg) by mouth daily. 5 capsule 0        Family History:  Family History   Problem Relation Age of Onset     Breast Cancer Mother 49        BRCA2 genetic mutation     Pre-Diabetes Mother      Other - See Comments Mother 61        proctosigmoiditis, infection in one implant, kidney failure, subsequent intestinal issues     Vertigo Maternal Grandmother      Other Cancer Maternal Grandfather 60        Prostate and throat     Prostate Cancer Maternal Grandfather         likely  BRCA2+     Crohn's Disease Maternal Grandfather      Meniere's disease Paternal Grandmother      Genetic Disorder Maternal Aunt         BRCA2 genetic mutation     Breast Cancer Maternal Aunt 70     Cancer Maternal Uncle 70        3 uncles (all 70s and 80s)     Cancer - colorectal Paternal Uncle 40         in 40s     Breast Cancer Cousin      Breast Cancer Other 30        maternal cousin; BRCA2 genetic mutation     Other Cancer Other 60        maternal cousin; prostate cancer     Breast Cancer Other 40        maternal cousin     Cancer - colorectal Other 70        Maternal great grandfather     Breast Cancer Other      Colon Cancer Other      Diabetes Other      Other Cancer Other        Social History:  Social History     Socioeconomic History     Marital status:      Spouse name: Danny     Number of children: 2     Years of education: Not on file     Highest education level: Not on file   Occupational History     Occupation: [employer]     Employer: SELF   Tobacco Use     Smoking status: Never     Smokeless tobacco: Never   Vaping Use     Vaping status: Never Used   Substance and Sexual Activity     Alcohol use: Not Currently     Drug use: No     Sexual activity: Yes     Partners: Male     Birth control/protection: Male Surgical     Comment: vasectomy   Other Topics Concern     Parent/sibling w/ CABG, MI or angioplasty before 65F 55M? Not Asked      Service Not Asked     Blood Transfusions Not Asked     Caffeine Concern No     Occupational Exposure No     Hobby Hazards No     Sleep Concern No     Stress Concern Yes     Weight Concern Yes     Comment: in the process of getting back to prepregnancy weight     Special Diet No     Back Care Not Asked     Exercise No     Comment: keeping busy with 2 children under the age of 4; exercising using high intensity interval classes     Bike Helmet Not Asked     Seat Belt Not Asked     Self-Exams Yes   Social History Narrative     Not on file     Social Drivers  "of Health     Financial Resource Strain: Not on file   Food Insecurity: Not on file   Transportation Needs: Unknown (2/6/2021)    [facility]    Transportation Needs      Lack reliable transportation: Not on file   Physical Activity: Not on file   Stress: Not on file   Social Connections: Unknown (2/6/2021)    [facility]    Social Connections      Do your friends and family support you?: Not on file      What agencies support you?: Not on file   Interpersonal Safety: Low Risk  (11/15/2024)    Interpersonal Safety      Do you feel physically and emotionally safe where you currently live?: Yes      Within the past 12 months, have you been hit, slapped, kicked or otherwise physically hurt by someone?: No      Within the past 12 months, have you been humiliated or emotionally abused in other ways by your partner or ex-partner?: No   Housing Stability: Not on file       Physical Exam:  Ht 1.676 m (5' 6\")   Wt 81.6 kg (180 lb)   LMP 10/13/2024 (Exact Date)   BMI 29.05 kg/m    GENERAL: alert and no distress  EYES: Eyes grossly normal to inspection.  No discharge or erythema, or obvious scleral/conjunctival abnormalities.  RESP: No audible wheeze, cough, or visible cyanosis.    SKIN: Visible skin clear. No significant rash, abnormal pigmentation or lesions.  NEURO: Cranial nerves grossly intact.  Mentation and speech appropriate for age.  PSYCH: Appropriate affect, tone, and pace of words      Laboratory/Imaging Studies  No results found for any visits on 11/25/24.    ASSESSMENT      Tessa states that she is doing well at this visit. She is recovering well from having a THBS about a week ago with Dr. Monahan. She has no updates to her family's medical history.     We discussed the plan below. I would like to continue screening her ovaries until she is ready to have them removed, closer to age 45. We " also discussed the updated BRCA2 screening guidelines which recommend pancreatic cancer screening for all those with a BRCA2 mutation, at age 50 or ten years prior to the youngest pancreatic cancer in the family.     We discussed symptoms to monitor for between visits, including changes to her bowel or bladder habits, unresolved abdominal bloating, abdominal pain, and early satiety. I would like to see her back in one year.     INDIVIDUALIZED SCREENING PLAN:      Individualized Surveillance Plan for women  Hereditary Breast and/or Ovarian Cancer Syndrome   Per NCCN Guidelines Version 1.2025   Recommended screening Test or procedure Last done Next Scheduled    Breast self awareness starting at age 18.    Breast cancer risk:  BRCA1+: 60-72%  BRCA2+: 55-69% Women should be familiar with their breasts and promptly report changes to their care provider. Periodic, consistent self exam may facilitate breast self awareness. Premenopausal women may find self exams to be most informative when performed at the end of menses.   NA, s/p mastectomy   NA   Breast screening, starting at age 25 Clinical breast exams every 6 -12 months NA NA   Breast screening   Age 25-29 Annual breast MRI screening with contrast (or mammogram if MRI is unavailable) or individualized based on family history if a breast cancer diagnosis before age 30 is present.       Breast MRI is performed preferably on day 7-15 of menstrual cycle for premenopausal women.   NA   NA   Breast screening   Age >30-75 years     Annual mammogram (consider tomosynthesis mammogram) and annual screening MRI.     Breast MRI is performed preferably on day 7-15 of menstrual cycle for premenopausal women.    Age>75 years, management should be considered on an individual basis.   NA   NA   Ovarian cancer screening, starting at age 30-35    Absolute risk for epithelial ovarian cancer:  BRCA1+: 39-58%   BRCA2+: 13-29%    Consider transvaginal ultrasound and  tests.    11/15/2024: Hysterectomy and salpingectomy     6/25/2024: Transvaginal US, normal   Transvaginal US and Ca125 in January and July    Continue every 6 months while ovaries are intact   Pancreatic Cancer:    BRCA1+: <5%  BRCA2+: 5-10%      Consider Annual MRI/MRCP and/or Endoscopic Ultrasound    BRCA1+: Screening beginning at age 50 or 10 years prior to the earliest pancreatic cancer on the BRCA-side of the family,  in families with exocrine pancreatic cancer in a first or second degree relative.    BRCA2+: Screening beginning at age 50 or 10 years prior to the earliest pancreatic cancer on the BRCA-side of the family.       No family history of pancreatic cancer   Consider screening at age 50   Recommendation: risk-reducing salpingo-oophorectomy(RRSO), typically between 35 and 40 years old recognizing childbearing is a consideration.    Because ovarian cancer onset in patients with BRCA2 mutations is on average of 8-10 years later than in patients with BRCA1 mutations, it is reasonable to delay RRSO until 40-45 years in patients with BRCA2 mutations  unless age at diagnosis in the family warrants earlier age for consideration for prophylactic surgery.    Limited data suggest that there may be a slightly increased risk of serous uterine cancer among individuals with a BRCA1/2 P/LP variant. The clinical significance of these findings is unclear. Further evaluation of the risk of serous uterine cancer in the BRCA population is ongoing.    Salpingectomy alone is not the standard of care for risk reduction although clinical trials are ongoing.     Discuss option of risk-reducing mastectomy.    Consider risks and benefits of risk reducing agents, such as tamoxifen and raloxifene for breast and ovarian cancer.    Consider a full body skin and eye exam for melanoma screening for both BRCA1+ and BRCA2+.     NOTE: Women with BRCA mutation who are treated for breast cancer should have screening of the remaining breast tissue  with annual mammography and breast MRI.      The risk for breast cancer appears to be lower for the BRCA1 D1748T variant (24% by age 70 y). Screening should be individualized based on personal and family history       I spent a total of 24 minutes on the day of the visit. Please see the note for further information on patient assessment and treatment.     Yolie Meeks DNP, MESHA, BENNY-BC  Clinical Nurse Specialist  Cancer Risk Management Program  [facility]    Cc:  Shameka Milligan, DO                Again, thank you for allowing me to participate in the care of your patient.        Sincerely,        MESHA Corrales CNP

## 2024-12-03 ENCOUNTER — ONCOLOGY VISIT (OUTPATIENT)
Dept: ONCOLOGY | Facility: CLINIC | Age: 39
End: 2024-12-03
Attending: OBSTETRICS & GYNECOLOGY
Payer: COMMERCIAL

## 2024-12-03 VITALS
BODY MASS INDEX: 29.72 KG/M2 | DIASTOLIC BLOOD PRESSURE: 74 MMHG | WEIGHT: 184.9 LBS | RESPIRATION RATE: 16 BRPM | SYSTOLIC BLOOD PRESSURE: 108 MMHG | HEART RATE: 89 BPM | HEIGHT: 66 IN | OXYGEN SATURATION: 97 %

## 2024-12-03 DIAGNOSIS — Z15.01 BRCA2 POSITIVE: Primary | ICD-10-CM

## 2024-12-03 DIAGNOSIS — Z15.09 BRCA2 POSITIVE: Primary | ICD-10-CM

## 2024-12-03 PROCEDURE — 99024 POSTOP FOLLOW-UP VISIT: CPT | Performed by: OBSTETRICS & GYNECOLOGY

## 2024-12-03 PROCEDURE — G0463 HOSPITAL OUTPT CLINIC VISIT: HCPCS | Performed by: OBSTETRICS & GYNECOLOGY

## 2024-12-03 ASSESSMENT — PAIN SCALES - GENERAL: PAINLEVEL_OUTOF10: NO PAIN (0)

## 2024-12-03 NOTE — PROGRESS NOTES
Gynecologic Oncology Clinic - Post-operative appointment    Visit date: Dec 3, 2024     Reason for visit: post-op    Interval history: Tessa Ospina is a 39 year old with BRCA2 mutation here for routine post-op exam after Total laparoscopic hysterectomy bilateral salpingectomy (ovarian preservation).  Surgery was complicated only by significant bladder adhesions.     She is overall doing well.  Initially had a lot of pelvic bladder pain but that has improved.  Incisions are pruritic but that is also improving.  No specific concerns.  No heavy vaginal bleeding.      Past Surgical History:   Procedure Laterality Date    BREAST BIOPSY, RT/LT Right 2015    benign     SECTION  , 2013     x 2    COSMETIC SURGERY  2016, 2016    Prophylactic bilateral mastectomy and reconstruction    GRAFT FAT TO BREAST Bilateral 2017    Procedure: GRAFT FAT TO BREAST;  Bilateral Breast mastopexy and Fat Grafting from Abdomen;  Surgeon: SUZY Arora MD;  [facility]    HYSTERECTOMY, TOTAL, LAPAROSCOPIC, WITH SALPINGECTOMY, CYSTOSCOPY Bilateral 11/15/2024    Procedure: Total laparoscopic hysterectomy, bilateral salpingectomy, Cystoscopy, Lysis of Adhesions;  Surgeon: Reinaldo Monahan MD;  [facility]    MASTECTOMY, NIPPLE SPARING Bilateral 6/15/2016    Procedure: MASTECTOMY, NIPPLE SPARING;  Surgeon: Nely Mcqueen MD;  [facility]    RECONSTRUCT BREAST BILATERAL Bilateral 6/15/2016    Procedure: RECONSTRUCT BREAST BILATERAL;  Surgeon: SUZY Arora MD;  [facility]    RECONSTRUCT BREAST SECOND STAGE BILATERAL N/A 9/15/2016    Procedure: RECONSTRUCT BREAST SECOND STAGE BILATERAL;  Surgeon: SUZY Arora MD;  [facility]    TONSILLECTOMY & ADENOIDECTOMY      [facility]         Physical Exam:  LMP 10/13/2024 (Exact Date)    General appearance: no acute distress, well groomed, sitting comfortably   Abdomen: incision(s) healing  well    Pathology:  Lab Results   Component Value Date    CASEREPORT  11/15/2024     Surgical Pathology Report                         Case: [id number]                                  Authorizing Provider:  Reinaldo Monahan MD         Collected:           11/15/2024 09:47 AM          Ordering Location:      MAIN OR                 Received:            11/15/2024 09:54 AM          Pathologist:           Jessica Li MD                                                           Specimen:    Uterus, Cervix, Bilateral Fallopian Tubes, Uterus, Cervix and Bilateral Fallopian                   Tubes                                                                                      FINALDX  11/15/2024     Uterus, cervix, bilateral fallopian tube, total laparoscopic hysterectomy and salpingectomy:  - Late secretory endometrium  - Unremarkable myometrium  - Uterine serosal adhesions  - Cervix with Nabothian cysts and endocervical microglandular hyperplasia   - Bilateral fallopian tubes with paratubal cysts  - Negative for malignancy            Assessment/Plan: Tessa Ospina is a 39 year old with pathogenic BRCA2 mutation now status post total laparoscopic hysterectomy and bilateral salpingectomy (ovarian preservation) doing well postoperatively.    -Reviewed pathology which is benign.  - Continue postop restrictions including nothing in the vagina for 6 weeks.  She can increase other activities slowly over the coming weeks.  Continue to avoid constipation especially in the first 6 to 8 weeks after surgery.    - She can continue to [facility] with [facility] for ultrasound follow-up of the remaining ovaries with referral back to our clinic when she is ready to proceed with risk reducing oophorectomy. For BRCA2 she can consider this at any time, but it is recomended by age 45 to balance risks of ovarian cancer and premature menopause.     Reinaldo Monahan MD

## 2024-12-03 NOTE — LETTER
12/3/2024      Tessa Ospina  [address]  [place] [address]      Dear Colleague,    Thank you for referring your patient, Tessa Ospina, to the [facility]. Please see a copy of my visit note below.    Gynecologic Oncology Clinic - Post-operative appointment    Visit date: Dec 3, 2024     Reason for visit: post-op    Interval history: Tessa Ospina is a 39 year old with BRCA2 mutation here for routine post-op exam after Total laparoscopic hysterectomy bilateral salpingectomy (ovarian preservation).  Surgery was complicated only by significant bladder adhesions.     She is overall doing well.  Initially had a lot of pelvic bladder pain but that has improved.  Incisions are pruritic but that is also improving.  No specific concerns.  No heavy vaginal bleeding.      Past Surgical History:   Procedure Laterality Date     BREAST BIOPSY, RT/LT Right 2015    benign      SECTION  , 2013     x 2     COSMETIC SURGERY  2016, 2016    Prophylactic bilateral mastectomy and reconstruction     GRAFT FAT TO BREAST Bilateral 2017    Procedure: GRAFT FAT TO BREAST;  Bilateral Breast mastopexy and Fat Grafting from Abdomen;  Surgeon: SUZY Arora MD;  [facility]     HYSTERECTOMY, TOTAL, LAPAROSCOPIC, WITH SALPINGECTOMY, CYSTOSCOPY Bilateral 11/15/2024    Procedure: Total laparoscopic hysterectomy, bilateral salpingectomy, Cystoscopy, Lysis of Adhesions;  Surgeon: Reinaldo Monahan MD;  Location: [facility] OR     MASTECTOMY, NIPPLE SPARING Bilateral 6/15/2016    Procedure: MASTECTOMY, NIPPLE SPARING;  Surgeon: Nely Mcqueen MD;  [facility]     RECONSTRUCT BREAST BILATERAL Bilateral 6/15/2016    Procedure: RECONSTRUCT BREAST BILATERAL;  Surgeon: SUZY Arora MD;  [facility]     RECONSTRUCT BREAST SECOND STAGE BILATERAL N/A 9/15/2016    Procedure: RECONSTRUCT BREAST SECOND STAGE BILATERAL;  Surgeon: SUZY Arora MD;   [facility]     TONSILLECTOMY & ADENOIDECTOMY  2007     [facility]         Physical Exam:  LMP 10/13/2024 (Exact Date)    General appearance: no acute distress, well groomed, sitting comfortably   Abdomen: incision(s) healing well    Pathology:  Lab Results   Component Value Date    CASEREPORT  11/15/2024     Surgical Pathology Report                         Case: [id number]                                  Authorizing Provider:  Reinaldo Monahan MD         Collected:           11/15/2024 09:47 AM          Ordering Location:     [facility]                 Received:            11/15/2024 09:54 AM          Pathologist:           Jessica Li MD                                                           Specimen:    Uterus, Cervix, Bilateral Fallopian Tubes, Uterus, Cervix and Bilateral Fallopian                   Tubes                                                                                      FINALDX  11/15/2024     Uterus, cervix, bilateral fallopian tube, total laparoscopic hysterectomy and salpingectomy:  - Late secretory endometrium  - Unremarkable myometrium  - Uterine serosal adhesions  - Cervix with Nabothian cysts and endocervical microglandular hyperplasia   - Bilateral fallopian tubes with paratubal cysts  - Negative for malignancy            Assessment/Plan: Tessa Ospina is a 39 year old with pathogenic BRCA2 mutation now status post total laparoscopic hysterectomy and bilateral salpingectomy (ovarian preservation) doing well postoperatively.    -Reviewed pathology which is benign.  - Continue postop restrictions including nothing in the vagina for 6 weeks.  She can increase other activities slowly over the coming weeks.  Continue to avoid constipation especially in the first 6 to 8 weeks after surgery.    - She can continue to follow with [facility] for ultrasound follow-up of the remaining ovaries with referral back to our clinic when she is ready to proceed with  risk reducing oophorectomy. For BRCA2 she can consider this at any time, but it is recomended by age 45 to balance risks of ovarian cancer and premature menopause.     Reinaldo Monahan MD       Again, thank you for allowing me to participate in the care of your patient.        Sincerely,        Reinaldo Monahan MD

## 2024-12-03 NOTE — NURSING NOTE
"Oncology Rooming Note    December 3, 2024 9:16 AM   Tessa Ospina is a 39 year old female who presents for:    Chief Complaint   Patient presents with    Oncology Clinic Visit     Post op DOS 11/15     Initial Vitals: /74   Pulse 89   Resp 16   Ht 1.689 m (5' 6.5\")   Wt 83.9 kg (184 lb 14.4 oz)   LMP 10/13/2024 (Exact Date)   SpO2 97%   BMI 29.40 kg/m   Estimated body mass index is 29.4 kg/m  as calculated from the following:    Height as of this encounter: 1.689 m (5' 6.5\").    Weight as of this encounter: 83.9 kg (184 lb 14.4 oz). Body surface area is 1.98 meters squared.  No Pain (0) Comment: Data Unavailable   Patient's last menstrual period was 10/13/2024 (exact date).  Allergies reviewed: Yes  Medications reviewed: Yes    Medications: Medication refills not needed today.  Pharmacy name entered into [employer]: [facility] [id number] [place] [id number]    Frailty Screening:   Is the patient here for a new oncology consult visit in cancer care? 2. No      Clinical concerns: No new concerns         Marcelle Curtis LPN              "

## 2025-04-19 ENCOUNTER — HEALTH MAINTENANCE LETTER (OUTPATIENT)
Age: 40
End: 2025-04-19

## 2025-04-28 ENCOUNTER — MYC MEDICAL ADVICE (OUTPATIENT)
Dept: ONCOLOGY | Facility: CLINIC | Age: 40
End: 2025-04-28
Payer: COMMERCIAL

## 2025-04-28 NOTE — TELEPHONE ENCOUNTER
"[facility]: Cancer Care Note                                    Situation:                                                      Received [employer] message from patient today, reporting bleeding symptoms.    Background:                                                      Patient is BRCA2 positive.  She underwent surgery with Dr. Monahan on 11/15/24 (Total laparoscopic hysterectomy, bilateral salpingectomy - with ovarian preservation).      Per chart notes, surgery was complicated by significant bladder adhesions.      Final surgical pathology was benign.    Assessment:                                                      Patient reports the following symptom concerns:    She has had approximately 10 episodes of vaginal spotting/light vaginal bleeding since her surgery.  Per patient, this typically happens after intercourse, and sometimes will just be pink-tinged vaginal discharge/mucus.    Her most recent bleeding episode was this morning, where she filled 1/3 of a panty liner with blood within about 1 hour.  Her bleeding has since stopped.    Patient also reports a feeling of pelvic cramping and some fullness when she has the bleeding. She also states it feels \"weird\" to bear down to have a bowel movement.    Patient also states her vagina has felt pretty raw since surgery, and very dry.      Patient denies complaints of heavy vaginal bleeding, large clots, fever, chills, nausea, vomiting, dizziness, shortness of breath, chest pain, abdominal pain, or foul-smelling vaginal discharge.      Recommendations & Plan:                                                       Reviewed patient's symptom concerns with provider Nasrin Villasenor CNP, who recommends having patient schedule an in-person visit for exam.  Patient was notified, and verbalized understanding.      Patient accepted a visit with Nasrin tomorrow (4/29/25) at the [facility].  Appointment scheduled and patient was provided with the " details.      John Ackerman, RN, BSN, OCN  RN Care Coordinator - Oncology  [facility]

## 2025-04-29 ENCOUNTER — ONCOLOGY VISIT (OUTPATIENT)
Dept: ONCOLOGY | Facility: CLINIC | Age: 40
End: 2025-04-29
Attending: OBSTETRICS & GYNECOLOGY
Payer: COMMERCIAL

## 2025-04-29 VITALS
RESPIRATION RATE: 16 BRPM | SYSTOLIC BLOOD PRESSURE: 109 MMHG | BODY MASS INDEX: 29.66 KG/M2 | HEIGHT: 67 IN | HEART RATE: 81 BPM | WEIGHT: 189 LBS | DIASTOLIC BLOOD PRESSURE: 76 MMHG | TEMPERATURE: 98.6 F | OXYGEN SATURATION: 98 %

## 2025-04-29 DIAGNOSIS — Z90.710 S/P LAPAROSCOPIC HYSTERECTOMY: ICD-10-CM

## 2025-04-29 DIAGNOSIS — N93.0 PCB (POST COITAL BLEEDING): Primary | ICD-10-CM

## 2025-04-29 DIAGNOSIS — N84.2 VAGINAL POLYP: ICD-10-CM

## 2025-04-29 PROCEDURE — 57100 BIOPSY VAGINAL MUCOSA SIMPLE: CPT | Performed by: NURSE PRACTITIONER

## 2025-04-29 PROCEDURE — G0463 HOSPITAL OUTPT CLINIC VISIT: HCPCS | Mod: 25 | Performed by: NURSE PRACTITIONER

## 2025-04-29 PROCEDURE — 99213 OFFICE O/P EST LOW 20 MIN: CPT | Mod: 25 | Performed by: NURSE PRACTITIONER

## 2025-04-29 PROCEDURE — 88305 TISSUE EXAM BY PATHOLOGIST: CPT | Mod: TC | Performed by: NURSE PRACTITIONER

## 2025-04-29 PROCEDURE — 88305 TISSUE EXAM BY PATHOLOGIST: CPT | Mod: 26 | Performed by: PATHOLOGY

## 2025-04-29 ASSESSMENT — PAIN SCALES - GENERAL: PAINLEVEL_OUTOF10: NO PAIN (0)

## 2025-04-29 NOTE — PROGRESS NOTES
"Oncology Rooming Note    April 29, 2025 10:55 AM   Tessa Ospina is a 39 year old female who presents for:    Chief Complaint   Patient presents with    Oncology Clinic Visit     Initial Vitals: /76   Pulse 81   Temp 98.6  F (37  C) (Oral)   Resp 16   Ht 1.689 m (5' 6.5\")   Wt 85.7 kg (189 lb)   LMP 10/13/2024 (Exact Date)   SpO2 98%   BMI 30.05 kg/m   Estimated body mass index is 30.05 kg/m  as calculated from the following:    Height as of this encounter: 1.689 m (5' 6.5\").    Weight as of this encounter: 85.7 kg (189 lb). Body surface area is 2.01 meters squared.  No Pain (0) Comment: Data Unavailable   Patient's last menstrual period was 10/13/2024 (exact date).  Allergies reviewed: Yes  Medications reviewed: Yes    Medications: Medication refills not needed today.  Pharmacy [facility] entered into [employer]: [id number] [place] [address] AT [facility] [address]    Frailty Screening:   Is the patient here for a new oncology consult visit in cancer care? 2. No    PHQ9:  Did this patient require a PHQ9?: Yes   If the patient required a PHQ9 assessment, did the results require a follow up with the Provider/Nurse?: No          Sadia Redmond MA            "

## 2025-04-29 NOTE — PATIENT INSTRUCTIONS
Nothing in the vagina the next 3-7 days  You may have some gray or spotting in the underwear the next few days  I want to see you back in 2-3 weeks

## 2025-04-29 NOTE — LETTER
"2025      Tessa Ospina  [address]  [facility] [address]      Dear Colleague,    Thank you for referring your patient, Tessa Ospina, to the [facility]. Please see a copy of my visit note below.    Gynecologic Oncology Follow Up Note    RE: Tessa Ospina   : 1985  THEO: 2025  GYNECOLOGIC ONCOLOGIST: Reinaldo Monahan MD    CC:  post-coital bleeding    INTERVAL HISTORY:  Tessa is a 39 year old female with a BRCA2 mutation s/p prophylactic TLH/BS on 11/15/24 presenting for an acute visit regarding post-coital bleeding.    She has had spotting after intercourse about 10 times since her surgery- intercourse is no longer enjoyable as her tissue feels \"raw.\" Typically has spotting, but yesterday, had pelvic cramping and she noticed bright red blood in her liner which lasted for about three hours. Felt uncomfortable to have a bowel movement yesterday after that. Bleeding has stopped.    Denies fevers, urinary symptoms, persistent abdominal or pelvic pain, vaginal discharge or leakage of fluids, or persistent issues with her bowels.           Past Medical History:   Diagnosis Date     BRCA2 positive     has had bilateral mastectomies and reconstruction, still has ovaries     PONV (postoperative nausea and vomiting)       Past Surgical History:   Procedure Laterality Date     BREAST BIOPSY, RT/LT Right 2015    benign      SECTION  , 2013     x 2     COSMETIC SURGERY  2016, 2016    Prophylactic bilateral mastectomy and reconstruction     GRAFT FAT TO BREAST Bilateral 2017    Procedure: GRAFT FAT TO BREAST;  Bilateral Breast mastopexy and Fat Grafting from Abdomen;  Surgeon: SUZY Arora MD;  [facility]     HYSTERECTOMY, TOTAL, LAPAROSCOPIC, WITH SALPINGECTOMY, CYSTOSCOPY Bilateral 11/15/2024    Procedure: Total laparoscopic hysterectomy, bilateral salpingectomy, Cystoscopy, Lysis of Adhesions;  Surgeon: O'Shea, " "MD Reinaldo;  Location: [facility] OR     MASTECTOMY, NIPPLE SPARING Bilateral 6/15/2016    Procedure: MASTECTOMY, NIPPLE SPARING;  Surgeon: Nely Mcqueen MD;  [facility]     RECONSTRUCT BREAST BILATERAL Bilateral 6/15/2016    Procedure: RECONSTRUCT BREAST BILATERAL;  Surgeon: SUZY Arora MD;  [facility]     RECONSTRUCT BREAST SECOND STAGE BILATERAL N/A 9/15/2016    Procedure: RECONSTRUCT BREAST SECOND STAGE BILATERAL;  Surgeon: SUZY Arora MD;  [facility]     TONSILLECTOMY & ADENOIDECTOMY  2007     [facility]       Current Outpatient Medications   Medication Sig Dispense Refill     magnesium 250 MG tablet Take 1 tablet by mouth daily       Multiple Vitamin (MULTIVITAMINS PO)        No current facility-administered medications for this visit.      Allergies   Allergen Reactions     Oxycodone-Acetaminophen Nausea and Vomiting and Nausea     Vicodin [Hydrocodone-Acetaminophen] Nausea and Vomiting     Sensitivity to narcotics.     Tramadol Nausea and Vomiting and Nausea          OBJECTIVE:    PHYSICAL EXAM:  /76   Pulse 81   Temp 98.6  F (37  C) (Oral)   Resp 16   Ht 1.689 m (5' 6.5\")   Wt 85.7 kg (189 lb)   LMP 10/13/2024 (Exact Date)   SpO2 98%   BMI 30.05 kg/m       CONSTITUTIONAL: Alert non-toxic appearing female in no acute distress    GASTROINTESTINAL: Abdomen soft, non-distended, and non-tender to palpation without masses or organomegaly    GENITOURINARY: External genitalia and urethral meatus pink without lesions, masses, or excoriation. Vagina pink and rugated without masses or lesions. Cervix surgically absent. Vaginal cuff intact visually, to probing with cotton tipped applicator, and on palpation. No erythema or edema. There is a 1cm dark red friable polypoid lesion at the R aspect of the cuff- this was removed easily at the base with a ringed forceps and placed in formalin. Beneath this lesion appeared a scant amt of hypergranulation tissue which " "was oozing- with her consent, this was treated with silver nitrate and hemostasis was obtained.     PSYCHIATRIC: Interactive, affect euthymic, makes appropriate eye contact, thought process linear        ASSESSMENT/PLAN:    Post-coital bleeding: Suspect secondary to polypoid lesion at the vaginal cuff- this was removed with a ring forceps and hemostasis was obtained with silver nitrate. Tolerated well. Polypoid lesion was placed in formalin and sent to pathology. I will call her with results. Reviewed after cares. Will have her return in 2-3 weeks for recheck.  Reviewed alarm s/sxs and when to seek further care  Patient verbalized understanding of and agreement with plan      MESHA De La Rosa, CNP  Division of Gynecologic Oncology         Oncology Rooming Note    April 29, 2025 10:55 AM   Tessa Ospina is a 39 year old female who presents for:    Chief Complaint   Patient presents with     Oncology Clinic Visit     Initial Vitals: /76   Pulse 81   Temp 98.6  F (37  C) (Oral)   Resp 16   Ht 1.689 m (5' 6.5\")   Wt 85.7 kg (189 lb)   LMP 10/13/2024 (Exact Date)   SpO2 98%   BMI 30.05 kg/m   Estimated body mass index is 30.05 kg/m  as calculated from the following:    Height as of this encounter: 1.689 m (5' 6.5\").    Weight as of this encounter: 85.7 kg (189 lb). Body surface area is 2.01 meters squared.  No Pain (0) Comment: Data Unavailable   Patient's last menstrual period was 10/13/2024 (exact date).  Allergies reviewed: Yes  Medications reviewed: Yes    Medications: Medication refills not needed today.  Pharmacy name entered into [employer]: [facility] #[id number] - [place] [address] AT [facility] [address]    Frailty Screening:   Is the patient here for a new oncology consult visit in cancer care? 2. No    PHQ9:  Did this patient require a PHQ9?: Yes   If the patient required a PHQ9 assessment, did the results require a follow up with the Provider/Nurse?: " Eden Redmond MA              Again, thank you for allowing me to participate in the care of your patient.        Sincerely,        MESHA De La Rosa CNP    Electronically signed

## 2025-04-29 NOTE — PROGRESS NOTES
"Gynecologic Oncology Follow Up Note    RE: Tessa Ospina   : 1985  THEO: 2025  GYNECOLOGIC ONCOLOGIST: Reinaldo Monahan MD    CC:  post-coital bleeding    INTERVAL HISTORY:  Tessa is a 39 year old female with a BRCA2 mutation s/p prophylactic TLH/BS on 11/15/24 presenting for an acute visit regarding post-coital bleeding.    She has had spotting after intercourse about 10 times since her surgery- intercourse is no longer enjoyable as her tissue feels \"raw.\" Typically has spotting, but yesterday, had pelvic cramping and she noticed bright red blood in her liner which lasted for about three hours. Felt uncomfortable to have a bowel movement yesterday after that. Bleeding has stopped.    Denies fevers, urinary symptoms, persistent abdominal or pelvic pain, vaginal discharge or leakage of fluids, or persistent issues with her bowels.           Past Medical History:   Diagnosis Date    BRCA2 positive     has had bilateral mastectomies and reconstruction, still has ovaries    PONV (postoperative nausea and vomiting)       Past Surgical History:   Procedure Laterality Date    BREAST BIOPSY, RT/LT Right 2015    benign     SECTION  , 2013     x 2    COSMETIC SURGERY  2016, 2016    Prophylactic bilateral mastectomy and reconstruction    GRAFT FAT TO BREAST Bilateral 2017    Procedure: GRAFT FAT TO BREAST;  Bilateral Breast mastopexy and Fat Grafting from Abdomen;  Surgeon: SUZY Arora MD;  [facility]    HYSTERECTOMY, TOTAL, LAPAROSCOPIC, WITH SALPINGECTOMY, CYSTOSCOPY Bilateral 11/15/2024    Procedure: Total laparoscopic hysterectomy, bilateral salpingectomy, Cystoscopy, Lysis of Adhesions;  Surgeon: Reinaldo Monahan MD;  [facility]    MASTECTOMY, NIPPLE SPARING Bilateral 6/15/2016    Procedure: MASTECTOMY, NIPPLE SPARING;  Surgeon: Nely Mcqueen MD;  [facility]    RECONSTRUCT BREAST BILATERAL Bilateral 6/15/2016    Procedure: " "RECONSTRUCT BREAST BILATERAL;  Surgeon: SUZY Arora MD;  [facility]    RECONSTRUCT BREAST SECOND STAGE BILATERAL N/A 9/15/2016    Procedure: RECONSTRUCT BREAST SECOND STAGE BILATERAL;  Surgeon: SUZY Arora MD;  [facility]: [facility] OR    TONSILLECTOMY & ADENOIDECTOMY  2007    [facility]       Current Outpatient Medications   Medication Sig Dispense Refill    magnesium 250 MG tablet Take 1 tablet by mouth daily      Multiple Vitamin (MULTIVITAMINS PO)        No current facility-administered medications for this visit.      Allergies   Allergen Reactions    Oxycodone-Acetaminophen Nausea and Vomiting and Nausea    Vicodin [Hydrocodone-Acetaminophen] Nausea and Vomiting     Sensitivity to narcotics.    Tramadol Nausea and Vomiting and Nausea          OBJECTIVE:    PHYSICAL EXAM:  /76   Pulse 81   Temp 98.6  F (37  C) (Oral)   Resp 16   Ht 1.689 m (5' 6.5\")   Wt 85.7 kg (189 lb)   LMP 10/13/2024 (Exact Date)   SpO2 98%   BMI 30.05 kg/m       CONSTITUTIONAL: Alert non-toxic appearing female in no acute distress    GASTROINTESTINAL: Abdomen soft, non-distended, and non-tender to palpation without masses or organomegaly    GENITOURINARY: External genitalia and urethral meatus pink without lesions, masses, or excoriation. Vagina pink and rugated without masses or lesions. Cervix surgically absent. Vaginal cuff intact visually, to probing with cotton tipped applicator, and on palpation. No erythema or edema. There is a 1cm dark red friable polypoid lesion at the R aspect of the cuff- this was removed easily at the base with a ringed forceps and placed in formalin. Beneath this lesion appeared a scant amt of hypergranulation tissue which was oozing- with her consent, this was treated with silver nitrate and hemostasis was obtained.     PSYCHIATRIC: Interactive, affect euthymic, makes appropriate eye contact, thought process linear        ASSESSMENT/PLAN:    Post-coital bleeding: " Suspect secondary to polypoid lesion at the vaginal cuff- this was removed with a ring forceps and hemostasis was obtained with silver nitrate. Tolerated well. Polypoid lesion was placed in formalin and sent to pathology. I will call her with results. Reviewed after cares. Will have her return in 2-3 weeks for recheck.  Reviewed alarm s/sxs and when to seek further care  Patient verbalized understanding of and agreement with plan      MESHA De La Rosa, CNP  Division of Gynecologic Oncology

## 2025-05-01 ENCOUNTER — ANCILLARY PROCEDURE (OUTPATIENT)
Dept: ULTRASOUND IMAGING | Facility: CLINIC | Age: 40
End: 2025-05-01
Payer: COMMERCIAL

## 2025-05-01 ENCOUNTER — LAB (OUTPATIENT)
Dept: LAB | Facility: CLINIC | Age: 40
End: 2025-05-01
Payer: COMMERCIAL

## 2025-05-01 DIAGNOSIS — Z15.01 BRCA2 POSITIVE: ICD-10-CM

## 2025-05-01 DIAGNOSIS — Z12.73 SCREENING FOR OVARIAN CANCER: ICD-10-CM

## 2025-05-01 DIAGNOSIS — Z15.09 BRCA2 POSITIVE: ICD-10-CM

## 2025-05-01 LAB
CANCER AG125 SERPL-ACNC: 8 U/ML
PATH REPORT.COMMENTS IMP SPEC: NORMAL
PATH REPORT.COMMENTS IMP SPEC: NORMAL
PATH REPORT.FINAL DX SPEC: NORMAL
PATH REPORT.GROSS SPEC: NORMAL
PATH REPORT.MICROSCOPIC SPEC OTHER STN: NORMAL
PATH REPORT.RELEVANT HX SPEC: NORMAL
PHOTO IMAGE: NORMAL

## 2025-05-01 PROCEDURE — 76830 TRANSVAGINAL US NON-OB: CPT

## 2025-05-01 PROCEDURE — 76856 US EXAM PELVIC COMPLETE: CPT

## 2025-05-12 NOTE — PROGRESS NOTES
Gynecologic Oncology Follow Up Note    RE: Tessa Ospina   : 1985  THEO: May 13, 2025  GYNECOLOGIC ONCOLOGIST: Reinaldo Monahan MD    CC:  vaginal cuff check    INTERVAL HISTORY:  Tessa is a 39 year old female with a BRCA2 mutation s/p prophylactic TLH/BS on 11/15/24 presenting for a repeat vaginal cuff check. She was evaluated for post-coital pain and bleeding  and found to have a friable mass to the vaginal cuff, which was sent to path and demonstrated ulcerated, congested, and inflamed granulation tissue.    She is feeling well without concerns. No pelvic pain or vaginal bleeding but has not had intercourse again yet.    Past Medical History:   Diagnosis Date    BRCA2 positive     has had bilateral mastectomies and reconstruction, still has ovaries    PONV (postoperative nausea and vomiting)       Past Surgical History:   Procedure Laterality Date    BREAST BIOPSY, RT/LT Right 2015    benign     SECTION  , 2013     x 2    COSMETIC SURGERY  2016, 2016    Prophylactic bilateral mastectomy and reconstruction    GRAFT FAT TO BREAST Bilateral 2017    Procedure: GRAFT FAT TO BREAST;  Bilateral Breast mastopexy and Fat Grafting from Abdomen;  Surgeon: SUZY Arora MD;  [facility]    HYSTERECTOMY, TOTAL, LAPAROSCOPIC, WITH SALPINGECTOMY, CYSTOSCOPY Bilateral 11/15/2024    Procedure: Total laparoscopic hysterectomy, bilateral salpingectomy, Cystoscopy, Lysis of Adhesions;  Surgeon: Reinaldo Monahan MD;  [facility]    MASTECTOMY, NIPPLE SPARING Bilateral 6/15/2016    Procedure: MASTECTOMY, NIPPLE SPARING;  Surgeon: Nely Mcqueen MD;  [facility]    RECONSTRUCT BREAST BILATERAL Bilateral 6/15/2016    Procedure: RECONSTRUCT BREAST BILATERAL;  Surgeon: SUZY Arora MD;  [facility]    RECONSTRUCT BREAST SECOND STAGE BILATERAL N/A 9/15/2016    Procedure: RECONSTRUCT BREAST SECOND STAGE BILATERAL;  Surgeon: SUZY Arora  MD Hilda;  [facility]: [facility] OR    TONSILLECTOMY & ADENOIDECTOMY  2007    [facility]       Current Outpatient Medications   Medication Sig Dispense Refill    magnesium 250 MG tablet Take 1 tablet by mouth daily      Multiple Vitamin (MULTIVITAMINS PO)        No current facility-administered medications for this visit.      Allergies   Allergen Reactions    Oxycodone-Acetaminophen Nausea and Vomiting and Nausea    Vicodin [Hydrocodone-Acetaminophen] Nausea and Vomiting     Sensitivity to narcotics.    Tramadol Nausea and Vomiting and Nausea          OBJECTIVE:    PHYSICAL EXAM:  LMP 10/13/2024 (Exact Date)      CONSTITUTIONAL: Alert non-toxic appearing female in no acute distress    GASTROINTESTINAL: Abdomen soft, non-distended, and non-tender to palpation without masses or organomegaly    GENITOURINARY: External genitalia and urethral meatus pink without lesions, masses, or excoriation. Vagina pink and rugated without masses or lesions. Cervix surgically absent. Vaginal cuff intact visually, to probing with cotton tipped applicator, and on palpation. No erythema or edema.    PSYCHIATRIC: Interactive, affect euthymic, makes appropriate eye contact, thought process linear        ASSESSMENT/PLAN:    Post-operative granulation tissue: Resolved. Cuff intact, no evidence of dehiscence, absence, or persistent granulation tissue. May resume normal activities. To call with questions or concerns.      MESHA De La Rosa, CNP  Division of Gynecologic Oncology

## 2025-05-13 ENCOUNTER — ONCOLOGY VISIT (OUTPATIENT)
Dept: ONCOLOGY | Facility: CLINIC | Age: 40
End: 2025-05-13
Attending: NURSE PRACTITIONER
Payer: COMMERCIAL

## 2025-05-13 VITALS
SYSTOLIC BLOOD PRESSURE: 114 MMHG | DIASTOLIC BLOOD PRESSURE: 72 MMHG | WEIGHT: 189 LBS | HEART RATE: 77 BPM | RESPIRATION RATE: 16 BRPM | BODY MASS INDEX: 29.66 KG/M2 | OXYGEN SATURATION: 98 % | HEIGHT: 67 IN | TEMPERATURE: 98.4 F

## 2025-05-13 DIAGNOSIS — N89.8 GRANULATION TISSUE OF VAGINAL CUFF: ICD-10-CM

## 2025-05-13 DIAGNOSIS — Z90.710 S/P LAPAROSCOPIC HYSTERECTOMY: Primary | ICD-10-CM

## 2025-05-13 PROCEDURE — 99213 OFFICE O/P EST LOW 20 MIN: CPT | Performed by: NURSE PRACTITIONER

## 2025-05-13 PROCEDURE — 99024 POSTOP FOLLOW-UP VISIT: CPT | Performed by: NURSE PRACTITIONER

## 2025-05-13 ASSESSMENT — PAIN SCALES - GENERAL: PAINLEVEL_OUTOF10: NO PAIN (0)

## 2025-05-13 NOTE — LETTER
2025      Tessa Ospina  [address]  [place] [address]      Dear Colleague,    Thank you for referring your patient, Tessa Ospina, to the [facility]. Please see a copy of my visit note below.    Gynecologic Oncology Follow Up Note    RE: Tessa Ospina   : 1985  THEO: May 13, 2025  GYNECOLOGIC ONCOLOGIST: Reinaldo Monahan MD    CC:  vaginal cuff check    INTERVAL HISTORY:  Tessa is a 39 year old female with a BRCA2 mutation s/p prophylactic TLH/BS on 11/15/24 presenting for a repeat vaginal cuff check. She was evaluated for post-coital pain and bleeding  and found to have a friable mass to the vaginal cuff, which was sent to path and demonstrated ulcerated, congested, and inflamed granulation tissue.    She is feeling well without concerns. No pelvic pain or vaginal bleeding but has not had intercourse again yet.    Past Medical History:   Diagnosis Date     BRCA2 positive     has had bilateral mastectomies and reconstruction, still has ovaries     PONV (postoperative nausea and vomiting)       Past Surgical History:   Procedure Laterality Date     BREAST BIOPSY, RT/LT Right 2015    benign      SECTION  , 2013     x 2     COSMETIC SURGERY  2016, 2016    Prophylactic bilateral mastectomy and reconstruction     GRAFT FAT TO BREAST Bilateral 2017    Procedure: GRAFT FAT TO BREAST;  Bilateral Breast mastopexy and Fat Grafting from Abdomen;  Surgeon: SUZY Arora MD;  Location: [facility] OR     HYSTERECTOMY, TOTAL, LAPAROSCOPIC, WITH SALPINGECTOMY, CYSTOSCOPY Bilateral 11/15/2024    Procedure: Total laparoscopic hysterectomy, bilateral salpingectomy, Cystoscopy, Lysis of Adhesions;  Surgeon: Reinaldo Monahan MD;  Location: [facility] OR     MASTECTOMY, NIPPLE SPARING Bilateral 6/15/2016    Procedure: MASTECTOMY, NIPPLE SPARING;  Surgeon: Nely Mcqueen MD;  Location:  OR     RECONSTRUCT BREAST BILATERAL Bilateral 6/15/2016     Procedure: RECONSTRUCT BREAST BILATERAL;  Surgeon: SUZY Arora MD;  Location: [facility] OR     RECONSTRUCT BREAST SECOND STAGE BILATERAL N/A 9/15/2016    Procedure: RECONSTRUCT BREAST SECOND STAGE BILATERAL;  Surgeon: SUZY Arora MD;  Location: [facility] OR     TONSILLECTOMY & ADENOIDECTOMY  2007     [facility]       Current Outpatient Medications   Medication Sig Dispense Refill     magnesium 250 MG tablet Take 1 tablet by mouth daily       Multiple Vitamin (MULTIVITAMINS PO)        No current facility-administered medications for this visit.      Allergies   Allergen Reactions     Oxycodone-Acetaminophen Nausea and Vomiting and Nausea     Vicodin [Hydrocodone-Acetaminophen] Nausea and Vomiting     Sensitivity to narcotics.     Tramadol Nausea and Vomiting and Nausea          OBJECTIVE:    PHYSICAL EXAM:  LMP 10/13/2024 (Exact Date)      CONSTITUTIONAL: Alert non-toxic appearing female in no acute distress    GASTROINTESTINAL: Abdomen soft, non-distended, and non-tender to palpation without masses or organomegaly    GENITOURINARY: External genitalia and urethral meatus pink without lesions, masses, or excoriation. Vagina pink and rugated without masses or lesions. Cervix surgically absent. Vaginal cuff intact visually, to probing with cotton tipped applicator, and on palpation. No erythema or edema.    PSYCHIATRIC: Interactive, affect euthymic, makes appropriate eye contact, thought process linear        ASSESSMENT/PLAN:    Post-operative granulation tissue: Resolved. Cuff intact, no evidence of dehiscence, absence, or persistent granulation tissue. May resume normal activities. To call with questions or concerns.      MESHA De La Rosa, CNP  Division of Gynecologic Oncology         Oncology Rooming Note    May 13, 2025 8:07 AM   Tessa Ospina is a 39 year old female who presents for:    Chief Complaint   Patient presents with     Oncology Clinic Visit     Initial Vitals: /72   Pulse  "77   Temp 98.4  F (36.9  C) (Oral)   Resp 16   Ht 1.689 m (5' 6.5\")   Wt 85.7 kg (189 lb)   LMP 10/13/2024 (Exact Date)   SpO2 98%   BMI 30.05 kg/m   Estimated body mass index is 30.05 kg/m  as calculated from the following:    Height as of this encounter: 1.689 m (5' 6.5\").    Weight as of this encounter: 85.7 kg (189 lb). Body surface area is 2.01 meters squared.  No Pain (0) Comment: Data Unavailable   Patient's last menstrual period was 10/13/2024 (exact date).  Allergies reviewed: Yes  Medications reviewed: Yes    Medications: Medication refills not needed today.  [facility] into [id number] [place] [id number] AT [facility] [address]    Frailty Screening:   Is the patient here for a new oncology consult visit in cancer care? 2. No    PHQ9:  Did this patient require a PHQ9?: No        Sadia Redmond MA              Again, thank you for allowing me to participate in the care of your patient.        Sincerely,        MESHA De La Rosa CNP    Electronically signed"

## 2025-05-13 NOTE — PROGRESS NOTES
"Oncology Rooming Note    May 13, 2025 8:07 AM   Tessa Ospina is a 39 year old female who presents for:    Chief Complaint   Patient presents with    Oncology Clinic Visit     Initial Vitals: /72   Pulse 77   Temp 98.4  F (36.9  C) (Oral)   Resp 16   Ht 1.689 m (5' 6.5\")   Wt 85.7 kg (189 lb)   LMP 10/13/2024 (Exact Date)   SpO2 98%   BMI 30.05 kg/m   Estimated body mass index is 30.05 kg/m  as calculated from the following:    Height as of this encounter: 1.689 m (5' 6.5\").    Weight as of this encounter: 85.7 kg (189 lb). Body surface area is 2.01 meters squared.  No Pain (0) Comment: Data Unavailable   Patient's last menstrual period was 10/13/2024 (exact date).  Allergies reviewed: Yes  Medications reviewed: Yes    Medications: Medication refills not needed today.  Pharm[facility] into [employer]: [id number] - [place] [id number] AT [facility] [address]  [address]    Frailty Screening:   Is the patient here for a new oncology consult visit in cancer care? 2. No    PHQ9:  Did this patient require a PHQ9?: No        Sadia Redmond MA            "

## 2025-06-26 ENCOUNTER — TELEPHONE (OUTPATIENT)
Dept: DERMATOLOGY | Facility: CLINIC | Age: 40
End: 2025-06-26
Payer: COMMERCIAL

## 2025-06-26 NOTE — TELEPHONE ENCOUNTER
Patient Details  Patient's Full Name  Tessa Trejo  Patient's Date of Birth  1985  Patient's Phone Number  [phone]  Patient's Email ID  [email or web address]  Has the patient visited [employer] in the past 3 years?  Yes  Address Details  [place]  [address] [place]  [address]  Appointment Preferences  Reason for appointment  Full body melanoma screening recommended during Oncology Risk Management Consultation with Yolie Meeks.  Preferred Visit Type  In-person   Services   Do you need an  for your appointment?  No  Billing Preference  Which billing situation applies to the patient?  Patient has insurance  Insurance Information  Insurance Provider Name  [employer]  Member ID/ Policy Number  [id number]  Group Number  [id number]  Insurance Contact for Provider  [phone]  Policy Addison  Is the patient the Insurance Policy addison/subscriber?  No, someone else is the policy addison  Policy Addison Details  Relationship with Policy Addison  Spouse  Policy Addison Full Name  BRYAN TREJO  Date of Birth  1985  Callback Preferences  Could you share your preferred callback time with us?  No, I don't have a preference

## (undated) DEVICE — NDL BLUNT 18GA 1.5" W/O FILTER 305180

## (undated) DEVICE — ESU GROUND PAD ADULT W/CORD E7507

## (undated) DEVICE — LINEN TOWEL PACK X30 5481

## (undated) DEVICE — ANTIFOG SOLUTION W/FOAM PAD CF-1002

## (undated) DEVICE — KIT PATIENT POSITIONING PIGAZZI LATEX FREE 40580

## (undated) DEVICE — DRAPE SHEET REV FOLD 3/4 9349

## (undated) DEVICE — SUCTION MANIFOLD NEPTUNE 2 SYS 4 PORT 0702-020-000

## (undated) DEVICE — SPECIMEN TRAP MUCOUS 40ML LUKI C30200A

## (undated) DEVICE — BLADE KNIFE SURG 10 371110

## (undated) DEVICE — GLOVE ESTEEM POWDER FREE SMT 7.0  2D72PT70

## (undated) DEVICE — LINEN TOWEL PACK X6 WHITE 5487

## (undated) DEVICE — TUBING IRRIG CYSTO/BLADDER SET 81" LF 2C4040

## (undated) DEVICE — WIPES FOLEY CARE SURESTEP PROVON DFC100

## (undated) DEVICE — PROTECTOR ARM ONE-STEP TRENDELENBURG 40418 (COI)

## (undated) DEVICE — DRSG PRIMAPORE 02X3" 7133

## (undated) DEVICE — SOL RINGERS LACTATED 1000ML BAG 07953-09

## (undated) DEVICE — BLADE CLIPPER DISP 4406

## (undated) DEVICE — SU DERMABOND PRINEO 22CM CLR222US

## (undated) DEVICE — DRAPE U SPLIT 74X120" 29440

## (undated) DEVICE — DRSG ABDOMINAL 07 1/2X8" 7197D

## (undated) DEVICE — BNDG ABDOMINAL BINDER 15X46-62"  08140562

## (undated) DEVICE — NDL INSUFFLATION 13GA 120MM C2201

## (undated) DEVICE — SU VICRYL+ 0 54 UNDYED VCP608H

## (undated) DEVICE — DEVICE SUTURE PASSER 14GA WECK EFX EFXSP2

## (undated) DEVICE — SYR 50ML CATH TIP W/O NDL 309620

## (undated) DEVICE — SYR 10ML LL W/O NDL 302995

## (undated) DEVICE — GLOVE BIOGEL PI MICRO SZ 6.5 48565

## (undated) DEVICE — BLADE KNIFE SURG 11 371111

## (undated) DEVICE — DRAPE SHEET MED 44X70" 9355

## (undated) DEVICE — SYR 10ML LL W/O NDL

## (undated) DEVICE — TUBING SUCTION 12"X1/4" N612

## (undated) DEVICE — TIP CAUTERY L HOOK 36CM E377336C

## (undated) DEVICE — LINEN TOWEL PACK X5 5464

## (undated) DEVICE — SOL NACL 0.9% IRRIG 1000ML BOTTLE 2F7124

## (undated) DEVICE — COLLECTION DEVICE SYSTEM TISSUE REVOLVE RV0001 4 PACK RV0004

## (undated) DEVICE — ENDO TROCAR SLEEVE KII Z-THREADED 05X100MM CTS02

## (undated) DEVICE — DRAPE MAYO STAND 23X54 8337

## (undated) DEVICE — TUBING ASPIRATING BYRON 3/8"X12' PT-5558

## (undated) DEVICE — SYR 30ML LL W/O NDL

## (undated) DEVICE — SU ETHILON 3-0 FS-1 18" 663G

## (undated) DEVICE — JELLY LUBRICATING SURGILUBE 2OZ TUBE

## (undated) DEVICE — BLADE KNIFE SURG 15 371115

## (undated) DEVICE — SUCTION IRR STRYKERFLOW II W/TIP 250-070-520

## (undated) DEVICE — ADH LIQUID MASTISOL TOPICAL VIAL 2-3ML 0523-48

## (undated) DEVICE — SOL WATER IRRIG 1000ML BOTTLE 2F7114

## (undated) DEVICE — CATH TRAY FOLEY SURESTEP 16FR W/URNE MTR STLK LATEX A303316A

## (undated) DEVICE — SU DERMABOND ADVANCED .7ML DNX12

## (undated) DEVICE — SU PLAIN FAST ABSORB 5-0 PC-1 18" 1915G

## (undated) DEVICE — PACK MINOR CUSTOM ASC

## (undated) DEVICE — SPONGE LAP 4X18" X8415

## (undated) DEVICE — PREP CHLORAPREP 26ML TINTED ORANGE  260815

## (undated) DEVICE — DRSG KERLIX 4 1/2"X4YDS ROLL 6730

## (undated) DEVICE — SYR 50ML LL W/O NDL 309653

## (undated) DEVICE — Device

## (undated) DEVICE — ESU LIGASURE LAPAROSCOPIC BLUNT TIP SEALER 5MMX37CM LF1837

## (undated) DEVICE — APPLICATORS COTTON TIP 6"X2 STERILE LF C15053-006

## (undated) DEVICE — ENDO APPLICATOR SURGIFLO PLASMA COBLATION MS1995

## (undated) DEVICE — SU WND CLOSURE VLOC 180 ABS 0 9" GS-21 VLOCL0346

## (undated) DEVICE — ENDO TROCAR FIRST ENTRY KII FIOS Z-THRD 05X100MM CTF03

## (undated) DEVICE — PREP CHLORAPREP 26ML TINTED HI-LITE ORANGE 930815

## (undated) DEVICE — SU MONOCRYL 4-0 PS-2 27" UND Y426H

## (undated) DEVICE — PREP DYNA-HEX 4% CHG SCRUB 4OZ BOTTLE MDS098710

## (undated) DEVICE — RETR ELEV / UTERINE MANIPULATOR V-CARE MED CUP 60-6085-201A

## (undated) DEVICE — SOL NACL 0.9% INJ 1000ML BAG 2B1324X

## (undated) DEVICE — GLOVE BIOGEL PI MICRO INDICATOR UNDERGLOVE SZ 7.0 48970

## (undated) DEVICE — RX SURGIFLO HEMOSTATIC MATRIX W/THROMBIN 8ML 2994

## (undated) DEVICE — DRAPE IOBAN INCISE 23X17" 6650EZ

## (undated) DEVICE — SU VICRYL 3-0 SH 27" UND J416H

## (undated) RX ORDER — HEPARIN SODIUM 5000 [USP'U]/.5ML
INJECTION, SOLUTION INTRAVENOUS; SUBCUTANEOUS
Status: DISPENSED
Start: 2024-11-15

## (undated) RX ORDER — FENTANYL CITRATE 50 UG/ML
INJECTION, SOLUTION INTRAMUSCULAR; INTRAVENOUS
Status: DISPENSED
Start: 2024-11-15

## (undated) RX ORDER — FENTANYL CITRATE 50 UG/ML
INJECTION, SOLUTION INTRAMUSCULAR; INTRAVENOUS
Status: DISPENSED
Start: 2017-11-02

## (undated) RX ORDER — PROPOFOL 10 MG/ML
INJECTION, EMULSION INTRAVENOUS
Status: DISPENSED
Start: 2017-11-02

## (undated) RX ORDER — ACETAMINOPHEN 325 MG/1
TABLET ORAL
Status: DISPENSED
Start: 2024-11-15

## (undated) RX ORDER — EPINEPHRINE 1 MG/ML
INJECTION, SOLUTION, CONCENTRATE INTRAVENOUS
Status: DISPENSED
Start: 2017-11-02

## (undated) RX ORDER — LIDOCAINE HYDROCHLORIDE 10 MG/ML
INJECTION, SOLUTION EPIDURAL; INFILTRATION; INTRACAUDAL; PERINEURAL
Status: DISPENSED
Start: 2017-11-02

## (undated) RX ORDER — DEXAMETHASONE SODIUM PHOSPHATE 4 MG/ML
INJECTION, SOLUTION INTRA-ARTICULAR; INTRALESIONAL; INTRAMUSCULAR; INTRAVENOUS; SOFT TISSUE
Status: DISPENSED
Start: 2017-11-02

## (undated) RX ORDER — PHENYLEPHRINE HCL IN 0.9% NACL 1 MG/10 ML
SYRINGE (ML) INTRAVENOUS
Status: DISPENSED
Start: 2017-11-02

## (undated) RX ORDER — DEXAMETHASONE SODIUM PHOSPHATE 4 MG/ML
INJECTION, SOLUTION INTRA-ARTICULAR; INTRALESIONAL; INTRAMUSCULAR; INTRAVENOUS; SOFT TISSUE
Status: DISPENSED
Start: 2024-11-15

## (undated) RX ORDER — GABAPENTIN 300 MG/1
CAPSULE ORAL
Status: DISPENSED
Start: 2017-11-02

## (undated) RX ORDER — GLYCOPYRROLATE 0.2 MG/ML
INJECTION, SOLUTION INTRAMUSCULAR; INTRAVENOUS
Status: DISPENSED
Start: 2024-11-15

## (undated) RX ORDER — ONDANSETRON 2 MG/ML
INJECTION INTRAMUSCULAR; INTRAVENOUS
Status: DISPENSED
Start: 2017-11-02

## (undated) RX ORDER — LIDOCAINE HYDROCHLORIDE 20 MG/ML
INJECTION, SOLUTION EPIDURAL; INFILTRATION; INTRACAUDAL; PERINEURAL
Status: DISPENSED
Start: 2017-11-02

## (undated) RX ORDER — PROPOFOL 10 MG/ML
INJECTION, EMULSION INTRAVENOUS
Status: DISPENSED
Start: 2024-11-15

## (undated) RX ORDER — KETOROLAC TROMETHAMINE 30 MG/ML
INJECTION, SOLUTION INTRAMUSCULAR; INTRAVENOUS
Status: DISPENSED
Start: 2017-11-02

## (undated) RX ORDER — SCOLOPAMINE TRANSDERMAL SYSTEM 1 MG/1
PATCH, EXTENDED RELEASE TRANSDERMAL
Status: DISPENSED
Start: 2017-11-02

## (undated) RX ORDER — PHENAZOPYRIDINE HYDROCHLORIDE 100 MG/1
TABLET, FILM COATED ORAL
Status: DISPENSED
Start: 2024-11-15

## (undated) RX ORDER — CEFAZOLIN SODIUM/WATER 2 G/20 ML
SYRINGE (ML) INTRAVENOUS
Status: DISPENSED
Start: 2024-11-15

## (undated) RX ORDER — FENTANYL CITRATE-0.9 % NACL/PF 10 MCG/ML
PLASTIC BAG, INJECTION (ML) INTRAVENOUS
Status: DISPENSED
Start: 2024-11-15

## (undated) RX ORDER — METRONIDAZOLE 500 MG/100ML
INJECTION, SOLUTION INTRAVENOUS
Status: DISPENSED
Start: 2024-11-15

## (undated) RX ORDER — ACETAMINOPHEN 325 MG/1
TABLET ORAL
Status: DISPENSED
Start: 2017-11-02

## (undated) RX ORDER — ONDANSETRON 2 MG/ML
INJECTION INTRAMUSCULAR; INTRAVENOUS
Status: DISPENSED
Start: 2024-11-15

## (undated) RX ORDER — IBUPROFEN 200 MG
TABLET ORAL
Status: DISPENSED
Start: 2024-11-15

## (undated) RX ORDER — TOLTERODINE 4 MG/1
CAPSULE, EXTENDED RELEASE ORAL
Status: DISPENSED
Start: 2024-11-15